# Patient Record
Sex: FEMALE | Race: BLACK OR AFRICAN AMERICAN | NOT HISPANIC OR LATINO | Employment: OTHER | URBAN - METROPOLITAN AREA
[De-identification: names, ages, dates, MRNs, and addresses within clinical notes are randomized per-mention and may not be internally consistent; named-entity substitution may affect disease eponyms.]

---

## 2017-01-11 ENCOUNTER — HOSPITAL ENCOUNTER (OUTPATIENT)
Dept: RADIOLOGY | Facility: HOSPITAL | Age: 82
Discharge: HOME | End: 2017-01-11
Attending: UROLOGY
Payer: COMMERCIAL

## 2017-01-11 DIAGNOSIS — N28.1 RENAL CYST: ICD-10-CM

## 2017-01-11 PROCEDURE — 76770 US EXAM ABDO BACK WALL COMP: CPT | Mod: TC,PO

## 2017-01-11 PROCEDURE — 76770 US EXAM ABDO BACK WALL COMP: CPT | Mod: 26,,, | Performed by: RADIOLOGY

## 2017-01-25 ENCOUNTER — OFFICE VISIT (OUTPATIENT)
Dept: UROLOGY | Facility: CLINIC | Age: 82
End: 2017-01-25
Payer: COMMERCIAL

## 2017-01-25 VITALS — SYSTOLIC BLOOD PRESSURE: 146 MMHG | DIASTOLIC BLOOD PRESSURE: 76 MMHG | BODY MASS INDEX: 21.11 KG/M2 | WEIGHT: 123 LBS

## 2017-01-25 DIAGNOSIS — N28.1 RENAL CYST: Primary | ICD-10-CM

## 2017-01-25 LAB
BILIRUB SERPL-MCNC: NORMAL MG/DL
BLOOD URINE, POC: NORMAL
COLOR, POC UA: YELLOW
GLUCOSE UR QL STRIP: NORMAL
KETONES UR QL STRIP: NORMAL
LEUKOCYTE ESTERASE URINE, POC: NORMAL
NITRITE, POC UA: NORMAL
PH, POC UA: 6
PROTEIN, POC: NORMAL
SPECIFIC GRAVITY, POC UA: 1
UROBILINOGEN, POC UA: NORMAL

## 2017-01-25 PROCEDURE — 3077F SYST BP >= 140 MM HG: CPT | Mod: S$GLB,,, | Performed by: UROLOGY

## 2017-01-25 PROCEDURE — 1159F MED LIST DOCD IN RCRD: CPT | Mod: S$GLB,,, | Performed by: UROLOGY

## 2017-01-25 PROCEDURE — 81002 URINALYSIS NONAUTO W/O SCOPE: CPT | Mod: S$GLB,,, | Performed by: UROLOGY

## 2017-01-25 PROCEDURE — 99999 PR PBB SHADOW E&M-EST. PATIENT-LVL II: CPT | Mod: PBBFAC,,, | Performed by: UROLOGY

## 2017-01-25 PROCEDURE — 3078F DIAST BP <80 MM HG: CPT | Mod: S$GLB,,, | Performed by: UROLOGY

## 2017-01-25 PROCEDURE — 1157F ADVNC CARE PLAN IN RCRD: CPT | Mod: S$GLB,,, | Performed by: UROLOGY

## 2017-01-25 PROCEDURE — 1126F AMNT PAIN NOTED NONE PRSNT: CPT | Mod: S$GLB,,, | Performed by: UROLOGY

## 2017-01-25 PROCEDURE — 1160F RVW MEDS BY RX/DR IN RCRD: CPT | Mod: S$GLB,,, | Performed by: UROLOGY

## 2017-01-25 PROCEDURE — 99214 OFFICE O/P EST MOD 30 MIN: CPT | Mod: 25,S$GLB,, | Performed by: UROLOGY

## 2017-01-25 RX ORDER — CLONIDINE HYDROCHLORIDE 0.3 MG/1
TABLET ORAL
Qty: 60 TABLET | Refills: 3 | Status: SHIPPED | OUTPATIENT
Start: 2017-01-25 | End: 2017-05-22 | Stop reason: SDUPTHER

## 2017-01-25 NOTE — PROGRESS NOTES
Chief Complaint: Bilateral renal cysts    HPI:   1/25/17: US reassuring no sig changes   1/11/16: US reassuring no sig changes.   12/29/14: US reassuring renal findings are benign cysts most likely.  CT was done for abd pain and also shows no real changes in the kidneys.  6/23/14: 81 yo woman recently evaluated for renal disease and she had a renal u/s as part of her workup. On US there is a small thinly septated cyst in the midportion of the right kidney measuring 1.7 x 1.5 x 1.4 cm and a similar septated cyst in the upper pole of the left kidney measuring 2.1 x 1.7 x 1.7 cm. No hematuria, urolithiasis, urinary bother or other significant  complaint and the cysts are incidental findings.  Occasional urgency but she is used to it.  Has fevers at night and says it was 100.3 a couple nights ago and 99.6 last night with associated sweating.  Has had fever of unknown origin with prolonged hospitalization 30 yrs ago.  Multiple UA show 1-3 RBC per HPF but never more.    Allergies:  Allopurinol analogues; Diflucan [fluconazole]; Ace inhibitors; Amlodipine; Metronidazole; and Phenytoin sodium extended    Medications: has a current medication list which includes the following prescription(s): albuterol, ammonium lactate, aspirin, vitamin b complex, calcium carbonate/vitamin d3, clonidine, diltiazem, fluticasone, fluticasone-vilanterol, hydralazine, ketoconazole, sodium bicarbonate, and urea.    Review of Systems:  General: No fever, chills, fatigability, or weight loss.  Skin: No rashes, itching, or changes in color or texture of skin.  Chest: Denies MENJIVAR, cyanosis, wheezing, cough, and sputum production.  Abdomen: Appetite fine. No weight loss. Denies diarrhea, abdominal pain, hematemesis, or blood in stool.  Musculoskeletal: No joint stiffness or swelling. Denies back pain.  : As above.  All other review of systems negative.    PMH:   has a past medical history of Anemia; Anemia of chronic renal failure (5/12/2014);  Asthma, chronic; Cataract; GERD (gastroesophageal reflux disease); Gout, arthritis; Helicobacter pylori gastritis; Hypertension; Osteoarthritis; Pulmonary HTN; and Renal insufficiency.    PSH:   has a past surgical history that includes Thyroid surgery; endometrial polyp; Cholecystectomy; and Fetal blood transfusion (12/11/2015).    FamHx: family history includes Breast cancer in her sister; Cancer in her mother and sister; Glaucoma in her sister; Liver cancer in her mother and sister; Thyroid disease in her sister. There is no history of Melanoma, Eczema, Lupus, or Psoriasis.    SocHx:  reports that she has never smoked. She has never used smokeless tobacco. She reports that she does not drink alcohol or use illicit drugs.     Physical Exam:  Vitals:   Vitals:    01/25/17 1058   BP: (!) 146/76     General: A&Ox3. No apparent distress. No deformities.  Neck: No masses. Normal thyroid.  Lungs: normal inspiration. No use of accessory muscles.  Heart: normal pulse. No arrhythmias.  Abdomen: Soft. NT. ND  Skin: The skin is warm and dry. No jaundice.  Ext: No c/c/e.  : deferred    Impression/Plan:   1. No sig changes in renal findings likely Bosniak 2 cysts.  US after 2.5 years shows no change.  Will RTC prn no further imaging.

## 2017-01-25 NOTE — MR AVS SNAPSHOT
[facility]  [address]  [place] [address]  Phone: [phone]  Fax: [phone]                  Gloria Sanz   2017 11:00 AM   Office Visit    Description:  Female : 1931   Provider:  Austin Mon IV, MD   Department:  [facility]           Reason for Visit     Other           Diagnoses this Visit        Comments    Renal cyst    -  Primary            To Do List           Future Appointments        Provider Department Dept Phone    2017 11:05 AM LABORATORY, [facility] [phone]    2017 11:20 AM Marcus Clark MD [facility] [phone]    2017 11:45 AM CHAIR 05 [facility] [phone]    3/15/2017 9:10 AM [facility] CT1 LIMIT 500 LBS [facility] [phone]    3/15/2017 9:40 AM Ivon Feliz NP [facility] [phone]      Goals (5 Years of Data)     None      Follow-Up and Disposition     Return if symptoms worsen or fail to improve.    Follow-up and Disposition History      [facility]     [facility] Care [facility] -  Assistance  Registered nurses in the [facility] provide clinical advisement, health education, appointment booking, and other advisory services.  Call for this free service at [phone].             Medications           Message regarding Medications     Verify the changes and/or additions to your medication regime listed below are the same as discussed with your clinician today.  If any of these changes or additions are incorrect, please notify your healthcare provider.             Verify that the below list of medications is an accurate representation of the medications you are currently taking.  If none reported, the list may be blank. If incorrect, please contact your healthcare provider. Carry this list with you in case of emergency.           Current Medications     albuterol 90  mcg/actuation inhaler Inhale 2 puffs into the lungs every 6 (six) hours as needed for Wheezing.    ammonium lactate (AMLACTIN) 12 % lotion Use daily.  Apply to damp skin after bathing.    aspirin (ECOTRIN) 81 MG EC tablet Take 81 mg by mouth once daily.    B complex vitamins (B COMPLEX) TbSR Take by mouth. 1 Tablet Extended Release Oral Every day    CALCIUM CARBONATE/VITAMIN D3 (CALCIUM 500 + D, D3, ORAL) Take by mouth. 1 Tablet Oral Twice a day    cloNIDine (CATAPRES) 0.3 MG tablet take 1 tablet by mouth twice a day    diltiazem (CARDIZEM CD) 180 MG 24 hr capsule Take 1 capsule (180 mg total) by mouth once daily.    fluticasone (FLONASE) 50 mcg/actuation nasal spray 2 sprays by Each Nare route daily as needed.    fluticasone-vilanterol (BREO) 100-25 mcg/dose diskus inhaler Inhale 1 puff into the lungs once daily.    hydrALAZINE (APRESOLINE) 50 MG tablet Take 1 tablet (50 mg total) by mouth 3 (three) times daily.    ketoconazole (NIZORAL) 2 % shampoo Wash hair with medicated shampoo at least 2x/week - let sit on scalp at least 5 minutes prior to rinsing    sodium bicarbonate 650 MG tablet Take 1 tablet (650 mg total) by mouth 3 (three) times daily.    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 3 nights/week           Clinical Reference Information           Vital Signs - Last Recorded  Most recent update: 1/25/2017 10:59 AM by Kerry Reagan LPN    BP Wt BMI          (!) 146/76 (BP Location: Left arm, Patient Position: Sitting, BP Method: Manual) 55.8 kg (123 lb) 21.11 kg/m2        Blood Pressure          Most Recent Value    BP  (!)  146/76      Allergies as of 1/25/2017     Allopurinol Analogues    Diflucan [Fluconazole]    Ace Inhibitors    Amlodipine    Metronidazole    Phenytoin Sodium Extended      Immunizations Administered on Date of Encounter - 1/25/2017     None      Orders Placed During Today's Visit      Normal Orders This Visit    POCT urine dipstick without microscope

## 2017-02-06 ENCOUNTER — INFUSION (OUTPATIENT)
Dept: INFUSION THERAPY | Facility: HOSPITAL | Age: 82
End: 2017-02-06
Attending: INTERNAL MEDICINE
Payer: COMMERCIAL

## 2017-02-06 ENCOUNTER — OFFICE VISIT (OUTPATIENT)
Dept: HEMATOLOGY/ONCOLOGY | Facility: CLINIC | Age: 82
End: 2017-02-06
Payer: COMMERCIAL

## 2017-02-06 ENCOUNTER — LAB VISIT (OUTPATIENT)
Dept: LAB | Facility: HOSPITAL | Age: 82
End: 2017-02-06
Attending: INTERNAL MEDICINE
Payer: COMMERCIAL

## 2017-02-06 VITALS
DIASTOLIC BLOOD PRESSURE: 68 MMHG | HEIGHT: 63 IN | BODY MASS INDEX: 22.54 KG/M2 | HEART RATE: 51 BPM | TEMPERATURE: 98 F | WEIGHT: 127.19 LBS | SYSTOLIC BLOOD PRESSURE: 132 MMHG | RESPIRATION RATE: 18 BRPM | OXYGEN SATURATION: 98 %

## 2017-02-06 DIAGNOSIS — D63.1 ANEMIA OF CHRONIC RENAL FAILURE, STAGE 3 (MODERATE): ICD-10-CM

## 2017-02-06 DIAGNOSIS — N18.30 ANEMIA OF CHRONIC RENAL FAILURE, STAGE 3 (MODERATE): Primary | ICD-10-CM

## 2017-02-06 DIAGNOSIS — N18.4 ANEMIA OF CHRONIC RENAL FAILURE, STAGE 4 (SEVERE): ICD-10-CM

## 2017-02-06 DIAGNOSIS — D63.1 ANEMIA OF CHRONIC RENAL FAILURE, STAGE 3 (MODERATE): Primary | ICD-10-CM

## 2017-02-06 DIAGNOSIS — N18.4 CKD (CHRONIC KIDNEY DISEASE) STAGE 4, GFR 15-29 ML/MIN: Primary | ICD-10-CM

## 2017-02-06 DIAGNOSIS — D63.1 ANEMIA OF CHRONIC RENAL FAILURE, STAGE 4 (SEVERE): ICD-10-CM

## 2017-02-06 DIAGNOSIS — R13.10 DYSPHAGIA, UNSPECIFIED TYPE: ICD-10-CM

## 2017-02-06 DIAGNOSIS — D56.3 ALPHA THALASSEMIA SILENT CARRIER: ICD-10-CM

## 2017-02-06 DIAGNOSIS — N18.4 CKD (CHRONIC KIDNEY DISEASE) STAGE 4, GFR 15-29 ML/MIN: ICD-10-CM

## 2017-02-06 DIAGNOSIS — N18.30 ANEMIA OF CHRONIC RENAL FAILURE, STAGE 3 (MODERATE): ICD-10-CM

## 2017-02-06 LAB
BASOPHILS # BLD AUTO: 0.01 K/UL
BASOPHILS NFR BLD: 0.2 %
DIFFERENTIAL METHOD: ABNORMAL
EOSINOPHIL # BLD AUTO: 0.1 K/UL
EOSINOPHIL NFR BLD: 1.2 %
ERYTHROCYTE [DISTWIDTH] IN BLOOD BY AUTOMATED COUNT: 15.3 %
HCT VFR BLD AUTO: 25.9 %
HGB BLD-MCNC: 8.8 G/DL
LYMPHOCYTES # BLD AUTO: 1.7 K/UL
LYMPHOCYTES NFR BLD: 33.4 %
MCH RBC QN AUTO: 26.9 PG
MCHC RBC AUTO-ENTMCNC: 34 %
MCV RBC AUTO: 79 FL
MONOCYTES # BLD AUTO: 0.5 K/UL
MONOCYTES NFR BLD: 10.4 %
NEUTROPHILS # BLD AUTO: 2.7 K/UL
NEUTROPHILS NFR BLD: 54.8 %
PLATELET # BLD AUTO: 161 K/UL
PMV BLD AUTO: 9.8 FL
RBC # BLD AUTO: 3.27 M/UL
WBC # BLD AUTO: 5 K/UL

## 2017-02-06 PROCEDURE — 85025 COMPLETE CBC W/AUTO DIFF WBC: CPT | Mod: PO

## 2017-02-06 PROCEDURE — 63600175 PHARM REV CODE 636 W HCPCS: Mod: PO | Performed by: INTERNAL MEDICINE

## 2017-02-06 PROCEDURE — 99999 PR PBB SHADOW E&M-EST. PATIENT-LVL IV: CPT | Mod: PBBFAC,,, | Performed by: INTERNAL MEDICINE

## 2017-02-06 PROCEDURE — 36415 COLL VENOUS BLD VENIPUNCTURE: CPT | Mod: PO

## 2017-02-06 PROCEDURE — 1159F MED LIST DOCD IN RCRD: CPT | Mod: S$GLB,,, | Performed by: INTERNAL MEDICINE

## 2017-02-06 PROCEDURE — 96372 THER/PROPH/DIAG INJ SC/IM: CPT | Mod: PO

## 2017-02-06 PROCEDURE — 1160F RVW MEDS BY RX/DR IN RCRD: CPT | Mod: S$GLB,,, | Performed by: INTERNAL MEDICINE

## 2017-02-06 PROCEDURE — 3075F SYST BP GE 130 - 139MM HG: CPT | Mod: S$GLB,,, | Performed by: INTERNAL MEDICINE

## 2017-02-06 PROCEDURE — 1126F AMNT PAIN NOTED NONE PRSNT: CPT | Mod: S$GLB,,, | Performed by: INTERNAL MEDICINE

## 2017-02-06 PROCEDURE — 99214 OFFICE O/P EST MOD 30 MIN: CPT | Mod: 25,S$GLB,, | Performed by: INTERNAL MEDICINE

## 2017-02-06 PROCEDURE — 3078F DIAST BP <80 MM HG: CPT | Mod: S$GLB,,, | Performed by: INTERNAL MEDICINE

## 2017-02-06 PROCEDURE — 1157F ADVNC CARE PLAN IN RCRD: CPT | Mod: S$GLB,,, | Performed by: INTERNAL MEDICINE

## 2017-02-06 RX ADMIN — ERYTHROPOIETIN 20000 UNITS: 20000 INJECTION, SOLUTION INTRAVENOUS; SUBCUTANEOUS at 12:02

## 2017-02-06 NOTE — MR AVS SNAPSHOT
Patient Information     Patient Name Sex Gloria Bruce Female 1931      Visit Information        Provider Department Dept Phone Center    2017 11:45 AM [facility] Chemo Infusion [facility]otherapy [facility]fusion [phone] [facility]      Patient Instructions    .  [facility]  [address] [facility] [address] [address]  [address]  [phone] phone     [phone] fax  Hours of Operation: Monday- Friday 8:00am- 5:00pm  After hours phone  [phone]  Hematology / Oncology Physicians on call      Dr. Fernando Cristina    Please call with any concerns regarding your appointment today.         Your Current Medications Are     albuterol 90 mcg/actuation inhaler    ammonium lactate (AMLACTIN) 12 % lotion    aspirin (ECOTRIN) 81 MG EC tablet    B complex vitamins (B COMPLEX) TbSR    CALCIUM CARBONATE/VITAMIN D3 (CALCIUM 500 + D, D3, ORAL)    cloNIDine (CATAPRES) 0.3 MG tablet    diltiazem (CARDIZEM CD) 180 MG 24 hr capsule    fluticasone (FLONASE) 50 mcg/actuation nasal spray    fluticasone-vilanterol (BREO) 100-25 mcg/dose diskus inhaler    hydrALAZINE (APRESOLINE) 50 MG tablet    ketoconazole (NIZORAL) 2 % shampoo    sodium bicarbonate 650 MG tablet    urea (CARMOL) 40 % Crea      Facility-Administered Medications     epoetin traci injection 20,000 Units      Appointments for Next Year     2017 11:00 AM CONSULT (30 min.) [facility] Vladimir Antoine III, MD    Arrive at check-in approximately 15 minutes before your scheduled appointment time. Bring all outside medical records and imaging, along with a list of your current medications and insurance card.    (off [facility]) 2nd floor    2017 10:00 AM INFUSION 015 MIN (15 min.) [facility] CHAIR [address] [facility]    Arrive at check-in approximately 15 minutes before your scheduled appointment time. Bring all outside medical records and imaging, along with a list of your current  "medications and insurance card.    3/6/2017 10:55 AM NON FASTING LAB (5 min.) [facility] LABORATORY, [facility]    Arrive at check-in approximately 15 minutes before your scheduled appointment time. Bring all outside medical records and imaging, along with a list of your current medications and insurance card.    (off [address]) 2nd floor    3/6/2017 11:20 AM ESTABLISHED PATIENT (20 min.) [facility] Hemotology Oncology Duthc Napoles Jr., MD    Arrive at check-in approximately 15 minutes before your scheduled appointment time. Bring all outside medical records and imaging, along with a list of your current medications and insurance card.    (off [address]) 3rd Floor    3/6/2017 11:45 AM INFUSION 015 MIN (15 min.) [facility] CHAIR 10 [facility]    Arrive at check-in approximately 15 minutes before your scheduled appointment time. Bring all outside medical records and imaging, along with a list of your current medications and insurance card.    3/15/2017  9:10 AM CT CHEST WO CONTRAST (20 min.) [facility] CT1 LIMIT 500 LBS    Arrive at check-in approximately 30 minutes before your scheduled appointment time. Bring all outside medical records and imaging, along with a list of your current medications and insurance card.    (off [address]) 2nd Floor    3/15/2017  9:40 AM ESTABLISHED PATIENT (20 min.) [facility] Pulmonary Services Ivon Feliz NP    Arrive at check-in approximately 15 minutes before your scheduled appointment time. Bring all outside medical records and imaging, along with a list of your current medications and insurance card.    (off [address]) 3th floor         Default Flowsheet Data (last 24 hours)      Amb Complex Vitals Cheo        02/06/17 1159 02/06/17 1152             Measurements    Weight  57.7 kg (127 lb 3.3 oz)       Height  5' 2.6" (1.59 m)       BSA (Calculated - sq m)  1.6 sq meters       BMI (Calculated)  22.9    "    BP  132/68       Temp  97.6 °F (36.4 °C)       Pulse  (!)  51       Resp  18       SpO2  98 %       Pain Assessment    Pain Score Zero Zero               Allergies     Allopurinol Analogues Rash, Other (See Comments)    Sores in mouth    Diflucan [Fluconazole] Swelling    Ace Inhibitors Nausea And Vomiting    Amlodipine Edema    Metronidazole Nausea And Vomiting    Phenytoin Sodium Extended Nausea And Vomiting      Medications You Received from 02/05/2017 1209 to 02/06/2017 1209        Date/Time Order Dose Route Action     02/06/2017 1208 epoetin traci injection 20,000 Units 20,000 Units Subcutaneous Given      Current Discharge Medication List     Cannot display discharge medications since this is not an admission.

## 2017-02-06 NOTE — PATIENT INSTRUCTIONS
.  [facility]  [address]  [address]  [phone] phone     [phone] fax  Hours of Operation: Monday- Friday 8:00am- 5:00pm  After hours phone  [phone]  Hematology / Oncology Physicians on call      Dr. Fernando Cristina    Please call with any concerns regarding your appointment today.

## 2017-02-06 NOTE — PROGRESS NOTES
Subjective:       Patient ID: Gloria Sanz is a 85 y.o. female.    Chief Complaint: Follow-up    HPI   This 84 year old AA lady comes for follow uo her anemia associated to chronic renal failure.  She is known to us because in MAy 2010 had a cbc that showed a hemoglobin of 10.6 with an MCV of 79.4. White cell count was 4,380 (differential included 17% monocytes) and a platelet count of 217,000 Review of information in the computer indicated that the patient has had mild anemia dating back a number of years\.    In 1991, her hemoglobin was 11.7 with an MCV of 84. Her MCV through the years has been anywhere from 79-84. The patient has had several iron studies that showed an elevated ferritin, a creatinine of 1.7 as well as serum protein electrophoresis that has failed to show a monoclonal spike.    . B12 levels have been normal.    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    The patient comes today for routine follow up.    At the end dec 2011 she had a severe epistaxis requiring a blood transfusion. She ended up having surgery for nasal polyps In May 2012 she ha had a drop of hr HGB to 8.8 and her creatinine was 1.8 She slowly improved ion her own. SPEp showed a polyclonal gammopathy and her free light chains showed elevations of both the kappa and lambda light chains which goes against clonality.       She had the zoster vaccine in 2008    She is on procrit every 2 weeks when needed    She is know to have a sub-centimeter pulmonary nodule in the right middle lobe, Ct scan in December 2015 showed stability. A multinodular thyroid was found and an US was suggested.The Us showed the same.        She has recurrent epistaxis which is felt to be due to HBP  She comes for follow up after having been receiving Procrit for several weeks     She says that since the last visit  she ahs noticed problems swallowing. She feels her food gets stuck at the base of the enck. She describes a particularly severe event a few days osorio      ALLERGIES: See med card  MEDICATIONS: See MedCard.  PREVIOUS SURGERIES: Thyroid nodule removed many years ago and endometrial  polyp removed in 2009.  Nasal polyps surgery around april 2012  SOCIAL HISTORY: She is  with three children. She lives in [place].  She does not smoke or drink. 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  FAMILY HISTORY: Two sisters have liver cancer, and another one has breast  cancer. Mother had cancer of the gallbladder. Father had a heart attack.  No diabetes in the family.  PAST MEDICAL HISTORY:  1. 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  2. Alpha globin gene rearrangement test showed that she is an alpha  thalassemia carrier.  3. Chronic anemia.  4-elevated creatinine  5. Hypertension.  6. Status post removal of endometrial polyp.  7. Epistaxis-recurrent felt to be due to HBP.  8. hx of epistaxis Dec 2011 ( nasal polyps)           Review of Systems   Constitutional: Negative.    HENT: Negative.    Eyes: Negative.    Respiratory: Negative.  Negative for cough and wheezing.    Cardiovascular: Negative.  Negative for chest pain.   Gastrointestinal: Negative.         Dysphagia   Genitourinary: Negative.    Neurological: Negative.    Psychiatric/Behavioral: Negative.        Objective:      Physical Exam   Constitutional: She is oriented to person, place, and time. She appears well-developed. No distress.   HENT:   Head: Normocephalic.   Right Ear: Tympanic membrane, external ear and ear canal normal.   Left Ear: Tympanic membrane, external ear and ear canal normal.   Nose: Nose normal. Right sinus exhibits no maxillary sinus tenderness and no frontal sinus tenderness. Left sinus exhibits no maxillary sinus tenderness and no frontal sinus tenderness.   Mouth/Throat:  Oropharynx is clear and moist and mucous membranes are normal.   Teeth normal.  Gums normal.   Eyes: Conjunctivae and lids are normal. Pupils are equal, round, and reactive to light.   Neck: Normal carotid pulses, no hepatojugular reflux and no JVD present. Carotid bruit is not present. No tracheal deviation present. No thyroid mass and no thyromegaly present.   Cardiovascular: Normal rate, regular rhythm, S1 normal, S2 normal, normal heart sounds and intact distal pulses.  Exam reveals no gallop and no friction rub.    No murmur heard.  1-2/6 tarah at the base   Pulmonary/Chest: Effort normal and breath sounds normal. No accessory muscle usage. No respiratory distress. She has no wheezes. She has no rales. She exhibits no tenderness.   Abdominal: Soft. Normal appearance. She exhibits no distension and no mass. There is no splenomegaly or hepatomegaly. There is no tenderness. There is no rebound and no guarding.   Musculoskeletal: Normal range of motion. She exhibits no edema or tenderness.        Right hand: Normal.        Left hand: Normal.       Lymphadenopathy:     She has no cervical adenopathy.     She has no axillary adenopathy.        Right: No inguinal and no supraclavicular adenopathy present.        Left: No inguinal and no supraclavicular adenopathy present.   Neurological: She is alert and oriented to person, place, and time. She has normal strength. No cranial nerve deficit. Coordination normal.   Skin: Skin is warm and dry. No rash noted. She is not diaphoretic. No cyanosis or erythema. No pallor. Nails show no clubbing.   Psychiatric: She has a normal mood and affect. Her behavior is normal. Judgment and thought content normal.       Wt Readings from Last 3 Encounters:   02/06/17 57.7 kg (127 lb 3.3 oz)   01/25/17 55.8 kg (123 lb)   12/20/16 55.9 kg (123 lb 3.8 oz)     Temp Readings from Last 3 Encounters:   02/06/17 97.6 °F (36.4 °C) (Oral)   12/20/16 96.1 °F (35.6 °C) (Oral)   12/06/16 96.9 °F (36.1  °C)     BP Readings from Last 3 Encounters:   02/06/17 132/68   01/25/17 (!) 146/76   12/20/16 124/76     Pulse Readings from Last 3 Encounters:   02/06/17 (!) 51   12/20/16 93   12/15/16 84   \    Assessment:       1. CKD (chronic kidney disease) stage 4, GFR 15-29 ml/min    2. Anemia of chronic renal failure, stage 4 (severe)    3. Alpha thalassemia silent carrier    4. Dysphagia, unspecified type        Plan:       Lab Results   Component Value Date    WBC 5.00 02/06/2017    HGB 8.8 (L) 02/06/2017    HCT 25.9 (L) 02/06/2017    MCV 79 (L) 02/06/2017     02/06/2017     Lab Results   Component Value Date    CREATININE 3.9 (H) 12/20/2016    CREATININE 3.9 (H) 12/20/2016     She will restart Procirt. See me in 4 weeks with a cbc and a nurses appointment.  GI referral for evaluation of dysphagia

## 2017-02-14 ENCOUNTER — INITIAL CONSULT (OUTPATIENT)
Dept: GASTROENTEROLOGY | Facility: CLINIC | Age: 82
End: 2017-02-14
Payer: COMMERCIAL

## 2017-02-14 VITALS
DIASTOLIC BLOOD PRESSURE: 68 MMHG | HEART RATE: 58 BPM | BODY MASS INDEX: 22.66 KG/M2 | HEIGHT: 63 IN | WEIGHT: 127.88 LBS | SYSTOLIC BLOOD PRESSURE: 132 MMHG

## 2017-02-14 DIAGNOSIS — R13.14 PHARYNGOESOPHAGEAL DYSPHAGIA: Primary | ICD-10-CM

## 2017-02-14 DIAGNOSIS — K57.30 DIVERTICULOSIS OF LARGE INTESTINE WITHOUT HEMORRHAGE: ICD-10-CM

## 2017-02-14 DIAGNOSIS — R60.0 PERIPHERAL EDEMA: ICD-10-CM

## 2017-02-14 DIAGNOSIS — I10 ESSENTIAL HYPERTENSION: ICD-10-CM

## 2017-02-14 DIAGNOSIS — J45.40 MODERATE PERSISTENT ASTHMA WITHOUT COMPLICATION: ICD-10-CM

## 2017-02-14 DIAGNOSIS — N18.4 KIDNEY DISEASE, CHRONIC, STAGE IV (GFR 15-29 ML/MIN): ICD-10-CM

## 2017-02-14 PROCEDURE — 1157F ADVNC CARE PLAN IN RCRD: CPT | Mod: S$GLB,,, | Performed by: INTERNAL MEDICINE

## 2017-02-14 PROCEDURE — 1160F RVW MEDS BY RX/DR IN RCRD: CPT | Mod: S$GLB,,, | Performed by: INTERNAL MEDICINE

## 2017-02-14 PROCEDURE — 99999 PR PBB SHADOW E&M-EST. PATIENT-LVL III: CPT | Mod: PBBFAC,,, | Performed by: INTERNAL MEDICINE

## 2017-02-14 PROCEDURE — 99203 OFFICE O/P NEW LOW 30 MIN: CPT | Mod: S$GLB,,, | Performed by: INTERNAL MEDICINE

## 2017-02-14 PROCEDURE — 3075F SYST BP GE 130 - 139MM HG: CPT | Mod: S$GLB,,, | Performed by: INTERNAL MEDICINE

## 2017-02-14 PROCEDURE — 1126F AMNT PAIN NOTED NONE PRSNT: CPT | Mod: S$GLB,,, | Performed by: INTERNAL MEDICINE

## 2017-02-14 PROCEDURE — 1159F MED LIST DOCD IN RCRD: CPT | Mod: S$GLB,,, | Performed by: INTERNAL MEDICINE

## 2017-02-14 PROCEDURE — 3078F DIAST BP <80 MM HG: CPT | Mod: S$GLB,,, | Performed by: INTERNAL MEDICINE

## 2017-02-14 NOTE — PROGRESS NOTES
Subjective:       Patient ID: Gloria Sanz is a 85 y.o. female.    Chief Complaint: Dysphagia    HPI Comments: The patient presents with complaint of food sticking in her throat. This has been going on for ~1 month. She has no problem with liquids. She has no problems with ice creams or puddings. She has no problem with mashed potatoes. There is no clear with rice or peas or corn. She has no problems with fish, but she does seem to have trouble with white meat chicken and sausage. Also trouble with Steak. The latter is easier with water which she didn't have to do before. The problem is not present all the time but seems to be a bit worse as of late.    She denies painful swallowing. She does have some problem with dry eyes and dry mouth. She also has a history of thick secretions in the back of her throat every since she had problems with her sinuses leading to surgery over 5 years ago.     Review of Systems   Constitutional: Positive for unexpected weight change. Negative for activity change, appetite change, chills, diaphoresis, fatigue and fever.   HENT: Positive for voice change. Negative for congestion, ear discharge, ear pain, hearing loss, nosebleeds, postnasal drip and tinnitus.    Eyes: Negative for photophobia and visual disturbance.   Respiratory: Negative for apnea, cough, choking, chest tightness, shortness of breath and wheezing.    Cardiovascular: Negative for chest pain, palpitations and leg swelling.   Gastrointestinal: Negative for abdominal distention, abdominal pain, anal bleeding, blood in stool, constipation, diarrhea, nausea, rectal pain and vomiting.   Genitourinary: Negative for difficulty urinating, dyspareunia, dysuria, flank pain, frequency, hematuria, menstrual problem, pelvic pain, urgency, vaginal bleeding and vaginal discharge.   Musculoskeletal: Negative for arthralgias, back pain, gait problem, joint swelling, myalgias and neck stiffness.        Ankle swelling   Skin: Negative  for pallor and rash.   Neurological: Negative for dizziness, tremors, seizures, syncope, speech difficulty, weakness, numbness and headaches.   Hematological: Negative for adenopathy.   Psychiatric/Behavioral: Negative for agitation, confusion, hallucinations, sleep disturbance and suicidal ideas.       Objective:      Physical Exam   Constitutional: She is oriented to person, place, and time. She appears well-developed and well-nourished.   HENT:   Head: Normocephalic and atraumatic.   Bilateral turbinate congestion   Eyes: Conjunctivae and EOM are normal. Pupils are equal, round, and reactive to light. Right eye exhibits no discharge. Left eye exhibits no discharge. No scleral icterus.   Neck: Normal range of motion. Neck supple. No JVD present. No thyromegaly present.   Cardiovascular: Normal rate, regular rhythm, normal heart sounds and intact distal pulses.  Exam reveals no gallop and no friction rub.    No murmur heard.  Pulmonary/Chest: Effort normal and breath sounds normal. No respiratory distress. She has no wheezes. She has no rales. She exhibits no tenderness.   Abdominal: Soft. Bowel sounds are normal. She exhibits no distension and no mass. There is no tenderness. There is no rebound and no guarding.   Musculoskeletal: Normal range of motion. She exhibits edema.   2+ pedal and pretibial   Lymphadenopathy:     She has no cervical adenopathy.   Neurological: She is alert and oriented to person, place, and time. She has normal reflexes. She exhibits normal muscle tone. Coordination normal.   Skin: Skin is warm and dry. No rash noted. No erythema. No pallor.   Psychiatric: She has a normal mood and affect. Her behavior is normal. Judgment and thought content normal.   Vitals reviewed.      Assessment:    Pharyngeo-esophageal Dysphagia    Diverticulosis   Kidney Disease   HTN   Peripheral edema   Asthma  No diagnosis found.    Plan:       Esophagram and modified Barium Swallow. Consider EGD depending on  imaging results.

## 2017-02-14 NOTE — MR AVS SNAPSHOT
[facility]  [address]  [place] [address]  Phone: [phone]  Fax: [phone]                  Gloria Sanz   2017 11:00 AM   Initial consult    Description:  Female : 1931   Provider:  Vladimir Antoine III, MD   Department:  [facility]           Reason for Visit     Dysphagia           Diagnoses this Visit        Comments    Pharyngoesophageal dysphagia    -  Primary            To Do List           Future Appointments        Provider Department Dept Phone    2017 10:00 AM CHAIR 07 [facility] [phone]    3/6/2017 10:55 AM LABORATORY, [facility] [phone]    3/6/2017 11:20 AM Dutch Napoles Jr., MD [facility] Hemotology Oncology [phone]    3/6/2017 11:45 AM CHAIR 10 [facility] [phone]    3/15/2017 9:10 AM [facility] CT1 LIMIT 500 LBS [facility] [phone]      Goals (5 Years of Data)     None      [facility]     [facility] Nurse Care [facility] -  Assistance  Registered nurses in the [facility] provide clinical advisement, health education, appointment booking, and other advisory services.  Call for this free service at [phone].             Medications           Message regarding Medications     Verify the changes and/or additions to your medication regime listed below are the same as discussed with your clinician today.  If any of these changes or additions are incorrect, please notify your healthcare provider.             Verify that the below list of medications is an accurate representation of the medications you are currently taking.  If none reported, the list may be blank. If incorrect, please contact your healthcare provider. Carry this list with you in case of emergency.           Current Medications     albuterol 90 mcg/actuation inhaler Inhale 2 puffs into the lungs every 6 (six) hours as  "needed for Wheezing.    ammonium lactate (AMLACTIN) 12 % lotion Use daily.  Apply to damp skin after bathing.    aspirin (ECOTRIN) 81 MG EC tablet Take 81 mg by mouth once daily.    B complex vitamins (B COMPLEX) TbSR Take by mouth. 1 Tablet Extended Release Oral Every day    CALCIUM CARBONATE/VITAMIN D3 (CALCIUM 500 + D, D3, ORAL) Take by mouth. 1 Tablet Oral Twice a day    cloNIDine (CATAPRES) 0.3 MG tablet take 1 tablet by mouth twice a day    diltiazem (CARDIZEM CD) 180 MG 24 hr capsule Take 1 capsule (180 mg total) by mouth once daily.    fluticasone (FLONASE) 50 mcg/actuation nasal spray 2 sprays by Each Nare route daily as needed.    fluticasone-vilanterol (BREO) 100-25 mcg/dose diskus inhaler Inhale 1 puff into the lungs once daily.    hydrALAZINE (APRESOLINE) 50 MG tablet Take 1 tablet (50 mg total) by mouth 3 (three) times daily.    ketoconazole (NIZORAL) 2 % shampoo Wash hair with medicated shampoo at least 2x/week - let sit on scalp at least 5 minutes prior to rinsing    sodium bicarbonate 650 MG tablet Take 1 tablet (650 mg total) by mouth 3 (three) times daily.    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 3 nights/week           Clinical Reference Information           Your Vitals Were     BP Pulse Height Weight BMI    132/68 58 5' 2.6" (1.59 m) 58 kg (127 lb 13.9 oz) 22.94 kg/m2      Blood Pressure          Most Recent Value    BP  132/68      Allergies as of 2/14/2017     Allopurinol Analogues    Diflucan [Fluconazole]    Ace Inhibitors    Amlodipine    Metronidazole    Phenytoin Sodium Extended      Immunizations Administered on Date of Encounter - 2/14/2017     None      Orders Placed During Today's Visit     Future Labs/Procedures Expected by Expires    FL Esophagram Complete  2/14/2017 2/14/2018    Fl Modified Barium Swallow Speech  2/14/2017 2/14/2018      Language Assistance Services     ATTENTION: Language assistance services are available, free of charge. Please call [phone].      ATENCIÓN: " Si habla español, tiene a sargent disposición servicios gratuitos de asistencia lingüística. Llame al [phone].     CHÚ Ý: N?u b?n nói Ti?ng Vi?t, có các d?ch v? h? tr? ngôn ng? mi?n phí dành cho b?n. G?i s? [phone].         [facility] complies with applicable Federal civil rights laws and does not discriminate on the basis of race, color, national origin, age, disability, or sex.

## 2017-02-20 ENCOUNTER — INFUSION (OUTPATIENT)
Dept: INFUSION THERAPY | Facility: HOSPITAL | Age: 82
End: 2017-02-20
Attending: INTERNAL MEDICINE
Payer: COMMERCIAL

## 2017-02-20 VITALS
DIASTOLIC BLOOD PRESSURE: 75 MMHG | RESPIRATION RATE: 18 BRPM | HEART RATE: 65 BPM | TEMPERATURE: 98 F | SYSTOLIC BLOOD PRESSURE: 125 MMHG

## 2017-02-20 DIAGNOSIS — N18.30 ANEMIA OF CHRONIC RENAL FAILURE, STAGE 3 (MODERATE): Primary | ICD-10-CM

## 2017-02-20 DIAGNOSIS — D63.1 ANEMIA OF CHRONIC RENAL FAILURE, STAGE 3 (MODERATE): Primary | ICD-10-CM

## 2017-02-20 PROCEDURE — 63600175 PHARM REV CODE 636 W HCPCS: Mod: PO | Performed by: INTERNAL MEDICINE

## 2017-02-20 PROCEDURE — 96372 THER/PROPH/DIAG INJ SC/IM: CPT | Mod: PO

## 2017-02-20 RX ADMIN — ERYTHROPOIETIN 20000 UNITS: 20000 INJECTION, SOLUTION INTRAVENOUS; SUBCUTANEOUS at 09:02

## 2017-02-20 NOTE — MR AVS SNAPSHOT
Patient Information     Patient Name Sex Gloria Bruce Female 1931      Visit Information        Provider Department Dept Phone Center    2017 10:00 AM [facility] Chemo Infusion [facility] Chemotherapy Infusion [phone] [facility]      Patient Instructions      [facility]  [address] [facility] [address]  [address] [address]  [phone] phone     [phone] fax  Hours of Operation: Monday- Friday 8:00am- 5:00pm  After hours phone  [phone]  Hematology / Oncology Physicians on call      Dr. Fernando Cristina    Please call with any concerns regarding your appointment today.       Your Current Medications Are     albuterol 90 mcg/actuation inhaler    ammonium lactate (AMLACTIN) 12 % lotion    aspirin (ECOTRIN) 81 MG EC tablet    B complex vitamins (B COMPLEX) TbSR    CALCIUM CARBONATE/VITAMIN D3 (CALCIUM 500 + D, D3, ORAL)    cloNIDine (CATAPRES) 0.3 MG tablet    diltiazem (CARDIZEM CD) 180 MG 24 hr capsule    fluticasone (FLONASE) 50 mcg/actuation nasal spray    fluticasone-vilanterol (BREO) 100-25 mcg/dose diskus inhaler    hydrALAZINE (APRESOLINE) 50 MG tablet    ketoconazole (NIZORAL) 2 % shampoo    sodium bicarbonate 650 MG tablet    urea (CARMOL) 40 % Crea      Facility-Administered Medications     epoetin traci injection 20,000 Units      Appointments for Next Year     2017 10:00 AM INFUSION 015 MIN (15 min.) [facility] CHAIR 07 [facility]    Arrive at check-in approximately 15 minutes before your scheduled appointment time. Bring all outside medical records and imaging, along with a list of your current medications and insurance card.    2017 10:30 AM FL MOD BA SWALLOW (30 min.) [facility] [id number]    Arrive at check-in approximately 15 minutes before your scheduled appointment time. Bring all outside medical records and imaging, along with a list of your current medications and insurance  card.    2/21/2017 11:30 AM FL ESOPHAGRAM (30 min.) [facility] [id number]    Please do not eat or drink anything after midnight (8 hours before appt time for an afternoon appointment). Oral medication with a small amount of water the morning before test is acceptable. Arrive at check-in approximately 30 minutes before your scheduled appointment time. Bring all outside medical records and imaging, along with a list of your current medications and insurance card.    3/6/2017 10:55 AM NON FASTING LAB (5 min.) [facility] LABORATORY [facility]    Arrive at check-in approximately 15 minutes before your scheduled appointment time. Bring all outside medical records and imaging, along with a list of your current medications and insurance card.    (off [employer]) 2nd floor    3/6/2017 11:20 AM ESTABLISHED PATIENT (20 min.) [facility] Hemotology Oncology Dutch Napoles Jr., MD    Arrive at check-in approximately 15 minutes before your scheduled appointment time. Bring all outside medical records and imaging, along with a list of your current medications and insurance card.    (off [employer]) 3rd Floor    3/6/2017 11:45 AM INFUSION 015 MIN (15 min.) [facility] CHAIR 10 [facility]    Arrive at check-in approximately 15 minutes before your scheduled appointment time. Bring all outside medical records and imaging, along with a list of your current medications and insurance card.    3/15/2017  9:10 AM CT CHEST WO CONTRAST (20 min.) [facility] CT1 LIMIT 500 LBS    Arrive at check-in approximately 30 minutes before your scheduled appointment time. Bring all outside medical records and imaging, along with a list of your current medications and insurance card.    (off [employer]) 2nd Floor    3/15/2017  9:40 AM ESTABLISHED PATIENT (20 min.) [facility] Pulmonary Services Ivon Feliz NP    Arrive at check-in approximately 15 minutes before your  scheduled appointment time. Bring all outside medical records and imaging, along with a list of your current medications and insurance card.    [facility] 3th floor         Default Flowsheet Data (last 24 hours)      Amb Complex Vitals Cheo        02/20/17 0935 02/20/17 0931             Measurements    /75        Temp 97.6 °F (36.4 °C)        Pulse 65        Resp 18        Pain Assessment    Pain Score  Zero               Allergies     Allopurinol Analogues Rash, Other (See Comments)    Sores in mouth    Diflucan [Fluconazole] Swelling    Ace Inhibitors Nausea And Vomiting    Amlodipine Edema    Metronidazole Nausea And Vomiting    Phenytoin Sodium Extended Nausea And Vomiting      Medications You Received from 02/19/2017 0938 to 02/20/2017 0938        Date/Time Order Dose Route Action     02/20/2017 0936 epoetin traci injection 20,000 Units 20,000 Units Subcutaneous Given      Current Discharge Medication List     Cannot display discharge medications since this is not an admission.

## 2017-02-20 NOTE — PATIENT INSTRUCTIONS
[facility]  [address] [facility] [address]  [address]  [phone] phone     [phone] fax  Hours of Operation: Monday- Friday 8:00am- 5:00pm  After hours phone  [phone]  Hematology / Oncology Physicians on call      Dr. Fernando Cristina    Please call with any concerns regarding your appointment today.

## 2017-02-21 ENCOUNTER — HOSPITAL ENCOUNTER (OUTPATIENT)
Dept: RADIOLOGY | Facility: HOSPITAL | Age: 82
Discharge: HOME | End: 2017-02-21
Attending: INTERNAL MEDICINE
Payer: COMMERCIAL

## 2017-02-21 DIAGNOSIS — R13.14 PHARYNGOESOPHAGEAL DYSPHAGIA: ICD-10-CM

## 2017-02-21 PROCEDURE — 74220 X-RAY XM ESOPHAGUS 1CNTRST: CPT | Mod: TC

## 2017-02-21 PROCEDURE — G8997 SWALLOW GOAL STATUS: HCPCS | Mod: CI

## 2017-02-21 PROCEDURE — G8998 SWALLOW D/C STATUS: HCPCS | Mod: CI

## 2017-02-21 PROCEDURE — G8996 SWALLOW CURRENT STATUS: HCPCS | Mod: CI

## 2017-02-21 PROCEDURE — 92611 MOTION FLUOROSCOPY/SWALLOW: CPT

## 2017-02-21 PROCEDURE — 74230 X-RAY XM SWLNG FUNCJ C+: CPT | Mod: TC

## 2017-02-21 NOTE — PROCEDURES
Modifed Barium Swallow Study  Speech Start Time: 1000  Speech Stop Time: 1020  Speech Total (min): 20 min         Reason for Referral  Patient was referred for a Modified Barium Swallow Study to assess the efficiency of his/her swallow function, rule out aspiration and make recommendations regarding safe dietary consistencies, effective compensatory strategies, and safe eating environment.     Diagnosis   The encounter diagnosis was Pharyngoesophageal dysphagia.    Past Medical History   Diagnosis Date    Anemia      sickle cell trait, alpha thalessemia    Anemia of chronic renal failure 5/12/2014    Asthma, chronic     Cataract     GERD (gastroesophageal reflux disease)     Gout, arthritis     Helicobacter pylori gastritis     Hypertension     Osteoarthritis     Pulmonary HTN     Renal insufficiency      Past Surgical History   Procedure Laterality Date    Thyroid surgery      Endometrial polyp      Cholecystectomy      Fetal blood transfusion  12/11/2015     2 pints      Recommendations  ST recommends to continue current diet of soft mechanical with thin liquids and the following swallow strategies:  - sit upright during and 45 minutes following intake  - alternate bites and sips  - avoid spicy, acidic foods  - stop po intake at least 2 hours prior to bedtime    Oral Peripheral Examination   - unremarkable    Consistencies Assessed  Thin liquids, puree, and solids with barium were viewed fluoroscopically.      Oral Preparation / Oral Phase  - unremarkable    Pharyngeal Phase  - unremarkable  - no aspiration or penetration  - adequate laryngeal elevation and pharyngeal squeezing leaving no residue    Cervical Esophageal Phase  - probable cervical osteophyte at level C5-C6 and C6-C7 which does not impede the flow of the bolus but may be contributing to globus sensation  - pt completed an esophagram with liquids only prior to this study  - esophageal screening with solids revealed no strictures, no  diverticulum, and decreased clearance requiring a liquid wash to clear    Impressions  Pt presents with esophageal dysphagia characterized by probable cervical osteophytes with a globus sensation and decreased esophageal bolus propulsion.  No aspiration or penetration was visualized and pt was safe for intake of all consistencies.      Prognosis/Plan/Education  Pt was educated on results and recommendations.  No further ST warranted at this time.          G-Codes  Functional Assessment Tool Used: JOEL FCM  Score: level 6  Functional Limitations: Swallowing  Swallow Current Status (): CI  Swallow Goal Status (): CI  Swallow Discharge Status (): CI

## 2017-02-28 ENCOUNTER — OFFICE VISIT (OUTPATIENT)
Dept: GASTROENTEROLOGY | Facility: CLINIC | Age: 82
End: 2017-02-28
Payer: COMMERCIAL

## 2017-02-28 VITALS
HEIGHT: 63 IN | SYSTOLIC BLOOD PRESSURE: 130 MMHG | BODY MASS INDEX: 22.5 KG/M2 | WEIGHT: 127 LBS | DIASTOLIC BLOOD PRESSURE: 70 MMHG | HEART RATE: 60 BPM

## 2017-02-28 DIAGNOSIS — R13.14 PHARYNGOESOPHAGEAL DYSPHAGIA: Primary | ICD-10-CM

## 2017-02-28 PROCEDURE — 3075F SYST BP GE 130 - 139MM HG: CPT | Mod: S$GLB,,, | Performed by: INTERNAL MEDICINE

## 2017-02-28 PROCEDURE — 1160F RVW MEDS BY RX/DR IN RCRD: CPT | Mod: S$GLB,,, | Performed by: INTERNAL MEDICINE

## 2017-02-28 PROCEDURE — 99212 OFFICE O/P EST SF 10 MIN: CPT | Mod: S$GLB,,, | Performed by: INTERNAL MEDICINE

## 2017-02-28 PROCEDURE — 1126F AMNT PAIN NOTED NONE PRSNT: CPT | Mod: S$GLB,,, | Performed by: INTERNAL MEDICINE

## 2017-02-28 PROCEDURE — 1159F MED LIST DOCD IN RCRD: CPT | Mod: S$GLB,,, | Performed by: INTERNAL MEDICINE

## 2017-02-28 PROCEDURE — 99999 PR PBB SHADOW E&M-EST. PATIENT-LVL II: CPT | Mod: PBBFAC,,, | Performed by: INTERNAL MEDICINE

## 2017-02-28 PROCEDURE — 1157F ADVNC CARE PLAN IN RCRD: CPT | Mod: S$GLB,,, | Performed by: INTERNAL MEDICINE

## 2017-02-28 PROCEDURE — 3078F DIAST BP <80 MM HG: CPT | Mod: S$GLB,,, | Performed by: INTERNAL MEDICINE

## 2017-03-01 NOTE — PROGRESS NOTES
Subjective:       Patient ID: Gloria Sanz is a 85 y.o. female.    Chief Complaint: Follow-up (results)    HPI Comments: The patient was seen 2 weeks ago with complaint of dysphagia.  The details of that visit are available in the record from February 14 and reviewed for the visit today.  The patient was scheduled for an esophagram and a modified barium swallow.  Results of the studies were reviewed with the patient and her daughter.    The esophagram was read as totally normal.    This patient therapist found no swallowing difficulties, but did note evidence of pressure on the esophagus by osteophytes from the cervical spine.  These did not appear to impede the passage of any of the foods tested.  This could, however, conceivably have some impact on passage of larger and bulkier food items.  Speech therapist's recommendations were reviewed with the patient and she was told to chop up her meats well and take her time showing before swallowing.  Sips of water between swallows was also recommended as was one of the considerations from the speech therapist.    Review of Systems   Constitutional: Negative for activity change, appetite change, chills, diaphoresis, fatigue, fever and unexpected weight change.   HENT: Positive for voice change. Negative for congestion, ear discharge, ear pain, hearing loss, nosebleeds, postnasal drip and tinnitus.    Eyes: Negative for photophobia and visual disturbance.   Respiratory: Positive for cough and wheezing. Negative for apnea, choking, chest tightness and shortness of breath.    Cardiovascular: Negative for chest pain, palpitations and leg swelling.   Gastrointestinal: Negative for abdominal distention, abdominal pain, anal bleeding, blood in stool, constipation, diarrhea, nausea, rectal pain and vomiting.   Genitourinary: Negative for difficulty urinating, dyspareunia, dysuria, flank pain, frequency, hematuria, menstrual problem, pelvic pain, urgency, vaginal bleeding and  vaginal discharge.   Musculoskeletal: Negative for arthralgias, back pain, gait problem, joint swelling, myalgias and neck stiffness.   Skin: Negative for pallor and rash.        Small Right thigh contusion   Neurological: Negative for dizziness, tremors, seizures, syncope, speech difficulty, weakness, numbness and headaches.   Hematological: Negative for adenopathy.   Psychiatric/Behavioral: Negative for agitation, confusion, hallucinations, sleep disturbance and suicidal ideas.       Objective:      Physical Exam   Constitutional: She is oriented to person, place, and time. She appears well-developed and well-nourished.   HENT:   Head: Normocephalic and atraumatic.   Bilateral turbinate congestion   Eyes: Conjunctivae and EOM are normal. Pupils are equal, round, and reactive to light. Right eye exhibits no discharge. Left eye exhibits no discharge. No scleral icterus.   Neck: Normal range of motion. Neck supple. No JVD present. No thyromegaly present.   Cardiovascular: Normal rate, regular rhythm, normal heart sounds and intact distal pulses.  Exam reveals no gallop and no friction rub.    No murmur heard.  Pulmonary/Chest: Effort normal and breath sounds normal. No respiratory distress. She has no wheezes. She has no rales. She exhibits no tenderness.   Abdominal: Soft. Bowel sounds are normal. She exhibits no distension and no mass. There is no tenderness. There is no rebound and no guarding.   Musculoskeletal: Normal range of motion. She exhibits no edema.   Lymphadenopathy:     She has no cervical adenopathy.   Neurological: She is alert and oriented to person, place, and time. She has normal reflexes. She exhibits normal muscle tone. Coordination normal.   Skin: Skin is warm and dry. No rash noted. No erythema. No pallor.   Psychiatric: She has a normal mood and affect. Her behavior is normal. Judgment and thought content normal.   Vitals reviewed.      Assessment:     Pharyngeo-esophageal dysphagia  No  diagnosis found.    Plan:       As noted above

## 2017-03-06 ENCOUNTER — LAB VISIT (OUTPATIENT)
Dept: LAB | Facility: HOSPITAL | Age: 82
End: 2017-03-06
Attending: INTERNAL MEDICINE
Payer: COMMERCIAL

## 2017-03-06 ENCOUNTER — OFFICE VISIT (OUTPATIENT)
Dept: HEMATOLOGY/ONCOLOGY | Facility: CLINIC | Age: 82
End: 2017-03-06
Payer: COMMERCIAL

## 2017-03-06 VITALS
OXYGEN SATURATION: 99 % | WEIGHT: 129.44 LBS | RESPIRATION RATE: 18 BRPM | BODY MASS INDEX: 23.22 KG/M2 | SYSTOLIC BLOOD PRESSURE: 152 MMHG | TEMPERATURE: 98 F | HEART RATE: 73 BPM | DIASTOLIC BLOOD PRESSURE: 72 MMHG

## 2017-03-06 DIAGNOSIS — D63.1 ANEMIA OF CHRONIC RENAL FAILURE, STAGE 4 (SEVERE): Primary | ICD-10-CM

## 2017-03-06 DIAGNOSIS — N18.4 CKD (CHRONIC KIDNEY DISEASE) STAGE 4, GFR 15-29 ML/MIN: ICD-10-CM

## 2017-03-06 DIAGNOSIS — D63.1 ANEMIA OF CHRONIC RENAL FAILURE, STAGE 4 (SEVERE): ICD-10-CM

## 2017-03-06 DIAGNOSIS — N18.4 ANEMIA OF CHRONIC RENAL FAILURE, STAGE 4 (SEVERE): Primary | ICD-10-CM

## 2017-03-06 DIAGNOSIS — N18.4 ANEMIA OF CHRONIC RENAL FAILURE, STAGE 4 (SEVERE): ICD-10-CM

## 2017-03-06 DIAGNOSIS — D56.3 ALPHA THALASSEMIA SILENT CARRIER: ICD-10-CM

## 2017-03-06 LAB
BASOPHILS # BLD AUTO: 0.02 K/UL
BASOPHILS NFR BLD: 0.4 %
DIFFERENTIAL METHOD: ABNORMAL
EOSINOPHIL # BLD AUTO: 0.1 K/UL
EOSINOPHIL NFR BLD: 1.4 %
ERYTHROCYTE [DISTWIDTH] IN BLOOD BY AUTOMATED COUNT: 18.2 %
HCT VFR BLD AUTO: 30.7 %
HGB BLD-MCNC: 10.2 G/DL
LYMPHOCYTES # BLD AUTO: 1.8 K/UL
LYMPHOCYTES NFR BLD: 34.9 %
MCH RBC QN AUTO: 26.6 PG
MCHC RBC AUTO-ENTMCNC: 33.2 %
MCV RBC AUTO: 80 FL
MONOCYTES # BLD AUTO: 0.6 K/UL
MONOCYTES NFR BLD: 10.9 %
NEUTROPHILS # BLD AUTO: 2.7 K/UL
NEUTROPHILS NFR BLD: 52.4 %
PLATELET # BLD AUTO: 203 K/UL
PMV BLD AUTO: 9.8 FL
RBC # BLD AUTO: 3.84 M/UL
WBC # BLD AUTO: 5.05 K/UL

## 2017-03-06 PROCEDURE — 1157F ADVNC CARE PLAN IN RCRD: CPT | Mod: S$GLB,,, | Performed by: INTERNAL MEDICINE

## 2017-03-06 PROCEDURE — 1159F MED LIST DOCD IN RCRD: CPT | Mod: S$GLB,,, | Performed by: INTERNAL MEDICINE

## 2017-03-06 PROCEDURE — 3077F SYST BP >= 140 MM HG: CPT | Mod: S$GLB,,, | Performed by: INTERNAL MEDICINE

## 2017-03-06 PROCEDURE — 3078F DIAST BP <80 MM HG: CPT | Mod: S$GLB,,, | Performed by: INTERNAL MEDICINE

## 2017-03-06 PROCEDURE — 1160F RVW MEDS BY RX/DR IN RCRD: CPT | Mod: S$GLB,,, | Performed by: INTERNAL MEDICINE

## 2017-03-06 PROCEDURE — 99999 PR PBB SHADOW E&M-EST. PATIENT-LVL III: CPT | Mod: PBBFAC,,, | Performed by: INTERNAL MEDICINE

## 2017-03-06 PROCEDURE — 85025 COMPLETE CBC W/AUTO DIFF WBC: CPT | Mod: PO

## 2017-03-06 PROCEDURE — 36415 COLL VENOUS BLD VENIPUNCTURE: CPT | Mod: PO

## 2017-03-06 PROCEDURE — 99215 OFFICE O/P EST HI 40 MIN: CPT | Mod: S$GLB,,, | Performed by: INTERNAL MEDICINE

## 2017-03-06 PROCEDURE — 1126F AMNT PAIN NOTED NONE PRSNT: CPT | Mod: S$GLB,,, | Performed by: INTERNAL MEDICINE

## 2017-03-06 NOTE — PROGRESS NOTES
Hematology/Oncology Office Note      CC:  Anemia secondary to chronic kidney disease    Referred by:  No ref. provider found    Diagnosis:  Anemia secondary to CKD    Treatment:  Procrit    HPI:  85-year-old female followed by Dr. Sundar Clark in the hematology/oncology clinic for anemia secondary to chronic kidney disease.  She is currently on weekly Procrit and presents today for CBC and additional treatment if indicated.  I am seeing her for the first time today and she has no specific complaints and reports feeling well over the previous month.          Past Medical History:   Diagnosis Date    Anemia     sickle cell trait, alpha thalessemia    Anemia of chronic renal failure 5/12/2014    Asthma, chronic     Cataract     GERD (gastroesophageal reflux disease)     Gout, arthritis     Helicobacter pylori gastritis     Hypertension     Osteoarthritis     Pulmonary HTN     Renal insufficiency          Social History:  No tobacco, alcohol, or illicit drugs    Family History: family history includes Breast cancer in her sister; Cancer in her mother and sister; Glaucoma in her sister; Liver cancer in her mother and sister; Thyroid disease in her sister. There is no history of Melanoma, Eczema, Lupus, or Psoriasis.      HPI  Review of Systems   Constitutional: Negative for activity change, appetite change, fatigue, fever and unexpected weight change.   HENT: Negative for congestion, facial swelling, mouth sores, nosebleeds, sore throat, trouble swallowing and voice change.    Eyes: Negative for photophobia, pain, discharge, itching and visual disturbance.   Respiratory: Negative for apnea, cough, choking, chest tightness and shortness of breath.    Cardiovascular: Negative for chest pain, palpitations and leg swelling.   Gastrointestinal: Negative for abdominal distention, abdominal pain, anal bleeding, blood in stool, constipation, diarrhea, nausea and vomiting.   Endocrine: Negative for cold intolerance,  heat intolerance, polydipsia and polyphagia.   Genitourinary: Negative for difficulty urinating, dyspareunia, dysuria, flank pain, frequency, hematuria, pelvic pain and vaginal bleeding.   Musculoskeletal: Negative for arthralgias, back pain, gait problem, joint swelling, myalgias and neck pain.   Skin: Negative for pallor, rash and wound.   Allergic/Immunologic: Negative for environmental allergies and immunocompromised state.   Neurological: Negative for dizziness, tremors, seizures, syncope, facial asymmetry, speech difficulty, weakness, light-headedness, numbness and headaches.   Hematological: Negative for adenopathy. Does not bruise/bleed easily.   Psychiatric/Behavioral: Negative for agitation, behavioral problems, confusion, dysphoric mood and hallucinations. The patient is not nervous/anxious and is not hyperactive.        Objective:      Physical Exam   Constitutional: She is oriented to person, place, and time. She appears well-developed and well-nourished. No distress.   Well groomed   HENT:   Head: Normocephalic and atraumatic.   Right Ear: Tympanic membrane and ear canal normal.   Left Ear: Tympanic membrane and ear canal normal.   Nose: Nose normal. Right sinus exhibits no maxillary sinus tenderness and no frontal sinus tenderness. Left sinus exhibits no maxillary sinus tenderness and no frontal sinus tenderness.   Mouth/Throat: Oropharynx is clear and moist and mucous membranes are normal. No oral lesions. Normal dentition. No oropharyngeal exudate.   Eyes: Conjunctivae, EOM and lids are normal. Pupils are equal, round, and reactive to light. Lids are everted and swept, no foreign bodies found. No scleral icterus.   Neck: Trachea normal and normal range of motion. Neck supple. No JVD present. No tracheal deviation present. No thyroid mass and no thyromegaly present.   No crepitus   Cardiovascular: Normal rate, regular rhythm, normal heart sounds, intact distal pulses and normal pulses.  Exam reveals  no gallop and no friction rub.    No murmur heard.  Pulmonary/Chest: Effort normal and breath sounds normal. She has no wheezes. She has no rales. She exhibits no tenderness.   Abdominal: Soft. Normal appearance and bowel sounds are normal. She exhibits no distension and no mass. There is no hepatosplenomegaly. There is no tenderness. There is no rebound and no guarding.   Musculoskeletal: Normal range of motion. She exhibits no edema or tenderness.   Lymphadenopathy:     She has no cervical adenopathy.   Neurological: She is alert and oriented to person, place, and time. No cranial nerve deficit. She exhibits normal muscle tone. Coordination normal.   Skin: Skin is warm and dry. No rash noted. She is not diaphoretic. No cyanosis or erythema. No pallor. Nails show no clubbing.   Psychiatric: She has a normal mood and affect. Her behavior is normal. Judgment and thought content normal.       Lab Results   Component Value Date    WBC 5.05 03/06/2017    HGB 10.2 (L) 03/06/2017    HCT 30.7 (L) 03/06/2017    MCV 80 (L) 03/06/2017     03/06/2017     Lab Results   Component Value Date    CREATININE 3.9 (H) 12/20/2016    CREATININE 3.9 (H) 12/20/2016    BUN 65 (H) 12/20/2016    BUN 65 (H) 12/20/2016     12/20/2016     12/20/2016    K 5.0 12/20/2016    K 5.0 12/20/2016     12/20/2016     12/20/2016    CO2 19 (L) 12/20/2016    CO2 19 (L) 12/20/2016     Lab Results   Component Value Date    ALT 14 04/11/2016    AST 28 04/11/2016    ALKPHOS 93 04/11/2016    BILITOT 0.3 04/11/2016         Assessment:       85-year-old female followed by Dr. Marcus Clark in the hematology/oncology clinic for anemia of chronic kidney disease.  She presents today for routine visit and I am seeing her for the first time.  Hemoglobin is noted to be 10.2, therefore, we will hold treatment and have the patient follow-up in 2 weeks with repeat CBC with plans to resume treatment if hemoglobin falls below 10.      Anemia  secondary to chronic renal disease:  --Hold treatment today as hemoglobin is greater than 10  --Follow-up in 2 weeks with repeat CBC and Procrit if hemoglobin is less than 10

## 2017-03-06 NOTE — MR AVS SNAPSHOT
[facility]  [address] [facility] [place] [address]  Phone: [phone]  Fax: [phone]                  Gloria Sanz   3/6/2017 11:20 AM   Office Visit    Description:  Female : 1931   Provider:  Dutch Napoles Jr., MD   Department:  [facility] - Hemotology Oncology           Diagnoses this Visit        Comments    Anemia of chronic renal failure, stage 4 (severe)    -  Primary     CKD (chronic kidney disease) stage 4, GFR 15-29 ml/min         Alpha thalassemia silent carrier                To Do List           Future Appointments        Provider Department Dept Phone    3/15/2017 9:10 AM [facility] CT1 LIMIT 500 LBS [facility] [phone]    3/15/2017 9:40 AM Ivon Feliz NP [facility] [phone]    3/22/2017 11:10 AM LABORATORY, [facility] [phone]    3/22/2017 11:20 AM Marcus Clark MD [facility] [phone]    3/22/2017 11:45 AM CHAIR 03 [facility] [phone]      Goals (5 Years of Data)     None      [facility]     [facility] Care [facility] -  Assistance  Registered nurses in the [facility] provide clinical advisement, health education, appointment booking, and other advisory services.  Call for this free service at [phone].             Medications           Message regarding Medications     Verify the changes and/or additions to your medication regime listed below are the same as discussed with your clinician today.  If any of these changes or additions are incorrect, please notify your healthcare provider.             Verify that the below list of medications is an accurate representation of the medications you are currently taking.  If none reported, the list may be blank. If incorrect, please contact your healthcare provider. Carry this list with you in case of emergency.           Current Medications     albuterol 90  mcg/actuation inhaler Inhale 2 puffs into the lungs every 6 (six) hours as needed for Wheezing.    ammonium lactate (AMLACTIN) 12 % lotion Use daily.  Apply to damp skin after bathing.    aspirin (ECOTRIN) 81 MG EC tablet Take 81 mg by mouth once daily.    B complex vitamins (B COMPLEX) TbSR Take by mouth. 1 Tablet Extended Release Oral Every day    CALCIUM CARBONATE/VITAMIN D3 (CALCIUM 500 + D, D3, ORAL) Take by mouth. 1 Tablet Oral Twice a day    cloNIDine (CATAPRES) 0.3 MG tablet take 1 tablet by mouth twice a day    diltiazem (CARDIZEM CD) 180 MG 24 hr capsule Take 1 capsule (180 mg total) by mouth once daily.    fluticasone (FLONASE) 50 mcg/actuation nasal spray 2 sprays by Each Nare route daily as needed.    fluticasone-vilanterol (BREO) 100-25 mcg/dose diskus inhaler Inhale 1 puff into the lungs once daily.    hydrALAZINE (APRESOLINE) 50 MG tablet Take 1 tablet (50 mg total) by mouth 3 (three) times daily.    ketoconazole (NIZORAL) 2 % shampoo Wash hair with medicated shampoo at least 2x/week - let sit on scalp at least 5 minutes prior to rinsing    sodium bicarbonate 650 MG tablet Take 1 tablet (650 mg total) by mouth 3 (three) times daily.    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 3 nights/week           Clinical Reference Information           Your Vitals Were     BP Pulse Temp Resp Weight SpO2    152/72 73 97.6 °F (36.4 °C) (Oral) 18 58.7 kg (129 lb 6.6 oz) 99%    BMI                23.22 kg/m2          Blood Pressure          Most Recent Value    BP  (!)  152/72      Allergies as of 3/6/2017     Allopurinol Analogues    Diflucan [Fluconazole]    Ace Inhibitors    Amlodipine    Metronidazole    Phenytoin Sodium Extended      Immunizations Administered on Date of Encounter - 3/6/2017     None      Language Assistance Services     ATTENTION: Language assistance services are available, free of charge. Please call [phone].      ATENCIÓN: Si habla español, tiene a sargent disposición servicios gratuitos de  asistencia lingüística. Rod baez [phone].     LASHON Ý: N?u b?n nói Ti?ng Vi?t, có các d?ch v? h? tr? ngôn ng? mi?n phí dành cho b?n. G?i s? [phone].         [facility] complies with applicable Federal civil rights laws and does not discriminate on the basis of race, color, national origin, age, disability, or sex.

## 2017-03-14 ENCOUNTER — TELEPHONE (OUTPATIENT)
Dept: RADIOLOGY | Facility: HOSPITAL | Age: 82
End: 2017-03-14

## 2017-03-15 ENCOUNTER — TELEPHONE (OUTPATIENT)
Dept: PULMONOLOGY | Facility: CLINIC | Age: 82
End: 2017-03-15

## 2017-03-15 ENCOUNTER — OFFICE VISIT (OUTPATIENT)
Dept: PULMONOLOGY | Facility: CLINIC | Age: 82
End: 2017-03-15
Payer: COMMERCIAL

## 2017-03-15 ENCOUNTER — HOSPITAL ENCOUNTER (OUTPATIENT)
Dept: RADIOLOGY | Facility: HOSPITAL | Age: 82
Discharge: HOME | End: 2017-03-15
Attending: INTERNAL MEDICINE
Payer: COMMERCIAL

## 2017-03-15 VITALS
WEIGHT: 128.75 LBS | DIASTOLIC BLOOD PRESSURE: 70 MMHG | SYSTOLIC BLOOD PRESSURE: 128 MMHG | HEART RATE: 82 BPM | HEIGHT: 63 IN | OXYGEN SATURATION: 99 % | BODY MASS INDEX: 22.81 KG/M2 | RESPIRATION RATE: 16 BRPM

## 2017-03-15 DIAGNOSIS — J45.30 MILD PERSISTENT ASTHMA WITHOUT COMPLICATION: Primary | ICD-10-CM

## 2017-03-15 DIAGNOSIS — R91.8 PULMONARY NODULES: ICD-10-CM

## 2017-03-15 DIAGNOSIS — R91.8 LUNG INFILTRATE ON CT: ICD-10-CM

## 2017-03-15 DIAGNOSIS — J45.20 MILD INTERMITTENT ASTHMA WITHOUT COMPLICATION: Primary | ICD-10-CM

## 2017-03-15 PROCEDURE — 3074F SYST BP LT 130 MM HG: CPT | Mod: S$GLB,,, | Performed by: NURSE PRACTITIONER

## 2017-03-15 PROCEDURE — 1159F MED LIST DOCD IN RCRD: CPT | Mod: S$GLB,,, | Performed by: NURSE PRACTITIONER

## 2017-03-15 PROCEDURE — 3078F DIAST BP <80 MM HG: CPT | Mod: S$GLB,,, | Performed by: NURSE PRACTITIONER

## 2017-03-15 PROCEDURE — 1126F AMNT PAIN NOTED NONE PRSNT: CPT | Mod: S$GLB,,, | Performed by: NURSE PRACTITIONER

## 2017-03-15 PROCEDURE — 71250 CT THORAX DX C-: CPT | Mod: TC,PO

## 2017-03-15 PROCEDURE — 99999 PR PBB SHADOW E&M-EST. PATIENT-LVL IV: CPT | Mod: PBBFAC,,, | Performed by: NURSE PRACTITIONER

## 2017-03-15 PROCEDURE — 71250 CT THORAX DX C-: CPT | Mod: 26,,, | Performed by: RADIOLOGY

## 2017-03-15 PROCEDURE — 1160F RVW MEDS BY RX/DR IN RCRD: CPT | Mod: S$GLB,,, | Performed by: NURSE PRACTITIONER

## 2017-03-15 PROCEDURE — 1157F ADVNC CARE PLAN IN RCRD: CPT | Mod: S$GLB,,, | Performed by: NURSE PRACTITIONER

## 2017-03-15 PROCEDURE — 99214 OFFICE O/P EST MOD 30 MIN: CPT | Mod: S$GLB,,, | Performed by: NURSE PRACTITIONER

## 2017-03-15 NOTE — PROGRESS NOTES
Subjective:      Gloria Sanz is a 85 y.o. female   This is a follow-up appointment for review of CT of chest order to evaluate left lung tree in bud opacity with nodularity.   Last seen by Dr. Bennett 2016.  Asthma score is 21. Daughter and patient report no asthma difficulty.   Presently well controlled with adding Breo at night and 2 puffs of albuterol in morning, no wheezing or shortness of breath when albuterol is used in morning.   Advised of Albuterol being a rescue inhaler for break through asthma symptoms of shortness of breath or wheezing.   She finds adding Breo has really helped and feels her asthma is better controlled.     Daughter and patient continue to reported compliance with her asthma maintenance medications  Immunizations: She continues to decline influenza vaccination.   With regard to her pulmonary nodules that were identified in the right middle lobe this is been stable and no further CT scan recommended.    There was some hazy opacities associated in the left lower lobe.  That only need a short interval chest CT scan in 3 months, patient presents today for review of CT of chest done today.   She no longer has an outside dog at home, her family dog, Aishwarya  this past week. She might replace the dog in the future.   Her spirometry at her last visit in December was within normal limits, FVC had improved by 32% with bronchodilator.   Has carpet in bedrooms only, vacuuming is done once a week.     The following portions of the patient's history were reviewed and updated as appropriate:   She  has a past medical history of Anemia; Anemia of chronic renal failure (2014); Asthma, chronic; Cataract; GERD (gastroesophageal reflux disease); Gout, arthritis; Helicobacter pylori gastritis; Hypertension; Osteoarthritis; Pulmonary HTN; and Renal insufficiency.  She  does not have any pertinent problems on file.  She  has a past surgical history that includes Thyroid surgery; endometrial  polyp; Cholecystectomy; and Fetal blood transfusion (12/11/2015).  Her family history includes Breast cancer in her sister; Cancer in her mother and sister; Glaucoma in her sister; Liver cancer in her mother and sister; Thyroid disease in her sister. There is no history of Melanoma, Eczema, Lupus, or Psoriasis.  She  reports that she has never smoked. She has never used smokeless tobacco. She reports that she does not drink alcohol or use illicit drugs.  She has a current medication list which includes the following prescription(s): albuterol, ammonium lactate, aspirin, vitamin b complex, calcium carbonate/vitamin d3, clonidine, diltiazem, fluticasone, fluticasone-vilanterol, hydralazine, ketoconazole, sodium bicarbonate, and urea.  Current Outpatient Prescriptions on File Prior to Visit   Medication Sig Dispense Refill    albuterol 90 mcg/actuation inhaler Inhale 2 puffs into the lungs every 6 (six) hours as needed for Wheezing. 1 each 11    ammonium lactate (AMLACTIN) 12 % lotion Use daily.  Apply to damp skin after bathing. 225 g 11    aspirin (ECOTRIN) 81 MG EC tablet Take 81 mg by mouth once daily.      B complex vitamins (B COMPLEX) TbSR Take by mouth. 1 Tablet Extended Release Oral Every day      CALCIUM CARBONATE/VITAMIN D3 (CALCIUM 500 + D, D3, ORAL) Take by mouth. 1 Tablet Oral Twice a day      cloNIDine (CATAPRES) 0.3 MG tablet take 1 tablet by mouth twice a day 60 tablet 3    diltiazem (CARDIZEM CD) 180 MG 24 hr capsule Take 1 capsule (180 mg total) by mouth once daily. 30 capsule 11    fluticasone (FLONASE) 50 mcg/actuation nasal spray 2 sprays by Each Nare route daily as needed.  0    fluticasone-vilanterol (BREO) 100-25 mcg/dose diskus inhaler Inhale 1 puff into the lungs once daily. 60 each 11    hydrALAZINE (APRESOLINE) 50 MG tablet Take 1 tablet (50 mg total) by mouth 3 (three) times daily. 90 tablet 11    ketoconazole (NIZORAL) 2 % shampoo Wash hair with medicated shampoo at least  "2x/week - let sit on scalp at least 5 minutes prior to rinsing 120 mL 5    sodium bicarbonate 650 MG tablet Take 1 tablet (650 mg total) by mouth 3 (three) times daily. 90 tablet 11    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 3 nights/week 198.6 g 3     No current facility-administered medications on file prior to visit.      She is allergic to allopurinol analogues; diflucan [fluconazole]; ace inhibitors; amlodipine; metronidazole; and phenytoin sodium extended..    Review of Systems  A comprehensive review of systems was negative.      Objective:        /70 (BP Location: Right arm, Patient Position: Sitting, BP Method: Manual)  Pulse 82  Resp 16  Ht 5' 2.6" (1.59 m)  Wt 58.4 kg (128 lb 12 oz)  SpO2 99%  BMI 23.1 kg/m2  General appearance: alert, appears stated age, cooperative and no distress  Head: atraumatic  Eyes: negative findings: conjunctivae and sclerae normal and corneas clear  Throat: lips, mucosa, and tongue normal; teeth and gums normal  Lungs: clear to auscultation bilaterally  Heart: regular rate and rhythm, S1, S2 normal, no murmur, click, rub or gallop  Extremities: extremities normal, atraumatic, no cyanosis or edema  Skin: Skin color, texture, turgor normal. No rashes or lesions  Lymph nodes: Cervical, supraclavicular, and axillary nodes normal.  Neurologic: Grossly normal    Diagnostic Review  CT chest without contrast 3/15/2017  Reviewed in clinic with patient and daughter.  Impression     1.  Subtle tree in bud nodular opacities in the periphery of the left greater than right lower lobes appear similar to prior with some improved associated subpleural nodularity in the left lower lobe.  Findings are again favored to be infectious/inflammatory nature.    2.  New 4.6 mm noncalcified pulmonary nodule in the right upper lobe. Per Fleischner Society guidelines for nodule >4-6mm; in a low risk patient, consider 12 month CT chest follow up.  In a high risk patient/smoker, consider initial " follow-up CT chest at 6-12 months then at 18-24 months if unchanged to exclude neoplasia.  If stable at that time, no further follow-up needed.    3.  Other stable findings as above.       CT of chest 12/15/2016  1. Stable 7 mm noncalcified pulmonary nodule within the right middle lobe.  No further followup of this nodule is needed although, there are some new tree in bud type opacities and nodular densities within the left lung base.    Consider short term followup in 3 months to assure stability/resolution.  The findings in the left lung base are favored to be secondary to an infectious or inflammatory process.    Pulmonary function tests: FEV1: 1.34  (94 % predicted), FVC:  1.54 (83 % predicted), FEV1/FVC:  87     Normal however exaggerated FVC response with BRD: 32%  Assessment:      Problem List Items Addressed This Visit     Mild persistent asthma without complication - Primary    Relevant Orders    Spirometry with/without bronchodilator      Other Visit Diagnoses     Pulmonary nodules        stable tree in bud opacity, improved nodules RLL. New 4.6 cm nodule RUL, per Fleischner guidelines, fu CT leanne for in 12mos.     Relevant Orders    CT Chest Without Contrast         Plan:      Gloria was seen today for lung infiltrate.    Diagnoses and all orders for this visit:    Mild persistent asthma without complication  Comments:  Improved control with addition of Breo daily. ACT 21 suggesting well controlled Asthma. Continue Breo daily and albuterol prn wheezing.   Orders:  -     Spirometry with/without bronchodilator; Future    Pulmonary nodules  Comments:  stable tree in bud opacity, improved nodules RLL. New 4.6 cm nodule RUL, per Fleischner guidelines, fu CT leanne for in 12mos.   Orders:  -     CT Chest Without Contrast      Return in about 3 months (around 6/15/2017) for asthma follow up w/wang w/review melissa. 1 yr fu with me review CT of chest fu pul nodules. .    Discussed diagnosis, its evaluation,  treatment and usual course. All questions answered.    Ivon Feliz NP    Please insert appropriate

## 2017-03-15 NOTE — MR AVS SNAPSHOT
[facility] - Pulmonary Services  [address] [facility] [place] [address]  Phone: [phone]  Fax: [phone]                  Gloria Sanz   3/15/2017 9:40 AM   Office Visit    Description:  Female : 1931   Provider:  Ivon Feliz NP   Department:  [facility] - Pulmonary Services           Reason for Visit     Lung Infiltrate           Diagnoses this Visit        Comments    Mild persistent asthma without complication    -  Primary Improved control with addition of Breo daily. ACT 21 suggesting well controlled Asthma. Continue Breo daily and albuterol prn wheezing.     Pulmonary nodules                To Do List           Future Appointments        Provider Department Dept Phone    3/22/2017 11:10 AM LABORATORY, [facility] [phone]    3/22/2017 11:20 AM Marcus Clark MD [facility] [phone]    3/22/2017 11:45 AM CHAIR 03 [facility] [phone]    2017 8:15 AM [facility] XR2 [facility] [phone]    2017 8:40 AM PULMONARY LAB, [facility] [phone]      Goals (5 Years of Data)     None      Follow-Up and Disposition     Return in about 3 months (around 6/15/2017) for asthma follow up w/brainambalwinder w/review melissa. 1 yr fu with me review CT of chest fu pul nodules. .      [facility]     [facility] Care [facility] - 24/ Assistance  Registered nurses in the [facility] provide clinical advisement, health education, appointment booking, and other advisory services.  Call for this free service at [phone].             Medications           Message regarding Medications     Verify the changes and/or additions to your medication regime listed below are the same as discussed with your clinician today.  If any of these changes or additions are incorrect, please notify your healthcare provider.             Verify that the below list of medications is an  "accurate representation of the medications you are currently taking.  If none reported, the list may be blank. If incorrect, please contact your healthcare provider. Carry this list with you in case of emergency.           Current Medications     albuterol 90 mcg/actuation inhaler Inhale 2 puffs into the lungs every 6 (six) hours as needed for Wheezing.    ammonium lactate (AMLACTIN) 12 % lotion Use daily.  Apply to damp skin after bathing.    aspirin (ECOTRIN) 81 MG EC tablet Take 81 mg by mouth once daily.    B complex vitamins (B COMPLEX) TbSR Take by mouth. 1 Tablet Extended Release Oral Every day    CALCIUM CARBONATE/VITAMIN D3 (CALCIUM 500 + D, D3, ORAL) Take by mouth. 1 Tablet Oral Twice a day    cloNIDine (CATAPRES) 0.3 MG tablet take 1 tablet by mouth twice a day    diltiazem (CARDIZEM CD) 180 MG 24 hr capsule Take 1 capsule (180 mg total) by mouth once daily.    fluticasone (FLONASE) 50 mcg/actuation nasal spray 2 sprays by Each Nare route daily as needed.    fluticasone-vilanterol (BREO) 100-25 mcg/dose diskus inhaler Inhale 1 puff into the lungs once daily.    hydrALAZINE (APRESOLINE) 50 MG tablet Take 1 tablet (50 mg total) by mouth 3 (three) times daily.    ketoconazole (NIZORAL) 2 % shampoo Wash hair with medicated shampoo at least 2x/week - let sit on scalp at least 5 minutes prior to rinsing    sodium bicarbonate 650 MG tablet Take 1 tablet (650 mg total) by mouth 3 (three) times daily.    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 3 nights/week           Clinical Reference Information           Your Vitals Were     BP Pulse Resp Height Weight SpO2    128/70 (BP Location: Right arm, Patient Position: Sitting, BP Method: Manual) 82 16 5' 2.6" (1.59 m) 58.4 kg (128 lb 12 oz) 99%    BMI                23.1 kg/m2          Blood Pressure          Most Recent Value    BP  128/70      Allergies as of 3/15/2017     Allopurinol Analogues    Diflucan [Fluconazole]    Ace Inhibitors    Amlodipine    Metronidazole    " Phenytoin Sodium Extended      Immunizations Administered on Date of Encounter - 3/15/2017     None      Orders Placed During Today's Visit      Normal Orders This Visit    CT Chest Without Contrast     Future Labs/Procedures Expected by Expires    Spirometry with/without bronchodilator  As directed 3/15/2018      Language Assistance Services     ATTENTION: Language assistance services are available, free of charge. Please call [phone].      ATENCIÓN: Si habla español, tiene a sargent disposición servicios gratuitos de asistencia lingüística. Llame al [phone].     CHÚ Ý: N?u b?n nói Ti?ng Vi?t, có các d?ch v? h? tr? ngôn ng? mi?n phí dành cho b?n. G?i s? [phone].         [facility] - [facility] complies with applicable Federal civil rights laws and does not discriminate on the basis of race, color, national origin, age, disability, or sex.

## 2017-03-22 ENCOUNTER — OFFICE VISIT (OUTPATIENT)
Dept: HEMATOLOGY/ONCOLOGY | Facility: CLINIC | Age: 82
End: 2017-03-22
Payer: COMMERCIAL

## 2017-03-22 VITALS
DIASTOLIC BLOOD PRESSURE: 66 MMHG | HEART RATE: 76 BPM | BODY MASS INDEX: 22.93 KG/M2 | TEMPERATURE: 98 F | WEIGHT: 129.44 LBS | RESPIRATION RATE: 18 BRPM | OXYGEN SATURATION: 99 % | SYSTOLIC BLOOD PRESSURE: 118 MMHG | HEIGHT: 63 IN

## 2017-03-22 DIAGNOSIS — D56.3 ALPHA THALASSEMIA SILENT CARRIER: ICD-10-CM

## 2017-03-22 DIAGNOSIS — D63.1 ANEMIA OF CHRONIC RENAL FAILURE, STAGE 3 (MODERATE): Primary | ICD-10-CM

## 2017-03-22 DIAGNOSIS — N18.4 CKD (CHRONIC KIDNEY DISEASE) STAGE 4, GFR 15-29 ML/MIN: ICD-10-CM

## 2017-03-22 DIAGNOSIS — N18.30 ANEMIA OF CHRONIC RENAL FAILURE, STAGE 3 (MODERATE): Primary | ICD-10-CM

## 2017-03-22 PROCEDURE — 1126F AMNT PAIN NOTED NONE PRSNT: CPT | Mod: S$GLB,,, | Performed by: INTERNAL MEDICINE

## 2017-03-22 PROCEDURE — 3078F DIAST BP <80 MM HG: CPT | Mod: S$GLB,,, | Performed by: INTERNAL MEDICINE

## 2017-03-22 PROCEDURE — 1159F MED LIST DOCD IN RCRD: CPT | Mod: S$GLB,,, | Performed by: INTERNAL MEDICINE

## 2017-03-22 PROCEDURE — 99999 PR PBB SHADOW E&M-EST. PATIENT-LVL III: CPT | Mod: PBBFAC,,, | Performed by: INTERNAL MEDICINE

## 2017-03-22 PROCEDURE — 1160F RVW MEDS BY RX/DR IN RCRD: CPT | Mod: S$GLB,,, | Performed by: INTERNAL MEDICINE

## 2017-03-22 PROCEDURE — 99214 OFFICE O/P EST MOD 30 MIN: CPT | Mod: S$GLB,,, | Performed by: INTERNAL MEDICINE

## 2017-03-22 PROCEDURE — 1157F ADVNC CARE PLAN IN RCRD: CPT | Mod: S$GLB,,, | Performed by: INTERNAL MEDICINE

## 2017-03-22 PROCEDURE — 3074F SYST BP LT 130 MM HG: CPT | Mod: S$GLB,,, | Performed by: INTERNAL MEDICINE

## 2017-03-22 NOTE — PROGRESS NOTES
Subjective:       Patient ID: Gloria Sanz is a 85 y.o. female.    Chief Complaint: No chief complaint on file.    HPI   This 84 year old AA lady comes for follow uo her anemia associated to chronic renal failure.  She is known to us because in MAy 2010 had a cbc that showed a hemoglobin of 10.6 with an MCV of 79.4. White cell count was 4,380 (differential included 17% monocytes) and a platelet count of 217,000 Review of information in the computer indicated that the patient has had mild anemia dating back a number of years\.    In 1991, her hemoglobin was 11.7 with an MCV of 84. Her MCV through the years has been anywhere from 79-84. The patient has had several iron studies that showed an elevated ferritin, a creatinine of 1.7 as well as serum protein electrophoresis that has failed to show a monoclonal spike.    . B12 levels have been normal.    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    The patient comes today for routine follow up.    At the end dec 2011 she had a severe epistaxis requiring a blood transfusion. She ended up having surgery for nasal polyps In May 2012 she ha had a drop of hr HGB to 8.8 and her creatinine was 1.8 She slowly improved ion her own. SPEp showed a polyclonal gammopathy and her free light chains showed elevations of both the kappa and lambda light chains which goes against clonality.        She had the zoster vaccine in 2008    She is on procrit every 2 weeks when needed    She is know to have a sub-centimeter pulmonary nodule in the right middle lobe, Ct scan in December 2015 showed stability.A CT scan done 3/1027 showed as new nodule, measuring 4.6 mm in the right lung.  She is scheduled to have it repeated in a year ng A multinodular thyroid was found and an US was suggested.The Us showed the same.        She has recurrent epistaxis  which is felt to be due to HBP  During the last visit she complained of some difficulty/pain on swallowing. She saw Dr Wright. An esophagogram was unremarkable  She comes for follow up    ALLERGIES: See med card  MEDICATIONS: See MedCard.  PREVIOUS SURGERIES: Thyroid nodule removed many years ago and endometrial  polyp removed in 2009.  Nasal polyps surgery around april 2012  SOCIAL HISTORY: She is  with three children. She lives in [place].  She does not smoke or drink. 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  FAMILY HISTORY: Two sisters have liver cancer, and another one has breast  cancer. Mother had cancer of the gallbladder. Father had a heart attack.  No diabetes in the family.  PAST MEDICAL HISTORY:  1. 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  2. Alpha globin gene rearrangement test showed that she is an alpha  thalassemia carrier.  3. Chronic anemia.  4-elevated creatinine  5. Hypertension.  6. Status post removal of endometrial polyp.  7. Epistaxis-recurrent felt to be due to HBP.  8. hx of epistaxis Dec 2011 ( nasal polyps)               Review of Systems   Constitutional: Negative.    HENT: Negative.    Eyes: Negative.    Respiratory: Negative.  Negative for cough and wheezing.    Cardiovascular: Negative.  Negative for chest pain.   Gastrointestinal: Negative.    Genitourinary: Negative.    Neurological: Negative.    Psychiatric/Behavioral: Negative.        Objective:      Physical Exam   Constitutional: She is oriented to person, place, and time. She appears well-developed. No distress.   HENT:   Head: Normocephalic.   Right Ear: Tympanic membrane, external ear and ear canal normal.   Left Ear: Tympanic membrane, external ear and ear canal normal.   Nose: Nose normal. Right sinus exhibits no maxillary sinus tenderness and no frontal sinus tenderness. Left sinus exhibits no maxillary sinus tenderness and no frontal sinus tenderness.    Mouth/Throat: Oropharynx is clear and moist and mucous membranes are normal.   Teeth normal.  Gums normal.   Eyes: Conjunctivae and lids are normal. Pupils are equal, round, and reactive to light.   Neck: Normal carotid pulses, no hepatojugular reflux and no JVD present. Carotid bruit is not present. No tracheal deviation present. No thyroid mass and no thyromegaly present.   Cardiovascular: Normal rate, regular rhythm, S1 normal, S2 normal, normal heart sounds and intact distal pulses.  Exam reveals no gallop and no friction rub.    No murmur heard.  Carotid exam normal   Pulmonary/Chest: Effort normal and breath sounds normal. No accessory muscle usage. No respiratory distress. She has no wheezes. She has no rales. She exhibits no tenderness.   Abdominal: Soft. Normal appearance. She exhibits no distension and no mass. There is no splenomegaly or hepatomegaly. There is no tenderness. There is no rebound and no guarding.   Musculoskeletal: Normal range of motion. She exhibits no edema or tenderness.        Right hand: Normal.        Left hand: Normal.       Lymphadenopathy:     She has no cervical adenopathy.     She has no axillary adenopathy.        Right: No inguinal and no supraclavicular adenopathy present.        Left: No inguinal and no supraclavicular adenopathy present.   Neurological: She is alert and oriented to person, place, and time. She has normal strength. No cranial nerve deficit. Coordination normal.   Skin: Skin is warm and dry. No rash noted. She is not diaphoretic. No cyanosis or erythema. No pallor. Nails show no clubbing.   Psychiatric: She has a normal mood and affect. Her behavior is normal. Judgment and thought content normal.       Wt Readings from Last 3 Encounters:   03/22/17 58.7 kg (129 lb 6.6 oz)   03/15/17 58.4 kg (128 lb 12 oz)   03/06/17 58.7 kg (129 lb 6.6 oz)     Temp Readings from Last 3 Encounters:   03/22/17 97.6 °F (36.4 °C) (Oral)   03/06/17 97.6 °F (36.4 °C) (Oral)    02/20/17 97.6 °F (36.4 °C)     BP Readings from Last 3 Encounters:   03/22/17 118/66   03/15/17 128/70   03/06/17 (!) 152/72     Pulse Readings from Last 3 Encounters:   03/22/17 76   03/15/17 82   03/06/17 73       Assessment:       1. Anemia of chronic renal failure, stage 3 (moderate)    2. Alpha thalassemia silent carrier    3. CKD (chronic kidney disease) stage 4, GFR 15-29 ml/min        Plan:       Lab Results   Component Value Date    WBC 4.56 03/22/2017    HGB 10.1 (L) 03/22/2017    HCT 30.1 (L) 03/22/2017    MCV 79 (L) 03/22/2017     03/22/2017       No need for procrit now. See me in 4 weeks with a  Cbc.  Follow up with nephrrology regarding CRF.  CT scand due 3/2017

## 2017-04-20 ENCOUNTER — INFUSION (OUTPATIENT)
Dept: INFUSION THERAPY | Facility: HOSPITAL | Age: 82
End: 2017-04-20
Attending: INTERNAL MEDICINE
Payer: COMMERCIAL

## 2017-04-20 ENCOUNTER — OFFICE VISIT (OUTPATIENT)
Dept: HEMATOLOGY/ONCOLOGY | Facility: CLINIC | Age: 82
End: 2017-04-20
Payer: COMMERCIAL

## 2017-04-20 ENCOUNTER — LAB VISIT (OUTPATIENT)
Dept: LAB | Facility: HOSPITAL | Age: 82
End: 2017-04-20
Attending: INTERNAL MEDICINE
Payer: COMMERCIAL

## 2017-04-20 VITALS
DIASTOLIC BLOOD PRESSURE: 70 MMHG | WEIGHT: 131.19 LBS | HEIGHT: 63 IN | OXYGEN SATURATION: 99 % | BODY MASS INDEX: 23.39 KG/M2 | BODY MASS INDEX: 23.25 KG/M2 | TEMPERATURE: 97 F | RESPIRATION RATE: 18 BRPM | HEART RATE: 86 BPM | WEIGHT: 131.19 LBS | SYSTOLIC BLOOD PRESSURE: 110 MMHG

## 2017-04-20 DIAGNOSIS — N18.30 ANEMIA OF CHRONIC RENAL FAILURE, STAGE 3 (MODERATE): Primary | ICD-10-CM

## 2017-04-20 DIAGNOSIS — N18.30 ANEMIA OF CHRONIC RENAL FAILURE, STAGE 3 (MODERATE): ICD-10-CM

## 2017-04-20 DIAGNOSIS — N18.4 CKD (CHRONIC KIDNEY DISEASE) STAGE 4, GFR 15-29 ML/MIN: ICD-10-CM

## 2017-04-20 DIAGNOSIS — N18.4 ANEMIA OF CHRONIC RENAL FAILURE, STAGE 4 (SEVERE): Primary | ICD-10-CM

## 2017-04-20 DIAGNOSIS — D63.1 ANEMIA OF CHRONIC RENAL FAILURE, STAGE 3 (MODERATE): Primary | ICD-10-CM

## 2017-04-20 DIAGNOSIS — D56.3 ALPHA THALASSEMIA SILENT CARRIER: ICD-10-CM

## 2017-04-20 DIAGNOSIS — D63.1 ANEMIA OF CHRONIC RENAL FAILURE, STAGE 3 (MODERATE): ICD-10-CM

## 2017-04-20 DIAGNOSIS — D63.1 ANEMIA OF CHRONIC RENAL FAILURE, STAGE 4 (SEVERE): Primary | ICD-10-CM

## 2017-04-20 LAB
BASOPHILS # BLD AUTO: 0.01 K/UL
BASOPHILS NFR BLD: 0.2 %
DIFFERENTIAL METHOD: ABNORMAL
EOSINOPHIL # BLD AUTO: 0.1 K/UL
EOSINOPHIL NFR BLD: 1.4 %
ERYTHROCYTE [DISTWIDTH] IN BLOOD BY AUTOMATED COUNT: 15.5 %
HCT VFR BLD AUTO: 26.5 %
HGB BLD-MCNC: 8.9 G/DL
LYMPHOCYTES # BLD AUTO: 1.9 K/UL
LYMPHOCYTES NFR BLD: 33.7 %
MCH RBC QN AUTO: 26.5 PG
MCHC RBC AUTO-ENTMCNC: 33.6 %
MCV RBC AUTO: 79 FL
MONOCYTES # BLD AUTO: 0.9 K/UL
MONOCYTES NFR BLD: 15.5 %
NEUTROPHILS # BLD AUTO: 2.8 K/UL
NEUTROPHILS NFR BLD: 49.2 %
PLATELET # BLD AUTO: 181 K/UL
PMV BLD AUTO: 9.6 FL
RBC # BLD AUTO: 3.36 M/UL
WBC # BLD AUTO: 5.75 K/UL

## 2017-04-20 PROCEDURE — 99999 PR PBB SHADOW E&M-EST. PATIENT-LVL III: CPT | Mod: PBBFAC,,, | Performed by: INTERNAL MEDICINE

## 2017-04-20 PROCEDURE — 1159F MED LIST DOCD IN RCRD: CPT | Mod: S$GLB,,, | Performed by: INTERNAL MEDICINE

## 2017-04-20 PROCEDURE — 96372 THER/PROPH/DIAG INJ SC/IM: CPT | Mod: PO

## 2017-04-20 PROCEDURE — 1126F AMNT PAIN NOTED NONE PRSNT: CPT | Mod: S$GLB,,, | Performed by: INTERNAL MEDICINE

## 2017-04-20 PROCEDURE — 36415 COLL VENOUS BLD VENIPUNCTURE: CPT | Mod: PO

## 2017-04-20 PROCEDURE — 85025 COMPLETE CBC W/AUTO DIFF WBC: CPT | Mod: PO

## 2017-04-20 PROCEDURE — 63600175 PHARM REV CODE 636 W HCPCS: Mod: PO | Performed by: INTERNAL MEDICINE

## 2017-04-20 PROCEDURE — 3074F SYST BP LT 130 MM HG: CPT | Mod: S$GLB,,, | Performed by: INTERNAL MEDICINE

## 2017-04-20 PROCEDURE — 99214 OFFICE O/P EST MOD 30 MIN: CPT | Mod: 25,S$GLB,, | Performed by: INTERNAL MEDICINE

## 2017-04-20 PROCEDURE — 1160F RVW MEDS BY RX/DR IN RCRD: CPT | Mod: S$GLB,,, | Performed by: INTERNAL MEDICINE

## 2017-04-20 PROCEDURE — 1157F ADVNC CARE PLAN IN RCRD: CPT | Mod: 8P,S$GLB,, | Performed by: INTERNAL MEDICINE

## 2017-04-20 PROCEDURE — 3078F DIAST BP <80 MM HG: CPT | Mod: S$GLB,,, | Performed by: INTERNAL MEDICINE

## 2017-04-20 RX ADMIN — ERYTHROPOIETIN 20000 UNITS: 20000 INJECTION, SOLUTION INTRAVENOUS; SUBCUTANEOUS at 11:04

## 2017-04-20 NOTE — PROGRESS NOTES
Subjective:       Patient ID: Gloria Sanz is a 85 y.o. female.    Chief Complaint: No chief complaint on file.    HPI   This 84 year old AA lady comes for follow uo her anemia associated to chronic renal failure.  She is known to us because in MAy 2010 had a cbc that showed a hemoglobin of 10.6 with an MCV of 79.4. White cell count was 4,380 (differential included 17% monocytes) and a platelet count of 217,000 Review of information in the computer indicated that the patient has had mild anemia dating back a number of years\.    In 1991, her hemoglobin was 11.7 with an MCV of 84. Her MCV through the years has been anywhere from 79-84. The patient has had several iron studies that showed an elevated ferritin, a creatinine of 1.7 as well as serum protein electrophoresis that has failed to show a monoclonal spike.    . B12 levels have been normal.    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    The patient comes today for routine follow up.    At the end dec 2011 she had a severe epistaxis requiring a blood transfusion. She ended up having surgery for nasal polyps In May 2012 she ha had a drop of hr HGB to 8.8 and her creatinine was 1.8 She slowly improved ion her own. SPEp showed a polyclonal gammopathy and her free light chains showed elevations of both the kappa and lambda light chains which goes against clonality.        She had the zoster vaccine in 2008    She is on procrit every 2 weeks when needed    She is know to have a sub-centimeter pulmonary nodule in the right middle lobe, Ct scan in December 2015 showed stability.A CT scan done 3/1027 showed as new nodule, measuring 4.6 mm in the right lung.  She is scheduled to have it repeated in a year ng A multinodular thyroid was found and an US was suggested.The Us showed the same.        She has recurrent epistaxis  which is felt to be due to HBP  D e  She comes for follow up   ALLERGIES: See med card  MEDICATIONS: See MedCard.  PREVIOUS SURGERIES: Thyroid nodule removed many years ago and endometrial  polyp removed in 2009.  Nasal polyps surgery around april 2012  SOCIAL HISTORY: She is  with three children. She lives in [place].  She does not smoke or drink. 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  FAMILY HISTORY: Two sisters have liver cancer, and another one has breast  cancer. Mother had cancer of the gallbladder. Father had a heart attack.  No diabetes in the family.  PAST MEDICAL HISTORY:  1. 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  2. Alpha globin gene rearrangement test showed that she is an alpha  thalassemia carrier.  3. Chronic anemia.  4-elevated creatinine  5. Hypertension.  6. Status post removal of endometrial polyp.  7. Epistaxis-recurrent felt to be due to HBP.  8. hx of epistaxis Dec 2011 ( nasal polyps)                Review of Systems   Constitutional: Negative.    HENT: Negative.    Eyes: Negative.    Respiratory: Negative.  Negative for cough and wheezing.    Cardiovascular: Negative.  Negative for chest pain.   Gastrointestinal: Negative.    Genitourinary: Negative.    Neurological: Negative.    Psychiatric/Behavioral: Negative.        Objective:      Physical Exam   Constitutional: She is oriented to person, place, and time. She appears well-developed. No distress.   HENT:   Head: Normocephalic.   Right Ear: Tympanic membrane, external ear and ear canal normal.   Left Ear: Tympanic membrane, external ear and ear canal normal.   Nose: Nose normal. Right sinus exhibits no maxillary sinus tenderness and no frontal sinus tenderness. Left sinus exhibits no maxillary sinus tenderness and no frontal sinus tenderness.   Mouth/Throat: Oropharynx is clear and moist and mucous membranes are normal.   Teeth normal.  Gums normal.   Eyes:  Conjunctivae and lids are normal. Pupils are equal, round, and reactive to light.   Neck: Normal carotid pulses, no hepatojugular reflux and no JVD present. Carotid bruit is not present. No tracheal deviation present. No thyroid mass and no thyromegaly present.   Cardiovascular: Normal rate, regular rhythm, S1 normal, S2 normal, normal heart sounds and intact distal pulses.  Exam reveals no gallop and no friction rub.    Carotid exam normal  1-2/6 tarah at the base   Pulmonary/Chest: Effort normal and breath sounds normal. No accessory muscle usage. No respiratory distress. She has no wheezes. She has no rales. She exhibits no tenderness.   Abdominal: Soft. Normal appearance. She exhibits no distension and no mass. There is no splenomegaly or hepatomegaly. There is no tenderness. There is no rebound and no guarding.   Musculoskeletal: Normal range of motion. She exhibits no edema or tenderness.        Right hand: Normal.        Left hand: Normal.       Lymphadenopathy:     She has no cervical adenopathy.     She has no axillary adenopathy.        Right: No inguinal and no supraclavicular adenopathy present.        Left: No inguinal and no supraclavicular adenopathy present.   Neurological: She is alert and oriented to person, place, and time. She has normal strength. No cranial nerve deficit. Coordination normal.   Skin: Skin is warm and dry. No rash noted. She is not diaphoretic. No cyanosis or erythema. No pallor. Nails show no clubbing.   Psychiatric: She has a normal mood and affect. Her behavior is normal. Judgment and thought content normal.       Wt Readings from Last 3 Encounters:   04/20/17 59.5 kg (131 lb 2.8 oz)   04/20/17 59.5 kg (131 lb 2.8 oz)   03/22/17 58.7 kg (129 lb 6.6 oz)     Temp Readings from Last 3 Encounters:   04/20/17 97.4 °F (36.3 °C) (Oral)   03/22/17 97.6 °F (36.4 °C) (Oral)   03/06/17 97.6 °F (36.4 °C) (Oral)     BP Readings from Last 3 Encounters:   04/20/17 110/70   03/22/17 118/66    03/15/17 128/70     Pulse Readings from Last 3 Encounters:   04/20/17 86   03/22/17 76   03/15/17 82       Assessment:       1. Anemia of chronic renal failure, stage 4 (severe)    2. CKD (chronic kidney disease) stage 4, GFR 15-29 ml/min      3-alpha thalassemia carrier  .  Lab Results   Component Value Date    WBC 5.75 04/20/2017    HGB 8.9 (L) 04/20/2017    HCT 26.5 (L) 04/20/2017    MCV 79 (L) 04/20/2017     04/20/2017     Lab Results   Component Value Date    CREATININE 3.9 (H) 12/20/2016    CREATININE 3.9 (H) 12/20/2016     Lab Results   Component Value Date    FERRITIN 225 12/20/2016     Her microcytosis is likely due to her alpha thalassemia carrier status sicne her ferritin is above normal.  She will restart procrit. See me and nurses in 4 weeks with a cbc and a creatinine    Lab Results   Component Value Date    CREATININE 3.9 (H) 12/20/2016    CREATININE 3.9 (H) 12/20/2016       Plan:       As above

## 2017-05-04 ENCOUNTER — INFUSION (OUTPATIENT)
Dept: INFUSION THERAPY | Facility: HOSPITAL | Age: 82
End: 2017-05-04
Attending: INTERNAL MEDICINE
Payer: COMMERCIAL

## 2017-05-04 VITALS — SYSTOLIC BLOOD PRESSURE: 133 MMHG | DIASTOLIC BLOOD PRESSURE: 82 MMHG | TEMPERATURE: 97 F | HEART RATE: 74 BPM

## 2017-05-04 DIAGNOSIS — D63.1 ANEMIA OF CHRONIC RENAL FAILURE, STAGE 3 (MODERATE): Primary | ICD-10-CM

## 2017-05-04 DIAGNOSIS — N18.30 ANEMIA OF CHRONIC RENAL FAILURE, STAGE 3 (MODERATE): Primary | ICD-10-CM

## 2017-05-04 PROCEDURE — 96372 THER/PROPH/DIAG INJ SC/IM: CPT | Mod: PO

## 2017-05-04 PROCEDURE — 63600175 PHARM REV CODE 636 W HCPCS: Mod: PO | Performed by: INTERNAL MEDICINE

## 2017-05-04 RX ADMIN — ERYTHROPOIETIN 20000 UNITS: 20000 INJECTION, SOLUTION INTRAVENOUS; SUBCUTANEOUS at 09:05

## 2017-05-04 NOTE — MR AVS SNAPSHOT
Patient Information     Patient Name Sex Gloria Bruce Female 1931      Visit Information        Provider Department Dep Phone Center    2017 10:00 AM [facility] Chemo Infusion [facility] Chemotherapy Infusion [phone] [facility]      Patient Instructions     None      Your Current Medications Are     albuterol 90 mcg/actuation inhaler    ammonium lactate (AMLACTIN) 12 % lotion    aspirin (ECOTRIN) 81 MG EC tablet    B complex vitamins (B COMPLEX) TbSR    CALCIUM CARBONATE/VITAMIN D3 (CALCIUM 500 + D, D3, ORAL)    cloNIDine (CATAPRES) 0.3 MG tablet    diltiazem (CARDIZEM CD) 180 MG 24 hr capsule    fluticasone (FLONASE) 50 mcg/actuation nasal spray    fluticasone-vilanterol (BREO) 100-25 mcg/dose diskus inhaler    hydrALAZINE (APRESOLINE) 50 MG tablet    ketoconazole (NIZORAL) 2 % shampoo    sodium bicarbonate 650 MG tablet    urea (CARMOL) 40 % Crea      Facility-Administered Medications     epoetin traci injection 20,000 Units      Appointments for Next Year     2017 10:25 AM NON FASTING LAB (5 min.) [facility]    Arrive at check-in approximately 15 minutes before your scheduled appointment time. Bring all outside medical records and imaging, along with a list of your current medications and insurance card.    (off [employer]) 2nd floor    2017 11:00 AM ESTABLISHED PATIENT (20 min.) [facility] - Hemotology Oncology Dutch Napoles Jr., MD    Arrive at check-in approximately 15 minutes before your scheduled appointment time. Bring all outside medical records and imaging, along with a list of your current medications and insurance card.    (off [employer]) 3rd Floor    2017 11:30 AM INFUSION 015 MIN (15 min.) [facility] CHAIR 05 [facility]    Arrive at check-in approximately 15 minutes before your scheduled appointment time. Bring all outside medical records and imaging, along with a list of your current medications and insurance card.     6/23/2017  8:15 AM DIAGNOSTIC XRAY (15 min.) [facility] XR2    Arrive at check-in approximately 15 minutes before your scheduled appointment time. Bring all outside medical records and imaging, along with a list of your current medications and insurance card.    (off [address]) 2nd Floor    6/23/2017  8:40 AM SPIROMETRY (20 min.) [facility] Pulmonary Function [facility] PULMONARY LAB, [facility]    Please do not use any inhalers or respiratory medications on the day of your appointment, however if you feel like you need to use your rescue inhaler for urgent relief prior to your appointment please do so.    (off [address]) 3rd floor    6/23/2017  9:00 AM ESTABLISHED PATIENT (20 min.) [facility] - Pulmonary Services Parth Bennett MD    Arrive at check-in approximately 15 minutes before your scheduled appointment time. Bring all outside medical records and imaging, along with a list of your current medications and insurance card.    (off [address]) 3th floor    3/19/2018  9:10 AM CT CHEST WO CONTRAST (20 min.) [facility] CT1 LIMIT 500 LBS    Arrive at check-in approximately 30 minutes before your scheduled appointment time. Bring all outside medical records and imaging, along with a list of your current medications and insurance card.    (off [address]) 2nd Floor    3/19/2018 11:20 AM ESTABLISHED PATIENT (20 min.) [facility] - Pulmonary Services Parth Bennett MD    Arrive at check-in approximately 15 minutes before your scheduled appointment time. Bring all outside medical records and imaging, along with a list of your current medications and insurance card.    (off [address]) 3th floor         Default Flowsheet Data (last 24 hours)      Amb Complex Vitals Cheo        05/04/17 0950                Measurements    /82        Temp 97 °F (36.1 °C)        Pulse 74        Pain Assessment    Pain Score Zero                Allergies     Allopurinol  Analogues Rash, Other (See Comments)    Sores in mouth    Diflucan [Fluconazole] Swelling    Ace Inhibitors Nausea And Vomiting    Amlodipine Edema    Metronidazole Nausea And Vomiting    Phenytoin Sodium Extended Nausea And Vomiting      Medications You Received from 05/03/2017 1002 to 05/04/2017 1002        Date/Time Order Dose Route Action     05/04/2017 0959 epoetin traci injection 20,000 Units 20,000 Units Subcutaneous Given      Current Discharge Medication List     Cannot display discharge medications since this is not an admission.

## 2017-05-18 ENCOUNTER — OFFICE VISIT (OUTPATIENT)
Dept: HEMATOLOGY/ONCOLOGY | Facility: CLINIC | Age: 82
End: 2017-05-18
Payer: COMMERCIAL

## 2017-05-18 VITALS
OXYGEN SATURATION: 99 % | DIASTOLIC BLOOD PRESSURE: 74 MMHG | HEART RATE: 84 BPM | WEIGHT: 132.06 LBS | RESPIRATION RATE: 18 BRPM | BODY MASS INDEX: 23.4 KG/M2 | TEMPERATURE: 98 F | HEIGHT: 63 IN | SYSTOLIC BLOOD PRESSURE: 146 MMHG

## 2017-05-18 DIAGNOSIS — N18.4 CKD (CHRONIC KIDNEY DISEASE) STAGE 4, GFR 15-29 ML/MIN: Primary | ICD-10-CM

## 2017-05-18 DIAGNOSIS — D63.1 ANEMIA OF CHRONIC RENAL FAILURE, STAGE 4 (SEVERE): ICD-10-CM

## 2017-05-18 DIAGNOSIS — D56.3 ALPHA THALASSEMIA SILENT CARRIER: ICD-10-CM

## 2017-05-18 DIAGNOSIS — N18.4 ANEMIA OF CHRONIC RENAL FAILURE, STAGE 4 (SEVERE): ICD-10-CM

## 2017-05-18 PROCEDURE — 3077F SYST BP >= 140 MM HG: CPT | Mod: S$GLB,,, | Performed by: INTERNAL MEDICINE

## 2017-05-18 PROCEDURE — 1160F RVW MEDS BY RX/DR IN RCRD: CPT | Mod: S$GLB,,, | Performed by: INTERNAL MEDICINE

## 2017-05-18 PROCEDURE — 1159F MED LIST DOCD IN RCRD: CPT | Mod: S$GLB,,, | Performed by: INTERNAL MEDICINE

## 2017-05-18 PROCEDURE — 1126F AMNT PAIN NOTED NONE PRSNT: CPT | Mod: S$GLB,,, | Performed by: INTERNAL MEDICINE

## 2017-05-18 PROCEDURE — 3078F DIAST BP <80 MM HG: CPT | Mod: S$GLB,,, | Performed by: INTERNAL MEDICINE

## 2017-05-18 PROCEDURE — 1157F ADVNC CARE PLAN IN RCRD: CPT | Mod: 8P,S$GLB,, | Performed by: INTERNAL MEDICINE

## 2017-05-18 PROCEDURE — 99999 PR PBB SHADOW E&M-EST. PATIENT-LVL III: CPT | Mod: PBBFAC,,, | Performed by: INTERNAL MEDICINE

## 2017-05-18 PROCEDURE — 99214 OFFICE O/P EST MOD 30 MIN: CPT | Mod: S$GLB,,, | Performed by: INTERNAL MEDICINE

## 2017-05-18 NOTE — MR AVS SNAPSHOT
[facility]  [address] [place] [address]  Phone: [phone]  Fax: [phone]                  Gloria Sanz   2017 11:00 AM   Office Visit    Description:  Female : 1931   Provider:  Dutch Napoles Jr., MD   Department:  [facility]           Diagnoses this Visit        Comments    CKD (chronic kidney disease) stage 4, GFR 15-29 ml/min    -  Primary     Anemia of chronic renal failure, stage 4 (severe)         Alpha thalassemia silent carrier                To Do List           Future Appointments        Provider Department Dept Phone    2017 10:00 AM LABORATORY, [facility] [phone]    2017 10:20 AM Marcus Clark MD [facility] [phone]    2017 10:45 AM [facility] 01 [facility] [phone]    2017 3:00 PM Salvador Mcnair MD [facility] Card[facility] [phone]    2017 8:15 AM [facility] XR2 [facility] [phone]      Goals (5 Years of Data)     None      [facility]     [facility] Nurse Care Line -  Assistance  Unless otherwise directed by your provider, please contact [facility], our nurse care line that is available for  assistance.     Registered nurses in the [facility] provide: appointment scheduling, clinical advisement, health education, and other advisory services.  Call: [phone] (toll free)               Medications           Message regarding Medications     Verify the changes and/or additions to your medication regime listed below are the same as discussed with your clinician today.  If any of these changes or additions are incorrect, please notify your healthcare provider.             Verify that the below list of medications is an accurate representation of the medications you are currently taking.  If none reported, the list may be blank. If incorrect, please contact your healthcare  "provider. Carry this list with you in case of emergency.           Current Medications     albuterol 90 mcg/actuation inhaler Inhale 2 puffs into the lungs every 6 (six) hours as needed for Wheezing.    ammonium lactate (AMLACTIN) 12 % lotion Use daily.  Apply to damp skin after bathing.    aspirin (ECOTRIN) 81 MG EC tablet Take 81 mg by mouth once daily.    B complex vitamins (B COMPLEX) TbSR Take by mouth. 1 Tablet Extended Release Oral Every day    CALCIUM CARBONATE/VITAMIN D3 (CALCIUM 500 + D, D3, ORAL) Take by mouth. 1 Tablet Oral Twice a day    cloNIDine (CATAPRES) 0.3 MG tablet take 1 tablet by mouth twice a day    diltiazem (CARDIZEM CD) 180 MG 24 hr capsule Take 1 capsule (180 mg total) by mouth once daily.    fluticasone (FLONASE) 50 mcg/actuation nasal spray 2 sprays by Each Nare route daily as needed.    fluticasone-vilanterol (BREO) 100-25 mcg/dose diskus inhaler Inhale 1 puff into the lungs once daily.    hydrALAZINE (APRESOLINE) 50 MG tablet Take 1 tablet (50 mg total) by mouth 3 (three) times daily.    ketoconazole (NIZORAL) 2 % shampoo Wash hair with medicated shampoo at least 2x/week - let sit on scalp at least 5 minutes prior to rinsing    sodium bicarbonate 650 MG tablet Take 1 tablet (650 mg total) by mouth 3 (three) times daily.    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 3 nights/week           Clinical Reference Information           Your Vitals Were     BP Pulse Temp Resp Height Weight    146/74 84 97.7 °F (36.5 °C) (Oral) 18 5' 2.8" (1.595 m) 59.9 kg (132 lb 0.9 oz)    SpO2 BMI             99% 23.54 kg/m2         Blood Pressure          Most Recent Value    BP  (!)  146/74      Allergies as of 5/18/2017     Allopurinol Analogues    Diflucan [Fluconazole]    Ace Inhibitors    Amlodipine    Metronidazole    Phenytoin Sodium Extended      Immunizations Administered on Date of Encounter - 5/18/2017     None      Orders Placed During Today's Visit     Future Labs/Procedures Expected by Expires    " CBC auto differential  5/18/2017 7/17/2018      Language Assistance Services     ATTENTION: Language assistance services are available, free of charge. Please call [phone].      ATENCIÓN: Si habla daisy, tiene a sargent disposición servicios gratuitos de asistencia lingüística. Llame al [phone].     CHÚ Ý: N?u b?n nói Ti?ng Vi?t, có các d?ch v? h? tr? ngôn ng? mi?n phí dành cho b?n. G?i s? [phone].         [facility] complies with applicable Federal civil rights laws and does not discriminate on the basis of race, color, national origin, age, disability, or sex.

## 2017-05-22 RX ORDER — CLONIDINE HYDROCHLORIDE 0.3 MG/1
TABLET ORAL
Qty: 60 TABLET | Refills: 3 | Status: SHIPPED | OUTPATIENT
Start: 2017-05-22 | End: 2017-09-15 | Stop reason: SDUPTHER

## 2017-06-02 ENCOUNTER — LAB VISIT (OUTPATIENT)
Dept: LAB | Facility: HOSPITAL | Age: 82
End: 2017-06-02
Attending: INTERNAL MEDICINE
Payer: COMMERCIAL

## 2017-06-02 ENCOUNTER — OFFICE VISIT (OUTPATIENT)
Dept: HEMATOLOGY/ONCOLOGY | Facility: CLINIC | Age: 82
End: 2017-06-02
Payer: COMMERCIAL

## 2017-06-02 VITALS
HEIGHT: 62 IN | WEIGHT: 132.06 LBS | OXYGEN SATURATION: 100 % | RESPIRATION RATE: 16 BRPM | BODY MASS INDEX: 24.3 KG/M2 | HEART RATE: 89 BPM | DIASTOLIC BLOOD PRESSURE: 82 MMHG | TEMPERATURE: 96 F | SYSTOLIC BLOOD PRESSURE: 142 MMHG

## 2017-06-02 DIAGNOSIS — N18.4 ANEMIA OF CHRONIC RENAL FAILURE, STAGE 4 (SEVERE): Primary | ICD-10-CM

## 2017-06-02 DIAGNOSIS — N18.4 CKD (CHRONIC KIDNEY DISEASE) STAGE 4, GFR 15-29 ML/MIN: ICD-10-CM

## 2017-06-02 DIAGNOSIS — D63.1 ANEMIA OF CHRONIC RENAL FAILURE, STAGE 4 (SEVERE): Primary | ICD-10-CM

## 2017-06-02 LAB
BASOPHILS # BLD AUTO: 0.01 K/UL
BASOPHILS NFR BLD: 0.2 %
DIFFERENTIAL METHOD: ABNORMAL
EOSINOPHIL # BLD AUTO: 0.1 K/UL
EOSINOPHIL NFR BLD: 1.8 %
ERYTHROCYTE [DISTWIDTH] IN BLOOD BY AUTOMATED COUNT: 15.8 %
HCT VFR BLD AUTO: 32.5 %
HGB BLD-MCNC: 10.8 G/DL
LYMPHOCYTES # BLD AUTO: 2.2 K/UL
LYMPHOCYTES NFR BLD: 43.7 %
MCH RBC QN AUTO: 26.4 PG
MCHC RBC AUTO-ENTMCNC: 33.2 %
MCV RBC AUTO: 80 FL
MONOCYTES # BLD AUTO: 0.6 K/UL
MONOCYTES NFR BLD: 10.9 %
NEUTROPHILS # BLD AUTO: 2.2 K/UL
NEUTROPHILS NFR BLD: 43.4 %
PLATELET # BLD AUTO: 171 K/UL
PMV BLD AUTO: 9.8 FL
RBC # BLD AUTO: 4.09 M/UL
WBC # BLD AUTO: 5.03 K/UL

## 2017-06-02 PROCEDURE — 1126F AMNT PAIN NOTED NONE PRSNT: CPT | Mod: S$GLB,,, | Performed by: INTERNAL MEDICINE

## 2017-06-02 PROCEDURE — 85025 COMPLETE CBC W/AUTO DIFF WBC: CPT | Mod: PO

## 2017-06-02 PROCEDURE — 1159F MED LIST DOCD IN RCRD: CPT | Mod: S$GLB,,, | Performed by: INTERNAL MEDICINE

## 2017-06-02 PROCEDURE — 99999 PR PBB SHADOW E&M-EST. PATIENT-LVL IV: CPT | Mod: PBBFAC,,, | Performed by: INTERNAL MEDICINE

## 2017-06-02 PROCEDURE — 36415 COLL VENOUS BLD VENIPUNCTURE: CPT | Mod: PO

## 2017-06-02 PROCEDURE — 99214 OFFICE O/P EST MOD 30 MIN: CPT | Mod: S$GLB,,, | Performed by: INTERNAL MEDICINE

## 2017-06-02 NOTE — PROGRESS NOTES
Subjective:       Patient ID: Gloria Sanz is a 85 y.o. female.    Chief Complaint: Anemia and Results (labs)    This 84 year old AA lady comes for follow uo her anemia associated to chronic renal failure.  She is known to us because in MAy 2010 had a cbc that showed a hemoglobin of 10.6 with an MCV of 79.4. White cell count was 4,380 (differential included 17% monocytes) and a platelet count of 217,000 Review of information in the computer indicated that the patient has had mild anemia dating back a number of years\.    In 1991, her hemoglobin was 11.7 with an MCV of 84. Her MCV through the years has been anywhere from 79-84. The patient has had several iron studies that showed an elevated ferritin, a creatinine of 1.7 as well as serum protein electrophoresis that has failed to show a monoclonal spike.    . B12 levels have been normal.    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    The patient comes today for routine follow up.    At the end dec 2011 she had a severe epistaxis requiring a blood transfusion. She ended up having surgery for nasal polyps In May 2012 she ha had a drop of hr HGB to 8.8 and her creatinine was 1.8 She slowly improved ion her own. SPEp showed a polyclonal gammopathy and her free light chains showed elevations of both the kappa and lambda light chains which goes against clonality.       She had the zoster vaccine in 2008    She is on procrit every 2 weeks when needed    She is know to have a sub-centimeter pulmonary nodule in the right middle lobe, Ct scan in December 2015 showed stability.A CT scan done 3/1027 showed a new nodule, measuring 4.6 mm in the right lung.  She is scheduled to have it repeated in a year ng A multinodular thyroid was found and an US was suggested.The Us showed the same.       She has recurrent epistaxis which is felt  to be due to HBP    She comes for follow up   ALLERGIES: See med card  MEDICATIONS: See MedCard.  PREVIOUS SURGERIES: Thyroid nodule removed many years ago and endometrial  polyp removed in 2009.  Nasal polyps surgery around april 2012  SOCIAL HISTORY: She is  with three children. She lives in [place].  She does not smoke or drink. 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  FAMILY HISTORY: Two sisters have liver cancer, and another one has breast  cancer. Mother had cancer of the gallbladder. Father had a heart attack.  No diabetes in the family.  PAST MEDICAL HISTORY:  1. 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  2. Alpha globin gene rearrangement test showed that she is an alpha  thalassemia carrier.  3. Chronic anemia.  4-elevated creatinine  5. Hypertension.  6. Status post removal of endometrial polyp.  7. Epistaxis-recurrent felt to be due to HBP.  8. hx of epistaxis Dec 2011 ( nasal polyps)              HPI  Review of Systems   Constitutional: Negative.  Negative for appetite change and unexpected weight change.   HENT: Negative.    Eyes: Negative.  Negative for visual disturbance.   Respiratory: Negative.  Negative for cough, shortness of breath and wheezing.    Cardiovascular: Negative.  Negative for chest pain.   Gastrointestinal: Negative.  Negative for abdominal pain and diarrhea.   Genitourinary: Negative.  Negative for frequency.   Musculoskeletal: Negative for back pain.   Skin: Negative for rash.   Neurological: Negative.  Negative for headaches.   Hematological: Negative for adenopathy.   Psychiatric/Behavioral: Negative.  The patient is not nervous/anxious.        Objective:      Physical Exam   Constitutional: She is oriented to person, place, and time. She appears well-developed. No distress.   HENT:   Head: Normocephalic.   Right Ear: Tympanic membrane, external ear and ear canal normal.   Left Ear: Tympanic membrane, external  ear and ear canal normal.   Nose: Nose normal. Right sinus exhibits no maxillary sinus tenderness and no frontal sinus tenderness. Left sinus exhibits no maxillary sinus tenderness and no frontal sinus tenderness.   Mouth/Throat: Oropharynx is clear and moist and mucous membranes are normal.   Teeth normal.  Gums normal.   Eyes: Conjunctivae and lids are normal. Pupils are equal, round, and reactive to light.   Neck: Normal carotid pulses, no hepatojugular reflux and no JVD present. Carotid bruit is not present. No tracheal deviation present. No thyroid mass and no thyromegaly present.   Cardiovascular: Normal rate, regular rhythm, S1 normal, S2 normal, normal heart sounds and intact distal pulses.  Exam reveals no gallop and no friction rub.    No murmur heard.  Carotid exam normal   Pulmonary/Chest: Effort normal and breath sounds normal. No accessory muscle usage. No respiratory distress. She has no wheezes. She has no rales. She exhibits no tenderness.   Abdominal: Soft. Normal appearance. She exhibits no distension and no mass. There is no splenomegaly or hepatomegaly. There is no tenderness. There is no rebound and no guarding.   Musculoskeletal: Normal range of motion. She exhibits no edema or tenderness.        Right hand: Normal.        Left hand: Normal.       Lymphadenopathy:     She has no cervical adenopathy.     She has no axillary adenopathy.        Right: No inguinal and no supraclavicular adenopathy present.        Left: No inguinal and no supraclavicular adenopathy present.   Neurological: She is alert and oriented to person, place, and time. She has normal strength. No cranial nerve deficit. Coordination normal.   Skin: Skin is warm and dry. No rash noted. She is not diaphoretic. No cyanosis or erythema. No pallor. Nails show no clubbing.   Psychiatric: She has a normal mood and affect. Her behavior is normal. Judgment and thought content normal.       Wt Readings from Last 3 Encounters:    06/02/17 59.9 kg (132 lb 0.9 oz)   05/18/17 59.9 kg (132 lb 0.9 oz)   04/20/17 59.5 kg (131 lb 2.8 oz)     Temp Readings from Last 3 Encounters:   06/02/17 96 °F (35.6 °C)   05/18/17 97.7 °F (36.5 °C) (Oral)   05/04/17 97 °F (36.1 °C)     BP Readings from Last 3 Encounters:   06/02/17 (!) 142/82   05/18/17 (!) 146/74   05/04/17 133/82     Pulse Readings from Last 3 Encounters:   06/02/17 89   05/18/17 84   05/04/17 74       Assessment:       1. Anemia of chronic renal failure, stage 4 (severe)        Plan:       Lab Results   Component Value Date    WBC 5.03 06/02/2017    HGB 10.8 (L) 06/02/2017    HCT 32.5 (L) 06/02/2017    MCV 80 (L) 06/02/2017     06/02/2017     Lab Results   Component Value Date    CREATININE 4.0 (H) 05/18/2017       HGB above 10, so she will not require procrit.  Follow up with nephrology regarding her CRF  See me in 4 weeks with a cbc and a nurses appointment for probable Procrit.  She asked for a referral to Podiatry

## 2017-06-06 ENCOUNTER — OFFICE VISIT (OUTPATIENT)
Dept: PODIATRY | Facility: CLINIC | Age: 82
End: 2017-06-06
Payer: COMMERCIAL

## 2017-06-06 VITALS
SYSTOLIC BLOOD PRESSURE: 132 MMHG | BODY MASS INDEX: 24.09 KG/M2 | HEIGHT: 62 IN | HEART RATE: 72 BPM | WEIGHT: 130.94 LBS | DIASTOLIC BLOOD PRESSURE: 79 MMHG

## 2017-06-06 DIAGNOSIS — G60.9 IDIOPATHIC PERIPHERAL NEUROPATHY: ICD-10-CM

## 2017-06-06 DIAGNOSIS — M20.12 HALLUX VALGUS WITH BUNIONS OF LEFT FOOT: ICD-10-CM

## 2017-06-06 DIAGNOSIS — M20.41 HAMMER TOES OF BOTH FEET: ICD-10-CM

## 2017-06-06 DIAGNOSIS — M20.42 HAMMER TOES OF BOTH FEET: ICD-10-CM

## 2017-06-06 DIAGNOSIS — M21.612 HALLUX VALGUS WITH BUNIONS OF LEFT FOOT: ICD-10-CM

## 2017-06-06 DIAGNOSIS — M21.611 HALLUX VALGUS WITH BUNIONS, RIGHT: ICD-10-CM

## 2017-06-06 DIAGNOSIS — L84 CORN OR CALLUS: ICD-10-CM

## 2017-06-06 DIAGNOSIS — M20.11 HALLUX VALGUS WITH BUNIONS, RIGHT: ICD-10-CM

## 2017-06-06 DIAGNOSIS — N18.4 CKD (CHRONIC KIDNEY DISEASE) STAGE 4, GFR 15-29 ML/MIN: ICD-10-CM

## 2017-06-06 DIAGNOSIS — B35.1 DERMATOPHYTOSIS OF NAIL: Primary | ICD-10-CM

## 2017-06-06 PROCEDURE — 11055 PARING/CUTG B9 HYPRKER LES 1: CPT | Mod: Q9,S$GLB,, | Performed by: PODIATRIST

## 2017-06-06 PROCEDURE — 99999 PR PBB SHADOW E&M-EST. PATIENT-LVL III: CPT | Mod: PBBFAC,,, | Performed by: PODIATRIST

## 2017-06-06 PROCEDURE — 99243 OFF/OP CNSLTJ NEW/EST LOW 30: CPT | Mod: 25,S$GLB,, | Performed by: PODIATRIST

## 2017-06-06 PROCEDURE — 11721 DEBRIDE NAIL 6 OR MORE: CPT | Mod: 59,Q9,S$GLB, | Performed by: PODIATRIST

## 2017-06-06 NOTE — PROGRESS NOTES
[facility] -   PODIATRIC MEDICINE AND SURGERY  PROGRESS NOTE     NOTE TYPE:  Consutl  Requesting Consult Provider: Dr. Clark       CHIEF COMPLAINT  Chief Complaint   Patient presents with    Foot Problem     burning in right bunion, bilateral toe dryness and coldness         HPI    SUBJECTIVE: Gloria Sanz is a 85 y.o. female who  has a past medical history of Anemia; Anemia of chronic renal failure (5/12/2014); Asthma, chronic; Cataract; GERD (gastroesophageal reflux disease); Gout, arthritis; Helicobacter pylori gastritis; Hypertension; Osteoarthritis; Pulmonary HTN; and Renal insufficiency. Gloriapresents to clinic for high risk  foot exam and care.  Gloria admits numbness, burning, and/or tingling sensations in their feet. Patient admits to painful toenails aggravated by increased weight bearing, shoe gear, and pressure. States pain is relieved with routine debridements.    She also complains about bunions and hammertoes. She has not had any treatment. Patient has no other pedal complaints at this time.    HgA1c:   Hemoglobin A1C   Date Value Ref Range Status   02/10/2011 5.4 4.0 - 6.2 % Final         PMH  Past Medical History:   Diagnosis Date    Anemia     sickle cell trait, alpha thalessemia    Anemia of chronic renal failure 5/12/2014    Asthma, chronic     Cataract     GERD (gastroesophageal reflux disease)     Gout, arthritis     Helicobacter pylori gastritis     Hypertension     Osteoarthritis     Pulmonary HTN     Renal insufficiency      Patient Active Problem List   Diagnosis    Hypertension    Helicobacter pylori gastritis    Osteoporosis, senile    HTN (hypertension)    Arthritis of knee    Acquired valgus deformity knee    Alpha thalassemia silent carrier    Sickle cell trait    Anemia of chronic renal failure    Proteinuria    Acute conjunctivitis of right eye    Kidney cysts    Epistaxis, recurrent    Diverticulosis    Lung nodule seen on  imaging study    Gallstones    Rash    Sinusitis    Dehydration    TIA (transient ischemic attack)    Hyperkalemia    Moderate persistent asthma without complication    Pulmonary nodule seen on imaging study    Malignant hypertension    Abnormal ECG    Diminished pulses in lower extremity    CKD (chronic kidney disease) stage 4, GFR 15-29 ml/min    Overactive bladder    Mild persistent asthma without complication    Dysphagia       MEDS  Current Outpatient Prescriptions on File Prior to Visit   Medication Sig Dispense Refill    albuterol 90 mcg/actuation inhaler Inhale 2 puffs into the lungs every 6 (six) hours as needed for Wheezing. 1 each 11    ammonium lactate (AMLACTIN) 12 % lotion Use daily.  Apply to damp skin after bathing. 225 g 11    aspirin (ECOTRIN) 81 MG EC tablet Take 81 mg by mouth once daily.      B complex vitamins (B COMPLEX) TbSR Take by mouth. 1 Tablet Extended Release Oral Every day      CALCIUM CARBONATE/VITAMIN D3 (CALCIUM 500 + D, D3, ORAL) Take by mouth. 1 Tablet Oral Twice a day      cloNIDine (CATAPRES) 0.3 MG tablet take 1 tablet by mouth twice a day 60 tablet 3    diltiazem (CARDIZEM CD) 180 MG 24 hr capsule Take 1 capsule (180 mg total) by mouth once daily. 30 capsule 11    fluticasone (FLONASE) 50 mcg/actuation nasal spray 2 sprays by Each Nare route daily as needed.  0    fluticasone-vilanterol (BREO) 100-25 mcg/dose diskus inhaler Inhale 1 puff into the lungs once daily. 60 each 11    hydrALAZINE (APRESOLINE) 50 MG tablet Take 1 tablet (50 mg total) by mouth 3 (three) times daily. 90 tablet 11    ketoconazole (NIZORAL) 2 % shampoo Wash hair with medicated shampoo at least 2x/week - let sit on scalp at least 5 minutes prior to rinsing 120 mL 5    sodium bicarbonate 650 MG tablet Take 1 tablet (650 mg total) by mouth 3 (three) times daily. 90 tablet 11    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 3 nights/week 198.6 g 3     No current facility-administered  "medications on file prior to visit.        PSH     Past Surgical History:   Procedure Laterality Date    CHOLECYSTECTOMY      endometrial polyp      FETAL BLOOD TRANSFUSION  12/11/2015    2 pints    THYROID SURGERY          ALL  Review of patient's allergies indicates:   Allergen Reactions    Allopurinol analogues Rash and Other (See Comments)     Sores in mouth    Diflucan [fluconazole] Swelling    Ace inhibitors Nausea And Vomiting    Amlodipine Edema    Metronidazole Nausea And Vomiting    Phenytoin sodium extended Nausea And Vomiting       SOC     Social History   Substance Use Topics    Smoking status: Never Smoker    Smokeless tobacco: Never Used    Alcohol use No         Family HX  Family History   Problem Relation Age of Onset    Liver cancer Mother     Cancer Mother     Liver cancer Sister     Breast cancer Sister     Cancer Sister     Glaucoma Sister     Thyroid disease Sister     Melanoma Neg Hx     Eczema Neg Hx     Lupus Neg Hx     Psoriasis Neg Hx        PHYSICAL EXAM  Vitals:    06/06/17 0853   BP: 132/79   Pulse: 72   Weight: 59.4 kg (130 lb 15.3 oz)   Height: 5' 2" (1.575 m)      REVIEW OF SYSTEMS  Review of Systems   Constitutional: Negative for chills and fever.   Musculoskeletal: Positive for joint pain.   Skin: Positive for itching.   Neurological: Negative for dizziness and tingling.       GEN:  This patient is well-developed, well-nourished and appears stated age, well-oriented to person, place and time, and cooperative and pleasant on today's visit.      LOWER EXTREMITY  Vascular:   · DP pedal pulse 2/4 b/l, PT pedal pulse 2/4 b/l  · Skin temperature warm to warm from prox to distally  · CFT <5 secs b/l  · There is no edema noted b/l.     Dermatologic:   · Thickened, dystrophic, elongated toenails with subungal debris 1-5 b/l.   · No open skin lesions noted  · No erythema or drainage noted b/l.  · Webspaces are C/D/I B/L.  · There is hyperkeratotic tissue noted dorsum " second digit RIGHT foot.  · Skin texture and turgor   · There is no pedal hair growth noted    Neurologic:  · Protective sensation absent at 0/10 sites upon examination with [place] Weinsten 5.07 g monofilament.   · Propioception intact at 1st MTPJ b/l.   · Babinski reflex absent b/l. Light touch and sharp/dull sensation intact b/l.    Musculoskeletal/Orthopedic:  · No symptomatic structural abnormalities noted.   · Bilateral HAV deformity with prominent medial eminence 1st metatarsal  · There are semi rigid hammertoe deformities 2-5 b/l  · Muscle strength is 5/5 for foot inverters, everters, plantarflexors, and dorsiflexors. Muscle tone is normal.  · Pain free range of motion in all four quadrants with stiffness and limitation b/l  · Foot type: pronated with decreased medial longituidinal arch         ASSESSMENT  Encounter Diagnoses   Name Primary?    CKD (chronic kidney disease) stage 4, GFR 15-29 ml/min     Dermatophytosis of nail Yes    Corn or callus     Hallux valgus with bunions, right     Hallux valgus with bunions of left foot     Hammer toes of both feet     Idiopathic peripheral neuropathy        PLAN  -patient was examined and evaluated    Dermatophytosis of nail    CKD (chronic kidney disease) stage 4, GFR 15-29 ml/min  -     DIABETIC SHOES FOR HOME USE    Corn or callus  -     DIABETIC SHOES FOR HOME USE    Hallux valgus with bunions, right  -     DIABETIC SHOES FOR HOME USE    Hallux valgus with bunions of left foot  -     DIABETIC SHOES FOR HOME USE    Hammer toes of both feet  -     DIABETIC SHOES FOR HOME USE    Idiopathic peripheral neuropathy  -     DIABETIC SHOES FOR HOME USE      -Discuss presenting problems, etiology, pathologic processes and management options with patient today.   -I counseled the patient on their conditions, their implications and medical management. An in depth discussion on diabetic management, risk prevention, amputation prevention verbally and provided educational  literature in written format.  -With patient's permission, nails were aggressively reduced and debrided x 10 to their soft tissue attachment mechanically and with electric , removing all offending nail and debris. Patient relates relief following the procedure. Patient will continue to monitor the areas daily, inspect feet, wear protective shoe gear when ambulatory, moisturizer to maintain skin integrity.  -sharp excisional debridement of hyperkeratotic lesions deep to epidermal layer x 1 was performed with a #15 blade without incident.  - Shoe inspection. RX DM shoes 2/2 to CKD Stage 4, neurological manifestations    Future Appointments  Date Time Provider Department Center   6/6/2017 1:00 PM ECHOCARDIOGRAM, [facility] SPECARD [facility]   6/20/2017 3:00 PM Salvador Mcnair MD [facility] CARDIO [facility]   6/23/2017 8:15 AM [facility] XR2 [facility] XRAY [facility]   6/23/2017 8:40 AM PULMONARY LAB, [facility] PULMLAB [facility]   6/23/2017 9:00 AM Parth Bennett MD [facility] PULMSVC [facility]   6/30/2017 1:00 PM LABORATORY, [facility] LAB [facility]   6/30/2017 1:20 PM Marcus Clark MD [facility] HEM ONC [facility]   6/30/2017 1:45 PM CHAIR 06 [facility] CHEMO [facility]   9/7/2017 9:20 AM Gi Rivera DPM [facility] POD [facility]   3/19/2018 9:10 AM [facility] CT1 LIMIT 500 LBS [facility] CT SCAN [facility]   3/19/2018 11:20 AM Parth Bennett MD [facility] PULMSVC [facility]

## 2017-06-06 NOTE — LETTER
June 6, 2017      Marcus Clark MD  [address] [facility] [address] [place] [address]           [facility] - Podiatry  [address]  [place] [address]  Phone: [phone]  Fax: [phone]          Patient: Gloria Sanz   MR Number: [id number]   YOB: 1931   Date of Visit: 6/6/2017       Dear Dr. Marcus Clark:    Thank you for referring Gloria Sanz to me for evaluation. Attached you will find relevant portions of my assessment and plan of care.    If you have questions, please do not hesitate to call me. I look forward to following Gloria Sanz along with you.    Sincerely,    Gi Rivera, OMAR    Enclosure  CC:  No Recipients    If you would like to receive this communication electronically, please contact [email or web address] or [phone] to request more information on [employer] Link access.    For providers and/or their staff who would like to refer a patient to [facility], please contact us through our one-stop-shop provider referral line, [facility], at [phone].    If you feel you have received this communication in error or would no longer like to receive these types of communications, please e-mail [email or web address]

## 2017-06-15 ENCOUNTER — TELEPHONE (OUTPATIENT)
Dept: CARDIOLOGY | Facility: CLINIC | Age: 82
End: 2017-06-15

## 2017-06-15 NOTE — TELEPHONE ENCOUNTER
Spoke to pt regarding the echocardiogram that she missed.  Explained that she needed to have it done prior to seeing Dr Mcnair on 6/20/17.  Gave her the next available.  She verbalized understanding of date, time and location with no further questions at this time.

## 2017-06-16 ENCOUNTER — CLINICAL SUPPORT (OUTPATIENT)
Dept: CARDIOLOGY | Facility: CLINIC | Age: 82
End: 2017-06-16
Payer: COMMERCIAL

## 2017-06-16 DIAGNOSIS — R94.31 ABNORMAL EKG: ICD-10-CM

## 2017-06-16 DIAGNOSIS — I10 ESSENTIAL HYPERTENSION: ICD-10-CM

## 2017-06-16 LAB
AORTIC VALVE STENOSIS: ABNORMAL
ESTIMATED PA SYSTOLIC PRESSURE: 30.04
MITRAL VALVE REGURGITATION: ABNORMAL
RETIRED EF AND QEF - SEE NOTES: 60 (ref 55–65)
TRICUSPID VALVE REGURGITATION: ABNORMAL

## 2017-06-16 PROCEDURE — 93306 TTE W/DOPPLER COMPLETE: CPT | Mod: S$GLB,,, | Performed by: INTERNAL MEDICINE

## 2017-06-20 ENCOUNTER — OFFICE VISIT (OUTPATIENT)
Dept: CARDIOLOGY | Facility: CLINIC | Age: 82
End: 2017-06-20
Payer: COMMERCIAL

## 2017-06-20 VITALS
HEIGHT: 62 IN | DIASTOLIC BLOOD PRESSURE: 68 MMHG | SYSTOLIC BLOOD PRESSURE: 136 MMHG | BODY MASS INDEX: 24.83 KG/M2 | WEIGHT: 134.94 LBS | HEART RATE: 56 BPM

## 2017-06-20 DIAGNOSIS — N18.4 CKD (CHRONIC KIDNEY DISEASE) STAGE 4, GFR 15-29 ML/MIN: ICD-10-CM

## 2017-06-20 DIAGNOSIS — I36.1 NON-RHEUMATIC TRICUSPID VALVE INSUFFICIENCY: ICD-10-CM

## 2017-06-20 DIAGNOSIS — I10 ESSENTIAL HYPERTENSION: Primary | ICD-10-CM

## 2017-06-20 DIAGNOSIS — I27.20 PULMONARY HYPERTENSION: ICD-10-CM

## 2017-06-20 DIAGNOSIS — I51.7 LVH (LEFT VENTRICULAR HYPERTROPHY): ICD-10-CM

## 2017-06-20 DIAGNOSIS — I35.0 NONRHEUMATIC AORTIC VALVE STENOSIS: ICD-10-CM

## 2017-06-20 PROCEDURE — 99213 OFFICE O/P EST LOW 20 MIN: CPT | Mod: S$GLB,,, | Performed by: INTERNAL MEDICINE

## 2017-06-20 PROCEDURE — 99999 PR PBB SHADOW E&M-EST. PATIENT-LVL III: CPT | Mod: PBBFAC,,, | Performed by: INTERNAL MEDICINE

## 2017-06-20 PROCEDURE — 1159F MED LIST DOCD IN RCRD: CPT | Mod: S$GLB,,, | Performed by: INTERNAL MEDICINE

## 2017-06-20 NOTE — PROGRESS NOTES
"Subjective:    Patient ID:  Gloria Sanz is a 85 y.o. female who presents for evaluation of Hypertension and Valvular Heart Disease      HPI Mrs. Sanz presents for f/u.  Her current medical conditions include HTN, AS, LVH, DD, TR, PHTN, CRI, dementia.  Negative stress mpi 2016.  She seems ok.   Has some chronic intermittent MENJIVAR, stable.  No cp.  No dizziness or syncope.  BP controlled on current regimen.  Echo 6/17 shows normal EF, DD, stable mild AS, PAP normal, mod TR.    Review of Systems   Constitution: Negative.   HENT: Negative.    Eyes: Negative.    Cardiovascular: Negative.    Respiratory: Positive for shortness of breath.    Endocrine: Negative.    Hematologic/Lymphatic: Negative.    Skin: Negative.    Musculoskeletal: Negative.    Gastrointestinal: Negative.    Genitourinary: Negative.    Neurological: Negative.    Psychiatric/Behavioral: Negative.    Allergic/Immunologic: Negative.        /68 (BP Location: Left arm, Patient Position: Sitting)   Pulse (!) 56   Ht 5' 2" (1.575 m)   Wt 61.2 kg (134 lb 14.7 oz)   BMI 24.68 kg/m²     Wt Readings from Last 3 Encounters:   06/20/17 61.2 kg (134 lb 14.7 oz)   06/06/17 59.4 kg (130 lb 15.3 oz)   06/02/17 59.9 kg (132 lb 0.9 oz)     Temp Readings from Last 3 Encounters:   06/02/17 96 °F (35.6 °C)   05/18/17 97.7 °F (36.5 °C) (Oral)   05/04/17 97 °F (36.1 °C)     BP Readings from Last 3 Encounters:   06/20/17 136/68   06/06/17 132/79   06/02/17 (!) 142/82     Pulse Readings from Last 3 Encounters:   06/20/17 (!) 56   06/06/17 72   06/02/17 89          Objective:    Physical Exam   Constitutional: Vital signs are normal. She appears well-developed and well-nourished. She is active and cooperative. She does not have a sickly appearance. She does not appear ill. No distress.   HENT:   Head: Normocephalic.   Neck: Neck supple. No JVD present. Carotid bruit is not present.   Cardiovascular: Normal rate, regular rhythm, S1 normal, S2 normal and normal " pulses.  PMI is not displaced.  Exam reveals no gallop and no friction rub.    Murmur heard.   Medium-pitched midsystolic murmur is present with a grade of 1/6  at the upper left sternal border  Pulses:       Radial pulses are 2+ on the right side, and 2+ on the left side.   Pulmonary/Chest: Effort normal and breath sounds normal. She has no wheezes. She has no rales.   Abdominal: Soft. Normal appearance, normal aorta and bowel sounds are normal. She exhibits no mass. There is no tenderness.   Musculoskeletal: She exhibits edema (ankle edema B LE).   Lymphadenopathy:     She has no cervical adenopathy.   Neurological: She is alert.   Skin: Skin is warm. She is not diaphoretic.   Psychiatric: She has a normal mood and affect. Her behavior is normal.   Nursing note and vitals reviewed.      I have reviewed all pertinent labs and cardiac studies.      Chemistry        Component Value Date/Time     12/20/2016 1029     12/20/2016 1029    K 5.0 12/20/2016 1029    K 5.0 12/20/2016 1029     12/20/2016 1029     12/20/2016 1029    CO2 19 (L) 12/20/2016 1029    CO2 19 (L) 12/20/2016 1029    BUN 65 (H) 12/20/2016 1029    BUN 65 (H) 12/20/2016 1029    CREATININE 4.0 (H) 05/18/2017 0935    GLU 98 12/20/2016 1029    GLU 98 12/20/2016 1029        Component Value Date/Time    CALCIUM 9.7 12/20/2016 1029    CALCIUM 9.7 12/20/2016 1029    ALKPHOS 93 04/11/2016 1234    AST 28 04/11/2016 1234    ALT 14 04/11/2016 1234    BILITOT 0.3 04/11/2016 1234        Lab Results   Component Value Date    WBC 5.03 06/02/2017    HGB 10.8 (L) 06/02/2017    HCT 32.5 (L) 06/02/2017    MCV 80 (L) 06/02/2017     06/02/2017     Lab Results   Component Value Date    CHOL 133 04/14/2014    CHOL 138 07/12/2013    CHOL 136 01/16/2012     Lab Results   Component Value Date    HDL 42 04/14/2014    HDL 39 (L) 07/12/2013    HDL 39 (L) 01/16/2012     Lab Results   Component Value Date    LDLCALC 79.2 04/14/2014    LDLCALC 81.6  07/12/2013    LDLCALC 82.0 01/16/2012     Lab Results   Component Value Date    TRIG 59 04/14/2014    TRIG 87 07/12/2013    TRIG 73 01/16/2012     Lab Results   Component Value Date    CHOLHDL 31.6 04/14/2014    CHOLHDL 28.3 07/12/2013    CHOLHDL 28.7 01/16/2012     Lab Results   Component Value Date    HGBA1C 5.4 02/10/2011           Assessment:       Stable CV conditions on current medical tx.    1. Essential hypertension    2. Nonrheumatic aortic valve stenosis    3. LVH (left ventricular hypertrophy)    4. Pulmonary hypertension    5. Non-rheumatic tricuspid valve insufficiency    6. CKD (chronic kidney disease) stage 4, GFR 15-29 ml/min         Plan:             Continue current tx.  Cardiac low salt diet.    F/u 1 year.

## 2017-06-23 ENCOUNTER — HOSPITAL ENCOUNTER (OUTPATIENT)
Dept: RADIOLOGY | Facility: HOSPITAL | Age: 82
Discharge: HOME | End: 2017-06-23
Attending: NURSE PRACTITIONER
Payer: COMMERCIAL

## 2017-06-23 ENCOUNTER — OFFICE VISIT (OUTPATIENT)
Dept: PULMONOLOGY | Facility: CLINIC | Age: 82
End: 2017-06-23
Payer: COMMERCIAL

## 2017-06-23 ENCOUNTER — PROCEDURE VISIT (OUTPATIENT)
Dept: PULMONOLOGY | Facility: CLINIC | Age: 82
End: 2017-06-23
Payer: COMMERCIAL

## 2017-06-23 VITALS
HEART RATE: 73 BPM | SYSTOLIC BLOOD PRESSURE: 118 MMHG | HEIGHT: 62 IN | RESPIRATION RATE: 16 BRPM | DIASTOLIC BLOOD PRESSURE: 78 MMHG | WEIGHT: 129 LBS | OXYGEN SATURATION: 99 % | BODY MASS INDEX: 23.74 KG/M2

## 2017-06-23 DIAGNOSIS — J45.40 MODERATE PERSISTENT ASTHMA WITHOUT COMPLICATION: Primary | ICD-10-CM

## 2017-06-23 DIAGNOSIS — R91.1 PULMONARY NODULE SEEN ON IMAGING STUDY: ICD-10-CM

## 2017-06-23 DIAGNOSIS — J45.20 MILD INTERMITTENT ASTHMA WITHOUT COMPLICATION: ICD-10-CM

## 2017-06-23 DIAGNOSIS — J45.30 MILD PERSISTENT ASTHMA WITHOUT COMPLICATION: ICD-10-CM

## 2017-06-23 PROBLEM — I27.20 PULMONARY HYPERTENSION: Status: RESOLVED | Noted: 2017-06-20 | Resolved: 2017-06-23

## 2017-06-23 PROCEDURE — 71020 XR CHEST PA AND LATERAL: CPT | Mod: 26,,, | Performed by: RADIOLOGY

## 2017-06-23 PROCEDURE — 94010 BREATHING CAPACITY TEST: CPT | Mod: S$GLB,,, | Performed by: INTERNAL MEDICINE

## 2017-06-23 PROCEDURE — 1126F AMNT PAIN NOTED NONE PRSNT: CPT | Mod: S$GLB,,, | Performed by: INTERNAL MEDICINE

## 2017-06-23 PROCEDURE — 71020 XR CHEST PA AND LATERAL: CPT | Mod: TC,PO

## 2017-06-23 PROCEDURE — 1159F MED LIST DOCD IN RCRD: CPT | Mod: S$GLB,,, | Performed by: INTERNAL MEDICINE

## 2017-06-23 PROCEDURE — 99214 OFFICE O/P EST MOD 30 MIN: CPT | Mod: 25,S$GLB,, | Performed by: INTERNAL MEDICINE

## 2017-06-23 PROCEDURE — 99999 PR PBB SHADOW E&M-EST. PATIENT-LVL III: CPT | Mod: PBBFAC,,, | Performed by: INTERNAL MEDICINE

## 2017-06-23 NOTE — PROGRESS NOTES
"Subjective:       Patient ID: Gloria Sanz is a 85 y.o. female.    Chief Complaint: Asthma    Ms. Gloria Sanz is 85 years old  Follow-up appointment  Asthma score is 18  She is currently on Breo Ellipta and rescue albuterol  Immunizations are up-to-date  Spirometry showed FEV1 improved by 16% and FVC improved by 48%  She has a pulmonary nodule that'll need follow-up in March 2018  Reviewed all tests with her.  Her asthma is stable and doing well      Review of Systems   Constitutional: Negative.    HENT: Negative.    Eyes: Negative.    Respiratory: Negative.  Negative for cough, sputum production, wheezing, dyspnea on extertion and use of rescue inhaler.    Cardiovascular: Negative.    Genitourinary: Negative.    Endocrine: endocrine negative   Musculoskeletal: Negative.    Skin: Negative.    Gastrointestinal: Negative.    Neurological: Negative.    Psychiatric/Behavioral: Negative.        Objective:       Vitals:    06/23/17 0905   BP: 118/78   BP Location: Right arm   Patient Position: Sitting   BP Method: Manual   Pulse: 73   Resp: 16   SpO2: 99%   Weight: 58.5 kg (129 lb)   Height: 5' 2" (1.575 m)     Physical Exam   Constitutional: She is oriented to person, place, and time. She appears well-developed and well-nourished.   HENT:   Head: Normocephalic.   Nose: Nose normal.   Neck: Normal range of motion. Neck supple. No JVD present.   Cardiovascular: Normal rate, regular rhythm and normal heart sounds.    Pulmonary/Chest: Normal expansion, symmetric chest wall expansion, effort normal and breath sounds normal.   Abdominal: Soft. Bowel sounds are normal.   Musculoskeletal: Normal range of motion.   Lymphadenopathy:     She has no cervical adenopathy.   Neurological: She is alert and oriented to person, place, and time.   Skin: Skin is warm and dry.   Psychiatric: She has a normal mood and affect.   Nursing note and vitals reviewed.    Personal Diagnostic Review  Pulmonary function tests: FEV1: 1.56  (121 % " predicted), FVC:  2.29 (136 % predicted), FEV1/FVC:  68    Overall FEV1 improved by 16%, FVC improved by 48%    CXR  Findings: Heart size is within normal limits.  There is tortuosity and atherosclerosis of the aorta.  Mild prominence of the basilar interstitial markings similar to before.  No confluent infiltrate or pleural effusion.  Mild thoracic spondylosis.  Surgical clip projects over the left apex.  No flowsheet data found.      Assessment:       Problem List Items Addressed This Visit     Moderate persistent asthma without complication - Primary     Asthma ROS:   She is taking medications regularly as instructed, no medication side effects noted, no significant ongoing wheezing or shortness of breath.   BREO and rescure albuterol  ACT score 19  New concerns: None.     Exam: appears well, vitals normal, no respiratory distress, acyanotic, normal RR, chest clear, no wheezing, crepitations, rhonchi, normal symmetric air entry.     Assessment: Asthma well controlled.     Plan: Cont  BREO. CONTINUE ALBUTEROL. Orders as documented in EMR.Re evaluate in 1 year         Pulmonary nodule seen on imaging study     Ct 03/2018             Other Visit Diagnoses    None.       Plan:       Asthma stable    Return in about 9 months (around 3/19/2018), or with scheduled test: CT and [place].    This note was prepared using voice recognition system and is likely to have sound alike errors that may have been overlooked even after proof reading.  Please call me with any questions    Discussed diagnosis, its evaluation, treatment and usual course. All questions answered.    Thank you for the courtesy of participating in the care of this patient    Parth Bennett MD

## 2017-06-23 NOTE — ASSESSMENT & PLAN NOTE
Asthma ROS:   She is taking medications regularly as instructed, no medication side effects noted, no significant ongoing wheezing or shortness of breath.   BREO and rescure albuterol  ACT score 19  New concerns: None.     Exam: appears well, vitals normal, no respiratory distress, acyanotic, normal RR, chest clear, no wheezing, crepitations, rhonchi, normal symmetric air entry.     Assessment: Asthma well controlled.     Plan: Cont  BREO. CONTINUE ALBUTEROL. Orders as documented in EMR.Re evaluate in 1 year

## 2017-06-23 NOTE — LETTER
June 23, 2017      Ivon Feliz, LINETTE  [address] [facility] [place] [address]           [facility] - Pulmonary Services  [address] [facility] [place] [address]  Phone: [phone]  Fax: [phone]          Patient: Gloria Sanz   MR Number: [id number]   YOB: 1931   Date of Visit: 6/23/2017       Dear Ivon Feliz:    Thank you for referring Gloria Sanz to me for evaluation. Attached you will find relevant portions of my assessment and plan of care.    If you have questions, please do not hesitate to call me. I look forward to following Gloria Sanz along with you.    Sincerely,    Parth Bennett MD    Enclosure  CC:  No Recipients    If you would like to receive this communication electronically, please contact [email or web address] or [phone] to request more information on [employer] Link access.    For providers and/or their staff who would like to refer a patient to [facility], please contact us through our one-stop-shop provider referral line, [facility] , at [phone].    If you feel you have received this communication in error or would no longer like to receive these types of communications, please e-mail [email or web address]

## 2017-06-29 ENCOUNTER — OFFICE VISIT (OUTPATIENT)
Dept: HEMATOLOGY/ONCOLOGY | Facility: CLINIC | Age: 82
End: 2017-06-29
Payer: COMMERCIAL

## 2017-06-29 ENCOUNTER — INFUSION (OUTPATIENT)
Dept: INFUSION THERAPY | Facility: HOSPITAL | Age: 82
End: 2017-06-29
Attending: INTERNAL MEDICINE
Payer: COMMERCIAL

## 2017-06-29 ENCOUNTER — LAB VISIT (OUTPATIENT)
Dept: LAB | Facility: HOSPITAL | Age: 82
End: 2017-06-29
Attending: INTERNAL MEDICINE
Payer: COMMERCIAL

## 2017-06-29 VITALS
WEIGHT: 134.94 LBS | WEIGHT: 134.94 LBS | TEMPERATURE: 98 F | DIASTOLIC BLOOD PRESSURE: 82 MMHG | RESPIRATION RATE: 16 BRPM | SYSTOLIC BLOOD PRESSURE: 160 MMHG | OXYGEN SATURATION: 99 % | BODY MASS INDEX: 24.83 KG/M2 | HEART RATE: 86 BPM | HEIGHT: 62 IN | BODY MASS INDEX: 24.68 KG/M2

## 2017-06-29 DIAGNOSIS — N18.4 CKD (CHRONIC KIDNEY DISEASE) STAGE 4, GFR 15-29 ML/MIN: ICD-10-CM

## 2017-06-29 DIAGNOSIS — N18.30 ANEMIA OF CHRONIC RENAL FAILURE, STAGE 3 (MODERATE): Primary | ICD-10-CM

## 2017-06-29 DIAGNOSIS — D57.3 SICKLE CELL TRAIT: ICD-10-CM

## 2017-06-29 DIAGNOSIS — D56.3 ALPHA THALASSEMIA SILENT CARRIER: ICD-10-CM

## 2017-06-29 DIAGNOSIS — D63.1 ANEMIA OF CHRONIC RENAL FAILURE, STAGE 4 (SEVERE): Primary | ICD-10-CM

## 2017-06-29 DIAGNOSIS — D63.1 ANEMIA OF CHRONIC RENAL FAILURE, STAGE 4 (SEVERE): ICD-10-CM

## 2017-06-29 DIAGNOSIS — N18.4 ANEMIA OF CHRONIC RENAL FAILURE, STAGE 4 (SEVERE): Primary | ICD-10-CM

## 2017-06-29 DIAGNOSIS — N18.4 ANEMIA OF CHRONIC RENAL FAILURE, STAGE 4 (SEVERE): ICD-10-CM

## 2017-06-29 DIAGNOSIS — D63.1 ANEMIA OF CHRONIC RENAL FAILURE, STAGE 3 (MODERATE): Primary | ICD-10-CM

## 2017-06-29 LAB
BASOPHILS # BLD AUTO: 0.01 K/UL
BASOPHILS NFR BLD: 0.2 %
DIFFERENTIAL METHOD: ABNORMAL
EOSINOPHIL # BLD AUTO: 0.1 K/UL
EOSINOPHIL NFR BLD: 1.4 %
ERYTHROCYTE [DISTWIDTH] IN BLOOD BY AUTOMATED COUNT: 14.9 %
HCT VFR BLD AUTO: 28.5 %
HGB BLD-MCNC: 9.4 G/DL
LYMPHOCYTES # BLD AUTO: 1.5 K/UL
LYMPHOCYTES NFR BLD: 23.3 %
MCH RBC QN AUTO: 26.4 PG
MCHC RBC AUTO-ENTMCNC: 33 %
MCV RBC AUTO: 80 FL
MONOCYTES # BLD AUTO: 1 K/UL
MONOCYTES NFR BLD: 15.7 %
NEUTROPHILS # BLD AUTO: 3.9 K/UL
NEUTROPHILS NFR BLD: 59.4 %
PLATELET # BLD AUTO: 186 K/UL
PMV BLD AUTO: 9.8 FL
RBC # BLD AUTO: 3.56 M/UL
WBC # BLD AUTO: 6.51 K/UL

## 2017-06-29 PROCEDURE — 36415 COLL VENOUS BLD VENIPUNCTURE: CPT | Mod: PO

## 2017-06-29 PROCEDURE — 99999 PR PBB SHADOW E&M-EST. PATIENT-LVL III: CPT | Mod: PBBFAC,,, | Performed by: INTERNAL MEDICINE

## 2017-06-29 PROCEDURE — 1126F AMNT PAIN NOTED NONE PRSNT: CPT | Mod: S$GLB,,, | Performed by: INTERNAL MEDICINE

## 2017-06-29 PROCEDURE — 99214 OFFICE O/P EST MOD 30 MIN: CPT | Mod: 25,S$GLB,, | Performed by: INTERNAL MEDICINE

## 2017-06-29 PROCEDURE — 96372 THER/PROPH/DIAG INJ SC/IM: CPT | Mod: PO

## 2017-06-29 PROCEDURE — 63600175 PHARM REV CODE 636 W HCPCS: Mod: PO | Performed by: INTERNAL MEDICINE

## 2017-06-29 PROCEDURE — 85025 COMPLETE CBC W/AUTO DIFF WBC: CPT | Mod: PO

## 2017-06-29 PROCEDURE — 1159F MED LIST DOCD IN RCRD: CPT | Mod: S$GLB,,, | Performed by: INTERNAL MEDICINE

## 2017-06-29 RX ADMIN — ERYTHROPOIETIN 20000 UNITS: 20000 INJECTION, SOLUTION INTRAVENOUS; SUBCUTANEOUS at 01:06

## 2017-06-29 NOTE — PROGRESS NOTES
Subjective:       Patient ID: Gloria Sanz is a 85 y.o. female.    Chief Complaint: Follow-up    HPI This 84 year old AA lady comes for follow uo her anemia associated to chronic renal failure.  She is known to us because in MAy 2010 had a cbc that showed a hemoglobin of 10.6 with an MCV of 79.4. White cell count was 4,380 (differential included 17% monocytes) and a platelet count of 217,000 Review of information in the computer indicated that the patient has had mild anemia dating back a number of years\.    In 1991, her hemoglobin was 11.7 with an MCV of 84. Her MCV through the years has been anywhere from 79-84. The patient has had several iron studies that showed an elevated ferritin, a creatinine of 1.7 as well as serum protein electrophoresis that has failed to show a monoclonal spike.    . B12 levels have been normal.    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    The patient comes today for routine follow up.    At the end dec 2011 she had a severe epistaxis requiring a blood transfusion. She ended up having surgery for nasal polyps In May 2012 she ha had a drop of hr HGB to 8.8 and her creatinine was 1.8 She slowly improved ion her own. SPEp showed a polyclonal gammopathy and her free light chains showed elevations of both the kappa and lambda light chains which goes against clonality.       She had the zoster vaccine in 2008    She is on procrit every 2 weeks when needed    She is know to have a sub-centimeter pulmonary nodule in the right middle lobe, Ct scan in December 2015 showed stability.A CT scan done 3/1027 showed a new nodule, measuring 4.6 mm in the right lung.  She is scheduled to have it repeated in a year ng A multinodular thyroid was found and an US was suggested.The Us showed the same.       She has recurrent epistaxis which is felt to be due  to HBP     She comes for follow up   ALLERGIES: See med card  MEDICATIONS: See MedCard.  PREVIOUS SURGERIES: Thyroid nodule removed many years ago and endometrial  polyp removed in 2009.  Nasal polyps surgery around april 2012  SOCIAL HISTORY: She is  with three children. She lives in [place].  She does not smoke or drink. 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  FAMILY HISTORY: Two sisters have liver cancer, and another one has breast  cancer. Mother had cancer of the gallbladder. Father had a heart attack.  No diabetes in the family.  PAST MEDICAL HISTORY:  1. 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  2. Alpha globin gene rearrangement test showed that she is an alpha  thalassemia carrier.  3. Chronic anemia.  4-elevated creatinine  5. Hypertension.  6. Status post removal of endometrial polyp.  7. Epistaxis-recurrent felt to be due to HBP.  8. hx of epistaxis Dec 2011 ( nasal polyps  Review of Systems   Constitutional: Negative.    HENT: Negative.    Eyes: Negative.    Respiratory: Negative.  Negative for cough and wheezing.    Cardiovascular: Negative.  Negative for chest pain.   Gastrointestinal: Negative.    Genitourinary: Negative.    Neurological: Negative.    Psychiatric/Behavioral: Negative.        Objective:      Physical Exam   Constitutional: She is oriented to person, place, and time. She appears well-developed. No distress.   HENT:   Head: Normocephalic.   Right Ear: Tympanic membrane, external ear and ear canal normal.   Left Ear: Tympanic membrane, external ear and ear canal normal.   Nose: Nose normal. Right sinus exhibits no maxillary sinus tenderness and no frontal sinus tenderness. Left sinus exhibits no maxillary sinus tenderness and no frontal sinus tenderness.   Mouth/Throat: Oropharynx is clear and moist and mucous membranes are normal.   Teeth normal.  Gums normal.   Eyes: Conjunctivae and lids are normal. Pupils are  equal, round, and reactive to light.   Neck: Normal carotid pulses, no hepatojugular reflux and no JVD present. Carotid bruit is not present. No tracheal deviation present. No thyroid mass and no thyromegaly present.   Cardiovascular: Normal rate, regular rhythm, S1 normal, S2 normal, normal heart sounds and intact distal pulses.  Exam reveals no gallop and no friction rub.    No murmur heard.  Carotid exam normal   Pulmonary/Chest: Effort normal and breath sounds normal. No accessory muscle usage. No respiratory distress. She has no wheezes. She has no rales. She exhibits no tenderness.   Abdominal: Soft. Normal appearance. She exhibits no distension and no mass. There is no splenomegaly or hepatomegaly. There is no tenderness. There is no rebound and no guarding.   Musculoskeletal: Normal range of motion. She exhibits no edema or tenderness.        Right hand: Normal.        Left hand: Normal.       Lymphadenopathy:     She has no cervical adenopathy.     She has no axillary adenopathy.        Right: No inguinal and no supraclavicular adenopathy present.        Left: No inguinal and no supraclavicular adenopathy present.   Neurological: She is alert and oriented to person, place, and time. She has normal strength. No cranial nerve deficit. Coordination normal.   Skin: Skin is warm and dry. No rash noted. She is not diaphoretic. No cyanosis or erythema. No pallor. Nails show no clubbing.   Psychiatric: She has a normal mood and affect. Her behavior is normal. Judgment and thought content normal.       Wt Readings from Last 3 Encounters:   06/29/17 61.2 kg (134 lb 14.7 oz)   06/23/17 58.5 kg (129 lb)   06/20/17 61.2 kg (134 lb 14.7 oz)     Temp Readings from Last 3 Encounters:   06/29/17 97.8 °F (36.6 °C) (Oral)   06/02/17 96 °F (35.6 °C)   05/18/17 97.7 °F (36.5 °C) (Oral)     BP Readings from Last 3 Encounters:   06/29/17 (!) 160/82   06/23/17 118/78   06/20/17 136/68     Pulse Readings from Last 3 Encounters:    06/29/17 86   06/23/17 73   06/20/17 (!) 56       Assessment:       1. Anemia of chronic renal failure, stage 4 (severe)    2. CKD (chronic kidney disease) stage 4, GFR 15-29 ml/min    3. Sickle cell trait    4. Alpha thalassemia silent carrier        Wt Readings from Last 3 Encounters:   06/29/17 61.2 kg (134 lb 14.7 oz)   06/23/17 58.5 kg (129 lb)   06/20/17 61.2 kg (134 lb 14.7 oz)     Temp Readings from Last 3 Encounters:   06/29/17 97.8 °F (36.6 °C) (Oral)   06/02/17 96 °F (35.6 °C)   05/18/17 97.7 °F (36.5 °C) (Oral)     BP Readings from Last 3 Encounters:   06/29/17 (!) 160/82   06/23/17 118/78   06/20/17 136/68     Pulse Readings from Last 3 Encounters:   06/29/17 86   06/23/17 73   06/20/17 (!) 56       Plan:       Lab Results   Component Value Date    WBC 6.51 06/29/2017    HGB 9.4 (L) 06/29/2017    HCT 28.5 (L) 06/29/2017    MCV 80 (L) 06/29/2017     06/29/2017     Lab Results   Component Value Date    CREATININE 4.0 (H) 05/18/2017     She will reestart Procrit. See me in 4 weeks with a cbc and a nurses appointemnt

## 2017-07-06 DIAGNOSIS — I10 ESSENTIAL HYPERTENSION: ICD-10-CM

## 2017-07-06 RX ORDER — HYDRALAZINE HYDROCHLORIDE 50 MG/1
TABLET, FILM COATED ORAL
Qty: 90 TABLET | Refills: 11 | Status: SHIPPED | OUTPATIENT
Start: 2017-07-06 | End: 2017-12-27

## 2017-07-13 ENCOUNTER — INFUSION (OUTPATIENT)
Dept: INFUSION THERAPY | Facility: HOSPITAL | Age: 82
End: 2017-07-13
Attending: INTERNAL MEDICINE
Payer: COMMERCIAL

## 2017-07-13 VITALS
SYSTOLIC BLOOD PRESSURE: 116 MMHG | BODY MASS INDEX: 24.68 KG/M2 | WEIGHT: 134.94 LBS | HEART RATE: 86 BPM | DIASTOLIC BLOOD PRESSURE: 72 MMHG

## 2017-07-13 DIAGNOSIS — D63.1 ANEMIA OF CHRONIC RENAL FAILURE, STAGE 3 (MODERATE): Primary | ICD-10-CM

## 2017-07-13 DIAGNOSIS — N18.30 ANEMIA OF CHRONIC RENAL FAILURE, STAGE 3 (MODERATE): Primary | ICD-10-CM

## 2017-07-13 PROCEDURE — 63600175 PHARM REV CODE 636 W HCPCS: Mod: PO,EC | Performed by: INTERNAL MEDICINE

## 2017-07-13 PROCEDURE — 96372 THER/PROPH/DIAG INJ SC/IM: CPT | Mod: PO

## 2017-07-13 RX ADMIN — ERYTHROPOIETIN 20000 UNITS: 20000 INJECTION, SOLUTION INTRAVENOUS; SUBCUTANEOUS at 10:07

## 2017-07-19 DIAGNOSIS — I10 ESSENTIAL HYPERTENSION: ICD-10-CM

## 2017-07-19 RX ORDER — DILTIAZEM HYDROCHLORIDE 180 MG/1
CAPSULE, COATED, EXTENDED RELEASE ORAL
Qty: 30 CAPSULE | Refills: 11 | Status: SHIPPED | OUTPATIENT
Start: 2017-07-19

## 2017-07-31 ENCOUNTER — OFFICE VISIT (OUTPATIENT)
Dept: HEMATOLOGY/ONCOLOGY | Facility: CLINIC | Age: 82
End: 2017-07-31
Payer: COMMERCIAL

## 2017-07-31 VITALS
HEART RATE: 83 BPM | OXYGEN SATURATION: 98 % | DIASTOLIC BLOOD PRESSURE: 83 MMHG | RESPIRATION RATE: 16 BRPM | TEMPERATURE: 97 F | HEIGHT: 62 IN | SYSTOLIC BLOOD PRESSURE: 155 MMHG | BODY MASS INDEX: 23.74 KG/M2 | WEIGHT: 129 LBS

## 2017-07-31 DIAGNOSIS — D63.1 ANEMIA, CHRONIC RENAL FAILURE, STAGE 4 (SEVERE): Primary | ICD-10-CM

## 2017-07-31 DIAGNOSIS — N18.4 ANEMIA, CHRONIC RENAL FAILURE, STAGE 4 (SEVERE): Primary | ICD-10-CM

## 2017-07-31 PROCEDURE — 1126F AMNT PAIN NOTED NONE PRSNT: CPT | Mod: S$GLB,,, | Performed by: INTERNAL MEDICINE

## 2017-07-31 PROCEDURE — 99213 OFFICE O/P EST LOW 20 MIN: CPT | Mod: S$GLB,,, | Performed by: INTERNAL MEDICINE

## 2017-07-31 PROCEDURE — 99999 PR PBB SHADOW E&M-EST. PATIENT-LVL IV: CPT | Mod: PBBFAC,,, | Performed by: INTERNAL MEDICINE

## 2017-07-31 PROCEDURE — 1159F MED LIST DOCD IN RCRD: CPT | Mod: S$GLB,,, | Performed by: INTERNAL MEDICINE

## 2017-07-31 NOTE — PROGRESS NOTES
Subjective:       Patient ID: Gloria Sanz is a 85 y.o. female.    Chief Complaint: Anemia    HPI This 84 year old AA lady comes for follow uo her anemia associated to chronic renal failure.  She is known to us because in MAy 2010 had a cbc that showed a hemoglobin of 10.6 with an MCV of 79.4. White cell count was 4,380 (differential included 17% monocytes) and a platelet count of 217,000 Review of information in the computer indicated that the patient has had mild anemia dating back a number of years\.    In 1991, her hemoglobin was 11.7 with an MCV of 84. Her MCV through the years has been anywhere from 79-84. The patient has had several iron studies that showed an elevated ferritin, a creatinine of 1.7 as well as serum protein electrophoresis that has failed to show a monoclonal spike.    . B12 levels have been normal.    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    The patient comes today for routine follow up.    At the end dec 2011 she had a severe epistaxis requiring a blood transfusion. She ended up having surgery for nasal polyps In May 2012 she ha had a drop of hr HGB to 8.8 and her creatinine was 1.8 She slowly improved ion her own. SPEp showed a polyclonal gammopathy and her free light chains showed elevations of both the kappa and lambda light chains which goes against clonality.       She had the zoster vaccine in 2008    She is on procrit every 2 weeks when needed    She is know to have a sub-centimeter pulmonary nodule in the right middle lobe, Ct scan in December 2015 showed stability.A CT scan done 3/1027 showed a new nodule, measuring 4.6 mm in the right lung.  She is scheduled to have it repeated in a year ng A multinodular thyroid was found and an US was suggested.The Us showed the same.       She has recurrent epistaxis which is felt to be due to  HBP     She comes for follow up to see if she will need procrit.    ALLERGIES: See med card  MEDICATIONS: See MedCard.  PREVIOUS SURGERIES: Thyroid nodule removed many years ago and endometrial  polyp removed in 2009.  Nasal polyps surgery around april 2012  SOCIAL HISTORY: She is  with three children. She lives in [place].  She does not smoke or drink. 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  FAMILY HISTORY: Two sisters have liver cancer, and another one has breast  cancer. Mother had cancer of the gallbladder. Father had a heart attack.  No diabetes in the family.  PAST MEDICAL HISTORY:  1. 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  2. Alpha globin gene rearrangement test showed that she is an alpha  thalassemia carrier.  3. Chronic anemia.  4-elevated creatinine  5. Hypertension.  6. Status post removal of endometrial polyp.  7. Epistaxis-recurrent felt to be due to HBP.  8. hx of epistaxis Dec 2011 ( nasal polyps  Review of Systems   Constitutional: Negative.  Negative for appetite change and unexpected weight change.   HENT: Negative.    Eyes: Negative.  Negative for visual disturbance.   Respiratory: Negative.  Negative for cough, shortness of breath and wheezing.    Cardiovascular: Negative.  Negative for chest pain.   Gastrointestinal: Negative.  Negative for abdominal pain and diarrhea.   Genitourinary: Negative.  Negative for frequency.   Musculoskeletal: Negative for back pain.   Skin: Negative for rash.   Neurological: Negative.  Negative for headaches.   Hematological: Negative for adenopathy.   Psychiatric/Behavioral: Negative.  The patient is not nervous/anxious.        Objective:      Physical Exam   Constitutional: She is oriented to person, place, and time. She appears well-developed. No distress.   HENT:   Head: Normocephalic.   Right Ear: Tympanic membrane and ear canal normal.   Left Ear: Tympanic membrane and ear canal normal.    Nose: Right sinus exhibits no maxillary sinus tenderness and no frontal sinus tenderness. Left sinus exhibits no maxillary sinus tenderness and no frontal sinus tenderness.   Mouth/Throat: Mucous membranes are normal.   Teeth normal.  Gums normal.   Eyes: Conjunctivae and lids are normal. Pupils are equal, round, and reactive to light.   Neck: Normal carotid pulses, no hepatojugular reflux and no JVD present. Carotid bruit is not present. No tracheal deviation present. No thyroid mass and no thyromegaly present.   Cardiovascular: Normal rate, regular rhythm, S1 normal, S2 normal, normal heart sounds and intact distal pulses.  Exam reveals no gallop and no friction rub.    No murmur heard.  1-26 NE at the base  Carotid exam normal   Pulmonary/Chest: Effort normal and breath sounds normal. No accessory muscle usage. No respiratory distress. She has no wheezes. She has no rales. She exhibits no tenderness.   Abdominal: Soft. Normal appearance. She exhibits no distension and no mass. There is no splenomegaly or hepatomegaly. There is no tenderness. There is no rebound and no guarding.   Musculoskeletal: Normal range of motion. She exhibits no edema or tenderness.        Right hand: Normal.        Left hand: Normal.       Lymphadenopathy:     She has no cervical adenopathy.     She has no axillary adenopathy.        Right: No inguinal and no supraclavicular adenopathy present.        Left: No inguinal and no supraclavicular adenopathy present.   Neurological: She is alert and oriented to person, place, and time. She has normal strength. No cranial nerve deficit. Coordination normal.   Skin: Skin is warm and dry. No rash noted. She is not diaphoretic. No cyanosis or erythema. No pallor. Nails show no clubbing.   Psychiatric: She has a normal mood and affect. Her behavior is normal. Judgment and thought content normal.       Wt Readings from Last 3 Encounters:   07/31/17 58.5 kg (129 lb)   07/13/17 61.2 kg (134 lb 14.7  oz)   06/29/17 61.2 kg (134 lb 14.7 oz)     Temp Readings from Last 3 Encounters:   07/31/17 97 °F (36.1 °C)   06/29/17 97.8 °F (36.6 °C) (Oral)   06/02/17 96 °F (35.6 °C)     BP Readings from Last 3 Encounters:   07/31/17 (!) 155/83   07/13/17 116/72   06/29/17 (!) 160/82     Pulse Readings from Last 3 Encounters:   07/31/17 83   07/13/17 86   06/29/17 86       Assessment:       1. Anemia, chronic renal failure, stage 4 (severe)        Plan:       Lab Results   Component Value Date    WBC 3.94 07/31/2017    HGB 10.2 (L) 07/31/2017    HCT 30.1 (L) 07/31/2017    MCV 80 (L) 07/31/2017     07/31/2017     .  Lab Results   Component Value Date    CREATININE 4.0 (H) 05/18/2017       She is doing well. No Procrit today. See me and nurses in 4 weeks with a cbc

## 2017-09-01 DIAGNOSIS — E87.20 ACIDOSIS, METABOLIC: ICD-10-CM

## 2017-09-01 RX ORDER — SODIUM BICARBONATE 650 MG/1
TABLET ORAL
Qty: 90 TABLET | Refills: 2 | Status: SHIPPED | OUTPATIENT
Start: 2017-09-01 | End: 2017-12-05 | Stop reason: SDUPTHER

## 2017-09-05 ENCOUNTER — TELEPHONE (OUTPATIENT)
Dept: HEMATOLOGY/ONCOLOGY | Facility: CLINIC | Age: 82
End: 2017-09-05

## 2017-09-05 NOTE — TELEPHONE ENCOUNTER
----- Message from Bernadette Craft sent at 9/1/2017  2:16 PM CDT -----  Contact: Jordyn Alvarez contiunatrae  Calling concerning an authorization update of Procrit for patient. Please call Jordyn @ [phone]. Thanks, calvin

## 2017-09-07 ENCOUNTER — INFUSION (OUTPATIENT)
Dept: INFUSION THERAPY | Facility: HOSPITAL | Age: 82
End: 2017-09-07
Attending: INTERNAL MEDICINE
Payer: COMMERCIAL

## 2017-09-07 ENCOUNTER — OFFICE VISIT (OUTPATIENT)
Dept: HEMATOLOGY/ONCOLOGY | Facility: CLINIC | Age: 82
End: 2017-09-07
Payer: COMMERCIAL

## 2017-09-07 ENCOUNTER — OFFICE VISIT (OUTPATIENT)
Dept: PODIATRY | Facility: CLINIC | Age: 82
End: 2017-09-07
Payer: COMMERCIAL

## 2017-09-07 VITALS
SYSTOLIC BLOOD PRESSURE: 160 MMHG | OXYGEN SATURATION: 100 % | BODY MASS INDEX: 23.74 KG/M2 | WEIGHT: 129 LBS | BODY MASS INDEX: 24.8 KG/M2 | DIASTOLIC BLOOD PRESSURE: 70 MMHG | HEIGHT: 62 IN | WEIGHT: 140 LBS | RESPIRATION RATE: 18 BRPM | TEMPERATURE: 98 F | HEART RATE: 107 BPM | HEIGHT: 63 IN

## 2017-09-07 VITALS — BODY MASS INDEX: 24.8 KG/M2 | WEIGHT: 140 LBS | HEIGHT: 63 IN

## 2017-09-07 DIAGNOSIS — G60.9 IDIOPATHIC PERIPHERAL NEUROPATHY: ICD-10-CM

## 2017-09-07 DIAGNOSIS — D63.1 ANEMIA OF CHRONIC RENAL FAILURE, STAGE 3 (MODERATE): Primary | ICD-10-CM

## 2017-09-07 DIAGNOSIS — M20.11 HALLUX VALGUS WITH BUNIONS, RIGHT: ICD-10-CM

## 2017-09-07 DIAGNOSIS — L84 CORN OR CALLUS: ICD-10-CM

## 2017-09-07 DIAGNOSIS — M21.611 HALLUX VALGUS WITH BUNIONS, RIGHT: ICD-10-CM

## 2017-09-07 DIAGNOSIS — N18.4 ANEMIA OF CHRONIC RENAL FAILURE, STAGE 4 (SEVERE): Primary | ICD-10-CM

## 2017-09-07 DIAGNOSIS — B35.1 DERMATOPHYTOSIS OF NAIL: Primary | ICD-10-CM

## 2017-09-07 DIAGNOSIS — N18.30 ANEMIA OF CHRONIC RENAL FAILURE, STAGE 3 (MODERATE): Primary | ICD-10-CM

## 2017-09-07 DIAGNOSIS — D63.1 ANEMIA OF CHRONIC RENAL FAILURE, STAGE 4 (SEVERE): Primary | ICD-10-CM

## 2017-09-07 PROCEDURE — 1159F MED LIST DOCD IN RCRD: CPT | Mod: S$GLB,,, | Performed by: PODIATRIST

## 2017-09-07 PROCEDURE — 99999 PR PBB SHADOW E&M-EST. PATIENT-LVL III: CPT | Mod: PBBFAC,,, | Performed by: PODIATRIST

## 2017-09-07 PROCEDURE — 3078F DIAST BP <80 MM HG: CPT | Mod: S$GLB,,, | Performed by: PODIATRIST

## 2017-09-07 PROCEDURE — 99213 OFFICE O/P EST LOW 20 MIN: CPT | Mod: 25,S$GLB,, | Performed by: INTERNAL MEDICINE

## 2017-09-07 PROCEDURE — 63600175 PHARM REV CODE 636 W HCPCS: Mod: PO | Performed by: INTERNAL MEDICINE

## 2017-09-07 PROCEDURE — 1159F MED LIST DOCD IN RCRD: CPT | Mod: S$GLB,,, | Performed by: INTERNAL MEDICINE

## 2017-09-07 PROCEDURE — 96372 THER/PROPH/DIAG INJ SC/IM: CPT | Mod: PO

## 2017-09-07 PROCEDURE — 3077F SYST BP >= 140 MM HG: CPT | Mod: S$GLB,,, | Performed by: PODIATRIST

## 2017-09-07 PROCEDURE — 99999 PR PBB SHADOW E&M-EST. PATIENT-LVL IV: CPT | Mod: PBBFAC,,, | Performed by: INTERNAL MEDICINE

## 2017-09-07 PROCEDURE — 3078F DIAST BP <80 MM HG: CPT | Mod: S$GLB,,, | Performed by: INTERNAL MEDICINE

## 2017-09-07 PROCEDURE — 11721 DEBRIDE NAIL 6 OR MORE: CPT | Mod: 59,Q9,S$GLB, | Performed by: PODIATRIST

## 2017-09-07 PROCEDURE — 3008F BODY MASS INDEX DOCD: CPT | Mod: S$GLB,,, | Performed by: INTERNAL MEDICINE

## 2017-09-07 PROCEDURE — 1126F AMNT PAIN NOTED NONE PRSNT: CPT | Mod: S$GLB,,, | Performed by: INTERNAL MEDICINE

## 2017-09-07 PROCEDURE — 3008F BODY MASS INDEX DOCD: CPT | Mod: S$GLB,,, | Performed by: PODIATRIST

## 2017-09-07 PROCEDURE — 3077F SYST BP >= 140 MM HG: CPT | Mod: S$GLB,,, | Performed by: INTERNAL MEDICINE

## 2017-09-07 PROCEDURE — 99213 OFFICE O/P EST LOW 20 MIN: CPT | Mod: 25,S$GLB,, | Performed by: PODIATRIST

## 2017-09-07 PROCEDURE — 11056 PARNG/CUTG B9 HYPRKR LES 2-4: CPT | Mod: Q9,S$GLB,, | Performed by: PODIATRIST

## 2017-09-07 RX ADMIN — ERYTHROPOIETIN 20000 UNITS: 20000 INJECTION, SOLUTION INTRAVENOUS; SUBCUTANEOUS at 10:09

## 2017-09-07 NOTE — PATIENT INSTRUCTIONS
[facility]  [address]  [phone] phone     [phone] fax  Hours of Operation: Monday- Friday 8:00am- 5:00pm  After hours phone  [phone]  Hematology / Oncology Physicians on call      Dr. Fernando Napoles                        Please call with any concerns regarding your appointment today.

## 2017-09-07 NOTE — PROGRESS NOTES
Subjective:       Patient ID: Gloria Sanz is a 85 y.o. female.    Chief Complaint: Follow-up    HPI This 84 year old AA lady comes for follow uo her anemia associated to chronic renal failure.  She is known to us because in MAy 2010 had a cbc that showed a hemoglobin of 10.6 with an MCV of 79.4. White cell count was 4,380 (differential included 17% monocytes) and a platelet count of 217,000 Review of information in the computer indicated that the patient has had mild anemia dating back a number of years\.    In 1991, her hemoglobin was 11.7 with an MCV of 84. Her MCV through the years has been anywhere from 79-84. The patient has had several iron studies that showed an elevated ferritin, a creatinine of 1.7 as well as serum protein electrophoresis that has failed to show a monoclonal spike.    . B12 levels have been normal.    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    The patient comes today for routine follow up.    At the end dec 2011 she had a severe epistaxis requiring a blood transfusion. She ended up having surgery for nasal polyps In May 2012 she ha had a drop of hr HGB to 8.8 and her creatinine was 1.8 She slowly improved ion her own. SPEp showed a polyclonal gammopathy and her free light chains showed elevations of both the kappa and lambda light chains which goes against clonality.       She had the zoster vaccine in 2008    She is on procrit every 2 weeks when needed    She is know to have a sub-centimeter pulmonary nodule in the right middle lobe, Ct scan in December 2015 showed stability.A CT scan done 3/1027 showed a new nodule, measuring 4.6 mm in the right lung.  She is scheduled to have it repeated in a year ng A multinodular thyroid was found and an US was suggested.The Us showed the same.       She has recurrent epistaxis which is felt to be due  to HBP     She comes for follow up to see if she will need procrit.     ALLERGIES: See med card  MEDICATIONS: See MedCard.  PREVIOUS SURGERIES: Thyroid nodule removed many years ago and endometrial  polyp removed in 2009.  Nasal polyps surgery around april 2012  SOCIAL HISTORY: She is  with three children. She lives in [place].  She does not smoke or drink. 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  FAMILY HISTORY: Two sisters have liver cancer, and another one has breast  cancer. Mother had cancer of the gallbladder. Father had a heart attack.  No diabetes in the family.  PAST MEDICAL HISTORY:  1. 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  2. Alpha globin gene rearrangement test showed that she is an alpha  thalassemia carrier.  3. Chronic anemia.  4-elevated creatinine  5. Hypertension.  6. Status post removal of endometrial polyp.  7. Epistaxis-recurrent felt to be due to HBP.  8. hx of epistaxis Dec 2011 ( nasal polyps  Review of Systems   Constitutional: Negative for appetite change and unexpected weight change.   Eyes: Negative for visual disturbance.   Respiratory: Negative for cough and shortness of breath.    Cardiovascular: Negative for chest pain.   Gastrointestinal: Negative for abdominal pain and diarrhea.   Genitourinary: Negative for frequency.   Musculoskeletal: Negative for back pain.   Skin: Negative for rash.   Neurological: Negative for headaches.   Hematological: Negative for adenopathy.   Psychiatric/Behavioral: The patient is not nervous/anxious.        Objective:      Physical Exam   Constitutional: She is oriented to person, place, and time. She appears well-developed. No distress.   HENT:   Head: Normocephalic.   Right Ear: Tympanic membrane and ear canal normal.   Left Ear: Tympanic membrane and ear canal normal.   Nose: Nose normal. Right sinus exhibits no maxillary sinus tenderness and no frontal sinus tenderness. Left  sinus exhibits no maxillary sinus tenderness and no frontal sinus tenderness.   Mouth/Throat: Mucous membranes are normal.   Teeth normal.  Gums normal.   Eyes: Lids are normal. Pupils are equal, round, and reactive to light.   Neck: Normal carotid pulses, no hepatojugular reflux and no JVD present. Carotid bruit is not present. No tracheal deviation present. No thyroid mass and no thyromegaly present.   Cardiovascular: Normal rate, S1 normal and S2 normal.  Exam reveals no gallop and no friction rub.    No murmur heard.  Carotid exam normal   Pulmonary/Chest: Breath sounds normal. No accessory muscle usage. No respiratory distress. She has no wheezes. She has no rales. She exhibits no tenderness.   Abdominal: Soft. Normal appearance. She exhibits no distension and no mass. There is no splenomegaly or hepatomegaly. There is no tenderness. There is no rebound and no guarding.   Musculoskeletal: Normal range of motion. She exhibits no edema or tenderness.        Right hand: Normal.        Left hand: Normal.       Lymphadenopathy:     She has no cervical adenopathy.     She has no axillary adenopathy.        Right: No inguinal and no supraclavicular adenopathy present.        Left: No inguinal and no supraclavicular adenopathy present.   Neurological: She is alert and oriented to person, place, and time. She has normal strength. No cranial nerve deficit. Coordination normal.   Skin: Skin is warm and dry. No rash noted. She is not diaphoretic. No cyanosis or erythema. No pallor. Nails show no clubbing.   Psychiatric: She has a normal mood and affect. Her behavior is normal. Judgment and thought content normal.       Wt Readings from Last 3 Encounters:   09/07/17 63.5 kg (139 lb 15.9 oz)   09/07/17 58.5 kg (128 lb 15.5 oz)   07/31/17 58.5 kg (129 lb)     Temp Readings from Last 3 Encounters:   09/07/17 97.9 °F (36.6 °C) (Oral)   07/31/17 97 °F (36.1 °C)   06/29/17 97.8 °F (36.6 °C) (Oral)     BP Readings from Last 3  Encounters:   09/07/17 (!) 160/70   07/31/17 (!) 155/83   07/13/17 116/72     Pulse Readings from Last 3 Encounters:   09/07/17 107   07/31/17 83   07/13/17 86       Assessment:       1. Anemia of chronic renal failure, stage 4 (severe)        Plan:       Lab Results   Component Value Date    CREATININE 4.0 (H) 05/18/2017     .  Lab Results   Component Value Date    WBC 6.38 09/07/2017    HGB 9.1 (L) 09/07/2017    HCT 26.9 (L) 09/07/2017    MCV 80 (L) 09/07/2017     09/07/2017       She will be asked to receive procrit now and in 2 weeks. See me and nurses in 4 weeks with a cbc

## 2017-09-07 NOTE — PROGRESS NOTES
[facility]  PODIATRIC MEDICINE AND SURGERY  PROGRESS NOTE     CHIEF COMPLAINT  Chief Complaint   Patient presents with    Routine Foot Care     at risk foot/nail care PCP Dr. Desouza    Foot Problem     right bunion pain with swollen callous         HPI    SUBJECTIVE: Gloria Sanz is a 85 y.o. female who  has a past medical history of Anemia; Anemia of chronic renal failure (5/12/2014); Asthma, chronic; Cataract; GERD (gastroesophageal reflux disease); Gout, arthritis; Helicobacter pylori gastritis; Hypertension; Osteoarthritis; Pulmonary HTN; and Renal insufficiency. Gloriapresents to clinic for high risk  foot exam and care.  Gloria admits numbness, burning, and/or tingling sensations in their feet. Patient admits to painful toenails aggravated by increased weight bearing, shoe gear, and pressure. States pain is relieved with routine debridements.    She also complains about painful bunion on right foot. She has been treated for this in the past but pain still persists.  Patient has no other pedal complaints at this time.    HgA1c:   Hemoglobin A1C   Date Value Ref Range Status   02/10/2011 5.4 4.0 - 6.2 % Final         PMH  Past Medical History:   Diagnosis Date    Anemia     sickle cell trait, alpha thalessemia    Anemia of chronic renal failure 5/12/2014    Asthma, chronic     Cataract     GERD (gastroesophageal reflux disease)     Gout, arthritis     Helicobacter pylori gastritis     Hypertension     Osteoarthritis     Pulmonary HTN     Renal insufficiency      Patient Active Problem List   Diagnosis    Hypertension    Helicobacter pylori gastritis    Osteoporosis, senile    HTN (hypertension)    Arthritis of knee    Acquired valgus deformity knee    Alpha thalassemia silent carrier    Sickle cell trait    Anemia of chronic renal failure    Proteinuria    Acute conjunctivitis of right eye    Kidney cysts    Epistaxis, recurrent    Diverticulosis    Gallstones     Rash    Sinusitis    Dehydration    TIA (transient ischemic attack)    Hyperkalemia    Moderate persistent asthma without complication    Pulmonary nodule seen on imaging study    Malignant hypertension    Abnormal ECG    CKD (chronic kidney disease) stage 4, GFR 15-29 ml/min    Overactive bladder    Dysphagia    Nonrheumatic aortic valve stenosis    LVH (left ventricular hypertrophy)    Non-rheumatic tricuspid valve insufficiency       MEDS  Current Outpatient Prescriptions on File Prior to Visit   Medication Sig Dispense Refill    albuterol 90 mcg/actuation inhaler Inhale 2 puffs into the lungs every 6 (six) hours as needed for Wheezing. 1 each 11    ammonium lactate (AMLACTIN) 12 % lotion Use daily.  Apply to damp skin after bathing. 225 g 11    aspirin (ECOTRIN) 81 MG EC tablet Take 81 mg by mouth once daily.      B complex vitamins (B COMPLEX) TbSR Take by mouth. 1 Tablet Extended Release Oral Every day      CALCIUM CARBONATE/VITAMIN D3 (CALCIUM 500 + D, D3, ORAL) Take by mouth. 1 Tablet Oral Twice a day      cloNIDine (CATAPRES) 0.3 MG tablet take 1 tablet by mouth twice a day 60 tablet 3    diltiaZEM (CARDIZEM CD) 180 MG 24 hr capsule take 1 capsule by mouth once daily 30 capsule 11    fluticasone (FLONASE) 50 mcg/actuation nasal spray 2 sprays by Each Nare route daily as needed.  0    fluticasone-vilanterol (BREO) 100-25 mcg/dose diskus inhaler Inhale 1 puff into the lungs once daily. 60 each 11    hydrALAZINE (APRESOLINE) 50 MG tablet take 1 tablet by mouth three times a day 90 tablet 11    ketoconazole (NIZORAL) 2 % shampoo Wash hair with medicated shampoo at least 2x/week - let sit on scalp at least 5 minutes prior to rinsing 120 mL 5    sodium bicarbonate 650 MG tablet TAKE 1 TABLET BY MOUTH THREE TIMES A DAY 90 tablet 2    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 3 nights/week 198.6 g 3     No current facility-administered medications on file prior to visit.        [facility]    "  Past Surgical History:   Procedure Laterality Date    CHOLECYSTECTOMY      endometrial polyp      FETAL BLOOD TRANSFUSION  12/11/2015    2 pints    THYROID SURGERY          ALL  Review of patient's allergies indicates:   Allergen Reactions    Allopurinol analogues Rash and Other (See Comments)     Sores in mouth    Diflucan [fluconazole] Swelling    Ace inhibitors Nausea And Vomiting    Amlodipine Edema    Metronidazole Nausea And Vomiting    Phenytoin sodium extended Nausea And Vomiting       SOC     Social History   Substance Use Topics    Smoking status: Never Smoker    Smokeless tobacco: Never Used    Alcohol use No         Family HX    Family History   Problem Relation Age of Onset    Liver cancer Mother     Cancer Mother     Liver cancer Sister     Breast cancer Sister     Cancer Sister     Glaucoma Sister     Thyroid disease Sister     Melanoma Neg Hx     Eczema Neg Hx     Lupus Neg Hx     Psoriasis Neg Hx        PHYSICAL EXAM  Vitals:    09/07/17 0918   Weight: 58.5 kg (128 lb 15.5 oz)   Height: 5' 2" (1.575 m)      REVIEW OF SYSTEMS  Review of Systems   Constitutional: Negative for chills and fever.   Musculoskeletal: Positive for joint pain.   Skin: Positive for itching.   Neurological: Negative for dizziness and tingling.       GEN:  This patient is well-developed, well-nourished and appears stated age, well-oriented to person, place and time, and cooperative and pleasant on today's visit.      LOWER EXTREMITY  Vascular:   · DP pedal pulse 2/4 b/l, PT pedal pulse 2/4 b/l  · Skin temperature warm to warm from prox to distally  · CFT <5 secs b/l  · There is no edema noted b/l.     Dermatologic:   · Thickened, dystrophic, elongated toenails with subungal debris 1-5 b/l.   · No open skin lesions noted  · No erythema or drainage noted b/l.  · Webspaces are C/D/I B/L.  · There is hyperkeratotic tissue noted dorsum second digit RIGHT foot, medial aspect 1st metatarsal RIGHT  · Skin " texture and turgor   · There is no pedal hair growth noted    Neurologic:  · Protective sensation absent at 0/10 sites upon examination with Pearblossom Weinsten 5.07 g monofilament.   · Propioception intact at 1st MTPJ b/l.   · Babinski reflex absent b/l. Light touch and sharp/dull sensation intact b/l.    Musculoskeletal/Orthopedic:  · No symptomatic structural abnormalities noted.   · Bilateral HAV deformity with prominent medial eminence 1st metatarsal RIGHT with pain upon palpation  · There are semi rigid hammertoe deformities 2-5 b/l  · Muscle strength is 5/5 for foot inverters, everters, plantarflexors, and dorsiflexors. Muscle tone is normal.  · Pain free range of motion in all four quadrants with stiffness and limitation b/l  · Foot type: pronated with decreased medial longituidinal arch         ASSESSMENT  Encounter Diagnoses   Name Primary?    Dermatophytosis of nail Yes    Idiopathic peripheral neuropathy     Corn or callus     Hallux valgus with bunions, right        PLAN  -patient was examined and evaluated    Dermatophytosis of nail    Idiopathic peripheral neuropathy    Corn or callus    Hallux valgus with bunions, right      -Discuss presenting problems, etiology, pathologic processes and management options with patient today.   -I counseled the patient on their conditions, their implications and medical management. An in depth discussion on diabetic management, risk prevention, amputation prevention verbally and provided educational literature in written format.  -With patient's permission, nails were aggressively reduced and debrided x 10 to their soft tissue attachment mechanically and with electric , removing all offending nail and debris. Patient relates relief following the procedure. Patient will continue to monitor the areas daily, inspect feet, wear protective shoe gear when ambulatory, moisturizer to maintain skin integrity.  -sharp excisional debridement of hyperkeratotic lesions  deep to epidermal layer x 2 on right second digit and right 1st digit was performed with a #15 blade without incident.  -discussed bunion deformity and conservative treatment with extra depth shoe, padding for painful prominence    Future Appointments  Date Time Provider Department Center   9/21/2017 10:00 AM CHAIR 07 [facility] CHEMO [place]   10/6/2017 9:40 AM LABORATORY, [facility] LAB [place]   10/6/2017 10:00 AM Robert King MD [facility] HEM ONC [place]   10/6/2017 10:30 AM CHAIR 07 [facility] CHEMO [place]   12/7/2017 9:40 AM Gi Rivera DPM [facility] POD [place]   3/19/2018 9:10 AM [facility] CT1 LIMIT 500 LBS [facility] CT SCAN [place]   3/19/2018 9:40 AM PULMONARY LAB, [facility] PULMLAB [place]   3/19/2018 11:20 AM Parth Bennett MD [facility] PULMSVC [place]

## 2017-09-15 RX ORDER — CLONIDINE HYDROCHLORIDE 0.3 MG/1
TABLET ORAL
Qty: 60 TABLET | Refills: 3 | Status: SHIPPED | OUTPATIENT
Start: 2017-09-15 | End: 2018-01-18 | Stop reason: SDUPTHER

## 2017-09-21 ENCOUNTER — INFUSION (OUTPATIENT)
Dept: INFUSION THERAPY | Facility: HOSPITAL | Age: 82
End: 2017-09-21
Attending: INTERNAL MEDICINE
Payer: COMMERCIAL

## 2017-09-21 VITALS
DIASTOLIC BLOOD PRESSURE: 69 MMHG | HEART RATE: 96 BPM | WEIGHT: 140 LBS | TEMPERATURE: 98 F | BODY MASS INDEX: 25.12 KG/M2 | SYSTOLIC BLOOD PRESSURE: 124 MMHG

## 2017-09-21 DIAGNOSIS — N18.30 ANEMIA OF CHRONIC RENAL FAILURE, STAGE 3 (MODERATE): Primary | ICD-10-CM

## 2017-09-21 DIAGNOSIS — D63.1 ANEMIA OF CHRONIC RENAL FAILURE, STAGE 3 (MODERATE): Primary | ICD-10-CM

## 2017-09-21 PROCEDURE — 63600175 PHARM REV CODE 636 W HCPCS: Mod: PO | Performed by: INTERNAL MEDICINE

## 2017-09-21 PROCEDURE — 96372 THER/PROPH/DIAG INJ SC/IM: CPT | Mod: PO

## 2017-09-21 RX ADMIN — ERYTHROPOIETIN 20000 UNITS: 20000 INJECTION, SOLUTION INTRAVENOUS; SUBCUTANEOUS at 11:09

## 2017-10-06 ENCOUNTER — LAB VISIT (OUTPATIENT)
Dept: LAB | Facility: HOSPITAL | Age: 82
End: 2017-10-06
Attending: INTERNAL MEDICINE
Payer: COMMERCIAL

## 2017-10-06 ENCOUNTER — INFUSION (OUTPATIENT)
Dept: INFUSION THERAPY | Facility: HOSPITAL | Age: 82
End: 2017-10-06
Attending: INTERNAL MEDICINE
Payer: COMMERCIAL

## 2017-10-06 ENCOUNTER — OFFICE VISIT (OUTPATIENT)
Dept: HEMATOLOGY/ONCOLOGY | Facility: CLINIC | Age: 82
End: 2017-10-06
Payer: COMMERCIAL

## 2017-10-06 VITALS
SYSTOLIC BLOOD PRESSURE: 152 MMHG | RESPIRATION RATE: 16 BRPM | DIASTOLIC BLOOD PRESSURE: 87 MMHG | TEMPERATURE: 97 F | OXYGEN SATURATION: 99 % | WEIGHT: 140.88 LBS | BODY MASS INDEX: 24.96 KG/M2 | HEIGHT: 63 IN | HEART RATE: 80 BPM

## 2017-10-06 DIAGNOSIS — D63.1 ANEMIA OF CHRONIC RENAL FAILURE, STAGE 3 (MODERATE): Primary | ICD-10-CM

## 2017-10-06 DIAGNOSIS — N18.30 ANEMIA OF CHRONIC RENAL FAILURE, STAGE 3 (MODERATE): Primary | ICD-10-CM

## 2017-10-06 DIAGNOSIS — N18.4 ANEMIA OF CHRONIC RENAL FAILURE, STAGE 4 (SEVERE): ICD-10-CM

## 2017-10-06 DIAGNOSIS — N18.30 CKD (CHRONIC KIDNEY DISEASE) STAGE 3, GFR 30-59 ML/MIN: ICD-10-CM

## 2017-10-06 DIAGNOSIS — D63.1 ANEMIA OF CHRONIC RENAL FAILURE, STAGE 4 (SEVERE): ICD-10-CM

## 2017-10-06 LAB
BASOPHILS # BLD AUTO: 0 K/UL
BASOPHILS NFR BLD: 0 %
DIFFERENTIAL METHOD: ABNORMAL
EOSINOPHIL # BLD AUTO: 0 K/UL
EOSINOPHIL NFR BLD: 0.7 %
ERYTHROCYTE [DISTWIDTH] IN BLOOD BY AUTOMATED COUNT: 16.3 %
HCT VFR BLD AUTO: 29.6 %
HGB BLD-MCNC: 9.8 G/DL
LYMPHOCYTES # BLD AUTO: 1.3 K/UL
LYMPHOCYTES NFR BLD: 29.4 %
MCH RBC QN AUTO: 26.8 PG
MCHC RBC AUTO-ENTMCNC: 33.1 G/DL
MCV RBC AUTO: 81 FL
MONOCYTES # BLD AUTO: 1 K/UL
MONOCYTES NFR BLD: 22.4 %
NEUTROPHILS # BLD AUTO: 2 K/UL
NEUTROPHILS NFR BLD: 47.5 %
PLATELET # BLD AUTO: 230 K/UL
PMV BLD AUTO: 9.8 FL
RBC # BLD AUTO: 3.65 M/UL
WBC # BLD AUTO: 4.29 K/UL

## 2017-10-06 PROCEDURE — 85025 COMPLETE CBC W/AUTO DIFF WBC: CPT | Mod: PO

## 2017-10-06 PROCEDURE — 36415 COLL VENOUS BLD VENIPUNCTURE: CPT | Mod: PO

## 2017-10-06 PROCEDURE — 99214 OFFICE O/P EST MOD 30 MIN: CPT | Mod: 25,S$GLB,, | Performed by: INTERNAL MEDICINE

## 2017-10-06 PROCEDURE — 96372 THER/PROPH/DIAG INJ SC/IM: CPT | Mod: PO

## 2017-10-06 PROCEDURE — 99999 PR PBB SHADOW E&M-EST. PATIENT-LVL III: CPT | Mod: PBBFAC,,, | Performed by: INTERNAL MEDICINE

## 2017-10-06 PROCEDURE — 63600175 PHARM REV CODE 636 W HCPCS: Mod: PO | Performed by: INTERNAL MEDICINE

## 2017-10-06 RX ADMIN — ERYTHROPOIETIN 20000 UNITS: 20000 INJECTION, SOLUTION INTRAVENOUS; SUBCUTANEOUS at 11:10

## 2017-10-06 NOTE — PROGRESS NOTES
Subjective:       Patient ID: Gloria Sanz is a 86 y.o. female.    Chief Complaint: Anemia    HPI 86-year-old female history of anemia secondary chronic renal failure patient returns for evaluation of Procrit ministration    Past Medical History:   Diagnosis Date    Anemia     sickle cell trait, alpha thalessemia    Anemia of chronic renal failure 5/12/2014    Asthma, chronic     Cataract     GERD (gastroesophageal reflux disease)     Gout, arthritis     Helicobacter pylori gastritis     Hypertension     Osteoarthritis     Pulmonary HTN     Renal insufficiency      Family History   Problem Relation Age of Onset    Liver cancer Mother     Cancer Mother     Liver cancer Sister     Breast cancer Sister     Cancer Sister     Glaucoma Sister     Thyroid disease Sister     Melanoma Neg Hx     Eczema Neg Hx     Lupus Neg Hx     Psoriasis Neg Hx      Social History     Social History    Marital status:      Spouse name: N/A    Number of children: N/A    Years of education: N/A     Occupational History    Not on file.     Social History Main Topics    Smoking status: Never Smoker    Smokeless tobacco: Never Used    Alcohol use No    Drug use: No    Sexual activity: Not on file     Other Topics Concern    Are You Pregnant Or Think You May Be? No    Breast-Feeding No     Social History Narrative    No narrative on file     Past Surgical History:   Procedure Laterality Date    CHOLECYSTECTOMY      endometrial polyp      FETAL BLOOD TRANSFUSION  12/11/2015    2 pints    THYROID SURGERY         Labs:  Lab Results   Component Value Date    WBC 4.29 10/06/2017    HGB 9.8 (L) 10/06/2017    HCT 29.6 (L) 10/06/2017    MCV 81 (L) 10/06/2017     10/06/2017     BMP  Lab Results   Component Value Date     12/20/2016     12/20/2016    K 5.0 12/20/2016    K 5.0 12/20/2016     12/20/2016     12/20/2016    CO2 19 (L) 12/20/2016    CO2 19 (L) 12/20/2016    BUN 65  (H) 12/20/2016    BUN 65 (H) 12/20/2016    CREATININE 4.0 (H) 05/18/2017    CALCIUM 9.7 12/20/2016    CALCIUM 9.7 12/20/2016    ANIONGAP 14 12/20/2016    ANIONGAP 14 12/20/2016    ESTGFRAFRICA 11 (A) 05/18/2017    EGFRNONAA 10 (A) 05/18/2017     Lab Results   Component Value Date    ALT 14 04/11/2016    AST 28 04/11/2016    ALKPHOS 93 04/11/2016    BILITOT 0.3 04/11/2016       Lab Results   Component Value Date    IRON 47 12/20/2016    TIBC 238 (L) 12/20/2016    FERRITIN 225 12/20/2016     Lab Results   Component Value Date    XUFANLXO66 1017 (H) 02/10/2011     Lab Results   Component Value Date    FOLATE 7.6 06/24/2010     Lab Results   Component Value Date    TSH 0.702 04/11/2016         Review of Systems   Constitutional: Positive for fatigue. Negative for activity change, appetite change, chills, diaphoresis, fever and unexpected weight change.   HENT: Negative for congestion, dental problem, drooling, ear discharge, ear pain, facial swelling, hearing loss, mouth sores, nosebleeds, postnasal drip, rhinorrhea, sinus pressure, sneezing, sore throat, tinnitus, trouble swallowing and voice change.    Eyes: Negative for photophobia, pain, discharge, redness, itching and visual disturbance.   Respiratory: Negative for cough, choking, chest tightness, shortness of breath, wheezing and stridor.    Cardiovascular: Negative for chest pain, palpitations and leg swelling.   Gastrointestinal: Negative for abdominal distention, abdominal pain, anal bleeding, blood in stool, constipation, diarrhea, nausea, rectal pain and vomiting.   Endocrine: Negative for cold intolerance, heat intolerance, polydipsia, polyphagia and polyuria.   Genitourinary: Negative for decreased urine volume, difficulty urinating, dyspareunia, dysuria, enuresis, flank pain, frequency, genital sores, hematuria, menstrual problem, pelvic pain, urgency, vaginal bleeding, vaginal discharge and vaginal pain.   Musculoskeletal: Negative for arthralgias, back  pain, gait problem, joint swelling, myalgias, neck pain and neck stiffness.   Skin: Negative for color change, pallor and rash.   Allergic/Immunologic: Negative for environmental allergies, food allergies and immunocompromised state.   Neurological: Positive for weakness. Negative for dizziness, tremors, seizures, syncope, facial asymmetry, speech difficulty, light-headedness, numbness and headaches.   Hematological: Negative for adenopathy. Does not bruise/bleed easily.   Psychiatric/Behavioral: Positive for dysphoric mood. Negative for agitation, behavioral problems, confusion, decreased concentration, hallucinations, self-injury, sleep disturbance and suicidal ideas. The patient is nervous/anxious. The patient is not hyperactive.        Objective:      Physical Exam   Constitutional: She is oriented to person, place, and time. She has a sickly appearance. She appears ill. She appears distressed.   HENT:   Head: Normocephalic and atraumatic.   Right Ear: External ear normal.   Left Ear: External ear normal.   Nose: Nose normal. Right sinus exhibits no maxillary sinus tenderness and no frontal sinus tenderness. Left sinus exhibits no maxillary sinus tenderness and no frontal sinus tenderness.   Mouth/Throat: Oropharynx is clear and moist. No oropharyngeal exudate.   Eyes: Conjunctivae, EOM and lids are normal. Pupils are equal, round, and reactive to light. Right eye exhibits no discharge. Left eye exhibits no discharge. Right conjunctiva is not injected. Right conjunctiva has no hemorrhage. Left conjunctiva is not injected. Left conjunctiva has no hemorrhage. No scleral icterus.   Neck: Normal range of motion. Neck supple. No JVD present. No tracheal deviation present. No thyromegaly present.   Cardiovascular: Normal rate and regular rhythm.    Pulmonary/Chest: Effort normal. No stridor. No respiratory distress. She exhibits no tenderness.   Abdominal: Soft. She exhibits no distension and no mass. There is no  splenomegaly or hepatomegaly. There is no tenderness. There is no rebound.   Musculoskeletal: Normal range of motion. She exhibits no edema or tenderness.   Lymphadenopathy:     She has no cervical adenopathy.     She has no axillary adenopathy.        Right: No supraclavicular adenopathy present.        Left: No supraclavicular adenopathy present.   Neurological: She is alert and oriented to person, place, and time. No cranial nerve deficit. Coordination normal.   Skin: Skin is dry. No rash noted. She is not diaphoretic. No erythema.   Psychiatric: Her behavior is normal. Judgment and thought content normal. Her mood appears anxious. She exhibits a depressed mood.   Vitals reviewed.          Assessment:      1. Anemia of chronic renal failure, stage 3 (moderate)    2. CKD (chronic kidney disease) stage 3, GFR 30-59 ml/min           Plan:   Anemia secondary to chronic renal failure patient's hemoglobin is below 10 g will treat with Procrit 20,000 units every 2 weeks return in 6 weeks to see primary hematologist oncologist with CBC CMP iron status for Procrit consideration

## 2017-10-06 NOTE — PATIENT INSTRUCTIONS
[facility]  [address]  [phone] phone     [phone] fax  Hours of Operation: Monday- Friday 8:00am- 5:00pm  After hours phone  [phone]  Hematology / Oncology Physicians on call      Dr. Fernando Napoles                        Please call with any concerns regarding your appointment today.

## 2017-10-20 ENCOUNTER — INFUSION (OUTPATIENT)
Dept: INFUSION THERAPY | Facility: HOSPITAL | Age: 82
End: 2017-10-20
Attending: INTERNAL MEDICINE
Payer: COMMERCIAL

## 2017-10-20 ENCOUNTER — TELEPHONE (OUTPATIENT)
Dept: INFUSION THERAPY | Facility: HOSPITAL | Age: 82
End: 2017-10-20

## 2017-10-20 VITALS
SYSTOLIC BLOOD PRESSURE: 137 MMHG | BODY MASS INDEX: 24.96 KG/M2 | HEART RATE: 93 BPM | HEIGHT: 63 IN | TEMPERATURE: 97 F | OXYGEN SATURATION: 99 % | WEIGHT: 140.88 LBS | RESPIRATION RATE: 16 BRPM | DIASTOLIC BLOOD PRESSURE: 72 MMHG

## 2017-10-20 DIAGNOSIS — D63.1 ANEMIA OF CHRONIC RENAL FAILURE, STAGE 3 (MODERATE): Primary | ICD-10-CM

## 2017-10-20 DIAGNOSIS — N18.30 ANEMIA OF CHRONIC RENAL FAILURE, STAGE 3 (MODERATE): Primary | ICD-10-CM

## 2017-10-20 PROCEDURE — 96372 THER/PROPH/DIAG INJ SC/IM: CPT | Mod: PO

## 2017-10-20 PROCEDURE — 63600175 PHARM REV CODE 636 W HCPCS: Mod: PO | Performed by: INTERNAL MEDICINE

## 2017-10-20 RX ADMIN — ERYTHROPOIETIN 20000 UNITS: 20000 INJECTION, SOLUTION INTRAVENOUS; SUBCUTANEOUS at 03:10

## 2017-11-02 ENCOUNTER — INFUSION (OUTPATIENT)
Dept: INFUSION THERAPY | Facility: HOSPITAL | Age: 82
End: 2017-11-02
Attending: INTERNAL MEDICINE
Payer: COMMERCIAL

## 2017-11-02 VITALS — HEART RATE: 97 BPM | SYSTOLIC BLOOD PRESSURE: 124 MMHG | TEMPERATURE: 99 F | DIASTOLIC BLOOD PRESSURE: 82 MMHG

## 2017-11-02 DIAGNOSIS — D63.1 ANEMIA OF CHRONIC RENAL FAILURE, STAGE 3 (MODERATE): Primary | ICD-10-CM

## 2017-11-02 DIAGNOSIS — N18.30 ANEMIA OF CHRONIC RENAL FAILURE, STAGE 3 (MODERATE): Primary | ICD-10-CM

## 2017-11-02 PROCEDURE — 96372 THER/PROPH/DIAG INJ SC/IM: CPT | Mod: PO

## 2017-11-02 PROCEDURE — 63600175 PHARM REV CODE 636 W HCPCS: Mod: PO | Performed by: INTERNAL MEDICINE

## 2017-11-02 RX ADMIN — ERYTHROPOIETIN 20000 UNITS: 20000 INJECTION, SOLUTION INTRAVENOUS; SUBCUTANEOUS at 10:11

## 2017-11-02 NOTE — PATIENT INSTRUCTIONS
[facility]  [address]  [phone] phone     [phone] fax  Hours of Operation: Monday- Friday 8:00am- 5:00pm  After hours phone  [phone]  Hematology / Oncology Physicians on call      Dr. Fernando Napoles                        Please call with any concerns regarding your appointment today.

## 2017-11-09 DIAGNOSIS — L98.9 DERMATOSIS: ICD-10-CM

## 2017-11-09 RX ORDER — TRIAMCINOLONE ACETONIDE 1 MG/G
OINTMENT TOPICAL
Qty: 454 G | Refills: 1 | Status: SHIPPED | OUTPATIENT
Start: 2017-11-09 | End: 2018-06-15 | Stop reason: ALTCHOICE

## 2017-11-13 ENCOUNTER — INFUSION (OUTPATIENT)
Dept: INFUSION THERAPY | Facility: HOSPITAL | Age: 82
End: 2017-11-13
Attending: INTERNAL MEDICINE
Payer: COMMERCIAL

## 2017-11-13 ENCOUNTER — LAB VISIT (OUTPATIENT)
Dept: LAB | Facility: HOSPITAL | Age: 82
End: 2017-11-13
Attending: INTERNAL MEDICINE
Payer: COMMERCIAL

## 2017-11-13 ENCOUNTER — OFFICE VISIT (OUTPATIENT)
Dept: HEMATOLOGY/ONCOLOGY | Facility: CLINIC | Age: 82
End: 2017-11-13
Payer: COMMERCIAL

## 2017-11-13 VITALS — HEIGHT: 63 IN | WEIGHT: 140.88 LBS | BODY MASS INDEX: 24.96 KG/M2

## 2017-11-13 VITALS
HEART RATE: 104 BPM | SYSTOLIC BLOOD PRESSURE: 140 MMHG | BODY MASS INDEX: 24.96 KG/M2 | RESPIRATION RATE: 18 BRPM | DIASTOLIC BLOOD PRESSURE: 86 MMHG | HEIGHT: 63 IN | OXYGEN SATURATION: 98 % | TEMPERATURE: 98 F | WEIGHT: 140.88 LBS

## 2017-11-13 DIAGNOSIS — D63.1 ANEMIA OF CHRONIC RENAL FAILURE, STAGE 3 (MODERATE): Primary | ICD-10-CM

## 2017-11-13 DIAGNOSIS — N18.30 ANEMIA OF CHRONIC RENAL FAILURE, STAGE 3 (MODERATE): ICD-10-CM

## 2017-11-13 DIAGNOSIS — N18.4 ANEMIA OF CHRONIC RENAL FAILURE, STAGE 4 (SEVERE): Primary | ICD-10-CM

## 2017-11-13 DIAGNOSIS — D63.1 ANEMIA OF CHRONIC RENAL FAILURE, STAGE 4 (SEVERE): Primary | ICD-10-CM

## 2017-11-13 DIAGNOSIS — D63.1 ANEMIA OF CHRONIC RENAL FAILURE, STAGE 3 (MODERATE): ICD-10-CM

## 2017-11-13 DIAGNOSIS — N18.30 ANEMIA OF CHRONIC RENAL FAILURE, STAGE 3 (MODERATE): Primary | ICD-10-CM

## 2017-11-13 LAB
ALBUMIN SERPL BCP-MCNC: 2.7 G/DL
ALP SERPL-CCNC: 66 U/L
ALT SERPL W/O P-5'-P-CCNC: 13 U/L
ANION GAP SERPL CALC-SCNC: 12 MMOL/L
AST SERPL-CCNC: 26 U/L
BASOPHILS # BLD AUTO: 0.03 K/UL
BASOPHILS NFR BLD: 0.6 %
BILIRUB SERPL-MCNC: 0.3 MG/DL
BUN SERPL-MCNC: 68 MG/DL
CALCIUM SERPL-MCNC: 9 MG/DL
CHLORIDE SERPL-SCNC: 105 MMOL/L
CO2 SERPL-SCNC: 21 MMOL/L
CREAT SERPL-MCNC: 4.9 MG/DL
DIFFERENTIAL METHOD: ABNORMAL
EOSINOPHIL # BLD AUTO: 0.8 K/UL
EOSINOPHIL NFR BLD: 13.8 %
ERYTHROCYTE [DISTWIDTH] IN BLOOD BY AUTOMATED COUNT: 15.7 %
EST. GFR  (AFRICAN AMERICAN): 9 ML/MIN/1.73 M^2
EST. GFR  (NON AFRICAN AMERICAN): 7 ML/MIN/1.73 M^2
FERRITIN SERPL-MCNC: 147 NG/ML
GLUCOSE SERPL-MCNC: 92 MG/DL
HCT VFR BLD AUTO: 29.1 %
HGB BLD-MCNC: 9.7 G/DL
IRON SERPL-MCNC: 30 UG/DL
LYMPHOCYTES # BLD AUTO: 1 K/UL
LYMPHOCYTES NFR BLD: 18.2 %
MCH RBC QN AUTO: 26.5 PG
MCHC RBC AUTO-ENTMCNC: 33.3 G/DL
MCV RBC AUTO: 80 FL
MONOCYTES # BLD AUTO: 1.1 K/UL
MONOCYTES NFR BLD: 20.2 %
NEUTROPHILS # BLD AUTO: 2.6 K/UL
NEUTROPHILS NFR BLD: 47.2 %
PLATELET # BLD AUTO: 271 K/UL
PMV BLD AUTO: 9.3 FL
POTASSIUM SERPL-SCNC: 5.1 MMOL/L
PROT SERPL-MCNC: 8.5 G/DL
RBC # BLD AUTO: 3.66 M/UL
SATURATED IRON: 14 %
SODIUM SERPL-SCNC: 138 MMOL/L
TOTAL IRON BINDING CAPACITY: 216 UG/DL
TRANSFERRIN SERPL-MCNC: 146 MG/DL
WBC # BLD AUTO: 5.44 K/UL

## 2017-11-13 PROCEDURE — 83540 ASSAY OF IRON: CPT

## 2017-11-13 PROCEDURE — 96372 THER/PROPH/DIAG INJ SC/IM: CPT | Mod: PO

## 2017-11-13 PROCEDURE — 80053 COMPREHEN METABOLIC PANEL: CPT | Mod: PO

## 2017-11-13 PROCEDURE — 36415 COLL VENOUS BLD VENIPUNCTURE: CPT | Mod: PO

## 2017-11-13 PROCEDURE — 63600175 PHARM REV CODE 636 W HCPCS: Mod: PO | Performed by: INTERNAL MEDICINE

## 2017-11-13 PROCEDURE — 99214 OFFICE O/P EST MOD 30 MIN: CPT | Mod: 25,S$GLB,, | Performed by: INTERNAL MEDICINE

## 2017-11-13 PROCEDURE — 99999 PR PBB SHADOW E&M-EST. PATIENT-LVL IV: CPT | Mod: PBBFAC,,, | Performed by: INTERNAL MEDICINE

## 2017-11-13 PROCEDURE — 82728 ASSAY OF FERRITIN: CPT

## 2017-11-13 PROCEDURE — 85025 COMPLETE CBC W/AUTO DIFF WBC: CPT | Mod: PO

## 2017-11-13 RX ADMIN — ERYTHROPOIETIN 40000 UNITS: 40000 INJECTION, SOLUTION INTRAVENOUS; SUBCUTANEOUS at 12:11

## 2017-11-13 NOTE — PATIENT INSTRUCTIONS
[facility]  [address]  [phone] phone     [phone] fax  Hours of Operation: Monday- Friday 8:00am- 5:00pm  After hours phone  [phone]  Hematology / Oncology Physicians on call      Dr. Fernando Napoles                        Please call with any concerns regarding your appointment today.

## 2017-11-13 NOTE — PROGRESS NOTES
Subjective:       Patient ID: Gloria Sanz is a 86 y.o. female.    Chief Complaint: Follow-up    HPI This 84 year old AA lady comes for follow uo her anemia associated to chronic renal failure.  She is known to us because in MAy 2010 had a cbc that showed a hemoglobin of 10.6 with an MCV of 79.4. White cell count was 4,380 (differential included 17% monocytes) and a platelet count of 217,000 Review of information in the computer indicated that the patient has had mild anemia dating back a number of years\.    In 1991, her hemoglobin was 11.7 with an MCV of 84. Her MCV through the years has been anywhere from 79-84. The patient has had several iron studies that showed an elevated ferritin, a creatinine of 1.7 as well as serum protein electrophoresis that has failed to show a monoclonal spike.    . B12 levels have been normal.    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    The patient comes today for routine follow up.    At the end dec 2011 she had a severe epistaxis requiring a blood transfusion. She ended up having surgery for nasal polyps In May 2012 she ha had a drop of hr HGB to 8.8 and her creatinine was 1.8 She slowly improved ion her own. SPEp showed a polyclonal gammopathy and her free light chains showed elevations of both the kappa and lambda light chains which goes against clonality.       She had the zoster vaccine in 2008    She is on procrit every 2 weeks when needed    She is know to have a sub-centimeter pulmonary nodule in the right middle lobe, Ct scan in December 2015 showed stability.A CT scan done 3/1027 showed a new nodule, measuring 4.6 mm in the right lung.  She is scheduled to have it repeated in a year ng A multinodular thyroid was found and an US was suggested.The Us showed the same.       She has recurrent epistaxis which is felt to be due  to HBP     She comes for follow up to see if she will need procrit.     ALLERGIES: See med card  MEDICATIONS: See MedCard.  PREVIOUS SURGERIES: Thyroid nodule removed many years ago and endometrial  polyp removed in 2009.  Nasal polyps surgery around april 2012  SOCIAL HISTORY: She is  with three children. She lives in [place].  She does not smoke or drink. 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  FAMILY HISTORY: Two sisters have liver cancer, and another one has breast  cancer. Mother had cancer of the gallbladder. Father had a heart attack.  No diabetes in the family.  PAST MEDICAL HISTORY:  1. 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  2. Alpha globin gene rearrangement test showed that she is an alpha  thalassemia carrier.  3. Chronic anemia.  4-elevated creatinine  5. Hypertension.  6. Status post removal of endometrial polyp.  7. Epistaxis-recurrent felt to be due to HBP.  8. hx of epistaxis Dec 2011 ( nasal polyps  Review of Systems   Constitutional: Negative.  Negative for appetite change and unexpected weight change.   HENT: Negative.    Eyes: Negative.  Negative for visual disturbance.   Respiratory: Negative.  Negative for cough and wheezing.    Cardiovascular: Negative.  Negative for chest pain.   Gastrointestinal: Negative.  Negative for abdominal pain and diarrhea.   Genitourinary: Negative.    Musculoskeletal: Negative for back pain.   Skin: Negative for rash.   Neurological: Negative.  Negative for headaches.   Hematological: Negative for adenopathy.   Psychiatric/Behavioral: Negative.  The patient is not nervous/anxious.        Objective:      Physical Exam   Constitutional: She is oriented to person, place, and time. She appears well-developed. No distress.   HENT:   Head: Normocephalic.   Right Ear: Tympanic membrane, external ear and ear canal normal.   Left Ear: Tympanic membrane, external ear and ear canal normal.   Nose: Nose  normal. Right sinus exhibits no maxillary sinus tenderness and no frontal sinus tenderness. Left sinus exhibits no maxillary sinus tenderness and no frontal sinus tenderness.   Mouth/Throat: Oropharynx is clear and moist and mucous membranes are normal.   Teeth normal.  Gums normal.   Eyes: Conjunctivae and lids are normal. Pupils are equal, round, and reactive to light.   Neck: Normal carotid pulses, no hepatojugular reflux and no JVD present. Carotid bruit is not present. No tracheal deviation present. No thyroid mass and no thyromegaly present.   Cardiovascular: Normal rate, regular rhythm, S1 normal, S2 normal, normal heart sounds and intact distal pulses.  Exam reveals no gallop and no friction rub.    No murmur heard.  Carotid exam normal   Pulmonary/Chest: Effort normal and breath sounds normal. No accessory muscle usage. No respiratory distress. She has no wheezes. She has no rales. She exhibits no tenderness.   Abdominal: Soft. Normal appearance. She exhibits no distension and no mass. There is no splenomegaly or hepatomegaly. There is no tenderness. There is no rebound and no guarding.   Musculoskeletal: Normal range of motion. She exhibits no edema or tenderness.        Right hand: Normal.        Left hand: Normal.       Lymphadenopathy:     She has no cervical adenopathy.     She has no axillary adenopathy.        Right: No inguinal and no supraclavicular adenopathy present.        Left: No inguinal and no supraclavicular adenopathy present.   Neurological: She is alert and oriented to person, place, and time. She has normal strength. No cranial nerve deficit. Coordination normal.   Skin: Skin is warm and dry. No rash noted. She is not diaphoretic. No cyanosis or erythema. No pallor. Nails show no clubbing.   Psychiatric: She has a normal mood and affect. Her behavior is normal. Judgment and thought content normal.       Wt Readings from Last 3 Encounters:   11/13/17 63.9 kg (140 lb 14 oz)   11/13/17  63.9 kg (140 lb 14 oz)   10/20/17 63.9 kg (140 lb 14 oz)     Temp Readings from Last 3 Encounters:   11/13/17 98.1 °F (36.7 °C) (Oral)   11/02/17 99 °F (37.2 °C)   10/20/17 97 °F (36.1 °C)     BP Readings from Last 3 Encounters:   11/13/17 (!) 140/86   11/02/17 124/82   10/20/17 137/72     Pulse Readings from Last 3 Encounters:   11/13/17 104   11/02/17 97   10/20/17 93       Assessment:       1. Anemia of chronic renal failure, stage 4 (severe)        Plan:       Lab Results   Component Value Date    WBC 5.44 11/13/2017    HGB 9.7 (L) 11/13/2017    HCT 29.1 (L) 11/13/2017    MCV 80 (L) 11/13/2017     11/13/2017       Lab Results   Component Value Date    CREATININE 4.9 (H) 11/13/2017       We will increase the Zprocrit to 40,000 units every 2 weeks. See me in 4 weeks with a cbc  Lab Results   Component Value Date    FERRITIN 225 12/20/2016

## 2017-11-27 ENCOUNTER — TELEPHONE (OUTPATIENT)
Dept: INFUSION THERAPY | Facility: HOSPITAL | Age: 82
End: 2017-11-27

## 2017-11-27 NOTE — TELEPHONE ENCOUNTER
Attempted to contact patient about missed appointment. No answer. Voicemail left with call back number.

## 2017-11-30 ENCOUNTER — OFFICE VISIT (OUTPATIENT)
Dept: INTERNAL MEDICINE | Facility: CLINIC | Age: 82
End: 2017-11-30
Payer: COMMERCIAL

## 2017-11-30 VITALS
HEART RATE: 85 BPM | TEMPERATURE: 96 F | WEIGHT: 138.88 LBS | BODY MASS INDEX: 24.61 KG/M2 | DIASTOLIC BLOOD PRESSURE: 84 MMHG | SYSTOLIC BLOOD PRESSURE: 132 MMHG | HEIGHT: 63 IN

## 2017-11-30 DIAGNOSIS — N18.4 CKD (CHRONIC KIDNEY DISEASE) STAGE 4, GFR 15-29 ML/MIN: ICD-10-CM

## 2017-11-30 DIAGNOSIS — I10 ESSENTIAL HYPERTENSION: Primary | ICD-10-CM

## 2017-11-30 DIAGNOSIS — K59.00 CONSTIPATION, UNSPECIFIED CONSTIPATION TYPE: ICD-10-CM

## 2017-11-30 DIAGNOSIS — R79.89 LOW TSH LEVEL: ICD-10-CM

## 2017-11-30 PROCEDURE — 99999 PR PBB SHADOW E&M-EST. PATIENT-LVL III: CPT | Mod: PBBFAC,,, | Performed by: FAMILY MEDICINE

## 2017-11-30 PROCEDURE — 99213 OFFICE O/P EST LOW 20 MIN: CPT | Mod: S$GLB,,, | Performed by: FAMILY MEDICINE

## 2017-11-30 RX ORDER — AMOXICILLIN 250 MG
1 CAPSULE ORAL DAILY
COMMUNITY
End: 2017-12-27

## 2017-11-30 RX ORDER — POLYETHYLENE GLYCOL 3350 17 G/17G
POWDER, FOR SOLUTION ORAL
COMMUNITY
End: 2018-02-26 | Stop reason: SDUPTHER

## 2017-11-30 NOTE — PROGRESS NOTES
Subjective:       Patient ID: Gloria Sanz is a 86 y.o. female.    Chief Complaint: Hospital Follow Up    F/U:       Pt is a 86 year old who had constipation for about 14 days. Pt went to hospital and did not have illeus. Pt is doing fine at this point.      Review of Systems   Constitutional: Negative.    Respiratory: Negative.    Cardiovascular: Negative.    Genitourinary: Negative.    Musculoskeletal: Negative.    Neurological: Negative.    Psychiatric/Behavioral: Negative.        Objective:      Physical Exam   Constitutional: She is oriented to person, place, and time. She appears well-developed and well-nourished.   Cardiovascular: Normal rate and regular rhythm.    Pulmonary/Chest: Effort normal and breath sounds normal.   Abdominal: Soft. Bowel sounds are normal.   Neurological: She is alert and oriented to person, place, and time.   Skin: Skin is warm and dry.       Assessment:       1. Essential hypertension    2. CKD (chronic kidney disease) stage 4, GFR 15-29 ml/min    3. Constipation, unspecified constipation type    4. Low TSH level        Plan:       Essential hypertension  Comments:  BP is very well controlled    CKD (chronic kidney disease) stage 4, GFR 15-29 ml/min  Comments:  Will repeat CMP on the 12th  Orders:  -     Comprehensive metabolic panel; Future; Expected date: 12/12/2017    Constipation, unspecified constipation type  Comments:  1) Water, fiber. 2) Continue mirilax and sennokot.     Low TSH level  Comments:  Will repeat the thryoid panel  Orders:  -     T4, free; Future; Expected date: 12/12/2017  -     TSH; Future; Expected date: 12/12/2017

## 2017-12-05 DIAGNOSIS — E87.20 ACIDOSIS, METABOLIC: ICD-10-CM

## 2017-12-06 RX ORDER — SODIUM BICARBONATE 650 MG/1
TABLET ORAL
Qty: 90 TABLET | Refills: 2 | Status: SHIPPED | OUTPATIENT
Start: 2017-12-06 | End: 2017-12-27

## 2017-12-19 ENCOUNTER — TELEPHONE (OUTPATIENT)
Dept: HEMATOLOGY/ONCOLOGY | Facility: CLINIC | Age: 82
End: 2017-12-19

## 2017-12-19 NOTE — TELEPHONE ENCOUNTER
----- Message from Denise Mcnair sent at 12/19/2017  7:51 AM CST -----  Contact: pt   Pt wants to know why appt was canceled ,,, please call pt back at [phone]

## 2017-12-19 NOTE — TELEPHONE ENCOUNTER
Spoke with patient.  Advised her of Dr Clark's reason for book out and confirmed her next appointment with Megha Mondragon

## 2017-12-27 ENCOUNTER — OFFICE VISIT (OUTPATIENT)
Dept: SURGERY | Facility: CLINIC | Age: 82
End: 2017-12-27
Payer: COMMERCIAL

## 2017-12-27 ENCOUNTER — LAB VISIT (OUTPATIENT)
Dept: LAB | Facility: HOSPITAL | Age: 82
End: 2017-12-27
Attending: INTERNAL MEDICINE
Payer: COMMERCIAL

## 2017-12-27 ENCOUNTER — INFUSION (OUTPATIENT)
Dept: INFUSION THERAPY | Facility: HOSPITAL | Age: 82
End: 2017-12-27
Attending: INTERNAL MEDICINE
Payer: COMMERCIAL

## 2017-12-27 ENCOUNTER — OFFICE VISIT (OUTPATIENT)
Dept: PODIATRY | Facility: CLINIC | Age: 82
End: 2017-12-27
Payer: COMMERCIAL

## 2017-12-27 VITALS
RESPIRATION RATE: 18 BRPM | WEIGHT: 134.94 LBS | SYSTOLIC BLOOD PRESSURE: 138 MMHG | HEART RATE: 98 BPM | DIASTOLIC BLOOD PRESSURE: 78 MMHG | BODY MASS INDEX: 24.83 KG/M2 | HEIGHT: 62 IN | OXYGEN SATURATION: 99 %

## 2017-12-27 VITALS
BODY MASS INDEX: 25.4 KG/M2 | HEIGHT: 62 IN | DIASTOLIC BLOOD PRESSURE: 97 MMHG | HEART RATE: 108 BPM | SYSTOLIC BLOOD PRESSURE: 158 MMHG | WEIGHT: 138 LBS

## 2017-12-27 DIAGNOSIS — N18.4 ANEMIA OF CHRONIC RENAL FAILURE, STAGE 4 (SEVERE): ICD-10-CM

## 2017-12-27 DIAGNOSIS — D63.1 ANEMIA OF CHRONIC RENAL FAILURE, STAGE 3 (MODERATE): Primary | ICD-10-CM

## 2017-12-27 DIAGNOSIS — D57.3 SICKLE CELL TRAIT: ICD-10-CM

## 2017-12-27 DIAGNOSIS — D63.1 ANEMIA OF CHRONIC RENAL FAILURE, STAGE 4 (SEVERE): ICD-10-CM

## 2017-12-27 DIAGNOSIS — L84 CORN OR CALLUS: ICD-10-CM

## 2017-12-27 DIAGNOSIS — G60.9 IDIOPATHIC PERIPHERAL NEUROPATHY: ICD-10-CM

## 2017-12-27 DIAGNOSIS — N18.4 CKD (CHRONIC KIDNEY DISEASE) STAGE 4, GFR 15-29 ML/MIN: ICD-10-CM

## 2017-12-27 DIAGNOSIS — D56.3 ALPHA THALASSEMIA SILENT CARRIER: ICD-10-CM

## 2017-12-27 DIAGNOSIS — N18.30 ANEMIA OF CHRONIC RENAL FAILURE, STAGE 3 (MODERATE): Primary | ICD-10-CM

## 2017-12-27 DIAGNOSIS — N18.4 CKD (CHRONIC KIDNEY DISEASE) STAGE 4, GFR 15-29 ML/MIN: Primary | ICD-10-CM

## 2017-12-27 DIAGNOSIS — R04.0 EPISTAXIS, RECURRENT: ICD-10-CM

## 2017-12-27 DIAGNOSIS — B35.1 DERMATOPHYTOSIS OF NAIL: Primary | ICD-10-CM

## 2017-12-27 LAB
BASOPHILS # BLD AUTO: 0.02 K/UL
BASOPHILS NFR BLD: 0.4 %
DIFFERENTIAL METHOD: ABNORMAL
EOSINOPHIL # BLD AUTO: 0.3 K/UL
EOSINOPHIL NFR BLD: 5.2 %
ERYTHROCYTE [DISTWIDTH] IN BLOOD BY AUTOMATED COUNT: 17.5 %
HCT VFR BLD AUTO: 27.3 %
HGB BLD-MCNC: 9.2 G/DL
LYMPHOCYTES # BLD AUTO: 1.4 K/UL
LYMPHOCYTES NFR BLD: 29.2 %
MCH RBC QN AUTO: 25.6 PG
MCHC RBC AUTO-ENTMCNC: 33.7 G/DL
MCV RBC AUTO: 76 FL
MONOCYTES # BLD AUTO: 1 K/UL
MONOCYTES NFR BLD: 19.9 %
NEUTROPHILS # BLD AUTO: 2.2 K/UL
NEUTROPHILS NFR BLD: 45.3 %
PLATELET # BLD AUTO: 178 K/UL
PMV BLD AUTO: 10.3 FL
RBC # BLD AUTO: 3.6 M/UL
WBC # BLD AUTO: 4.83 K/UL

## 2017-12-27 PROCEDURE — 96372 THER/PROPH/DIAG INJ SC/IM: CPT | Mod: PO

## 2017-12-27 PROCEDURE — 11721 DEBRIDE NAIL 6 OR MORE: CPT | Mod: 59,S$GLB,, | Performed by: PODIATRIST

## 2017-12-27 PROCEDURE — 85025 COMPLETE CBC W/AUTO DIFF WBC: CPT | Mod: PO

## 2017-12-27 PROCEDURE — 63600175 PHARM REV CODE 636 W HCPCS: Mod: PO | Performed by: NURSE PRACTITIONER

## 2017-12-27 PROCEDURE — 99999 PR PBB SHADOW E&M-EST. PATIENT-LVL IV: CPT | Mod: PBBFAC,,, | Performed by: PODIATRIST

## 2017-12-27 PROCEDURE — 36415 COLL VENOUS BLD VENIPUNCTURE: CPT | Mod: PO

## 2017-12-27 PROCEDURE — 99499 UNLISTED E&M SERVICE: CPT | Mod: S$GLB,,, | Performed by: PODIATRIST

## 2017-12-27 PROCEDURE — 11056 PARNG/CUTG B9 HYPRKR LES 2-4: CPT | Mod: S$GLB,,, | Performed by: PODIATRIST

## 2017-12-27 PROCEDURE — 99999 PR PBB SHADOW E&M-EST. PATIENT-LVL IV: CPT | Mod: PBBFAC,,, | Performed by: NURSE PRACTITIONER

## 2017-12-27 PROCEDURE — 99214 OFFICE O/P EST MOD 30 MIN: CPT | Mod: 25,S$GLB,, | Performed by: NURSE PRACTITIONER

## 2017-12-27 RX ORDER — AMOXICILLIN 250 MG
1 CAPSULE ORAL
COMMUNITY
Start: 2017-11-27 | End: 2018-02-07

## 2017-12-27 RX ORDER — CLONIDINE HYDROCHLORIDE 0.3 MG/1
0.3 TABLET ORAL
COMMUNITY
End: 2017-12-27

## 2017-12-27 RX ORDER — HYDRALAZINE HYDROCHLORIDE 50 MG/1
50 TABLET, FILM COATED ORAL
COMMUNITY

## 2017-12-27 RX ADMIN — ERYTHROPOIETIN 40000 UNITS: 40000 INJECTION, SOLUTION INTRAVENOUS; SUBCUTANEOUS at 10:12

## 2017-12-27 NOTE — PROGRESS NOTES
Subjective:       Patient ID: Gloria Sanz is a 86 y.o. female.    Chief Complaint: Anemia    HPI: 86 year old AA lady returns for follow up for her anemia associated to chronic renal failure.    Initially presented May 2010 with a cbc that showed a hemoglobin of 10.6 with an MCV of 79.4. White cell count was 4,380 (differential included 17% monocytes) and a platelet count of 217,000 Review of information in the computer indicated that the patient has had mild anemia dating back a number of years.      In 1991, her hemoglobin was 11.7 with an MCV of 84. Her MCV through the years has been anywhere from 79-84. Patient has had several iron studies that showed an elevated ferritin, a creatinine of 1.7 as well as serum protein electrophoresis that has failed to show a monoclonal spike.      B12 levels have been normal.      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      The patient comes today for routine follow up.      History of severe epistaxis Dec 2011 requiring a blood transfusion. She ended up having surgery for nasal polyps In May 2012 she had a drop of hr HGB to 8.8 and her creatinine was 1.8 She slowly improved on her own. SPEP showed a polyclonal gammopathy and her free light chains showed elevations of both the kappa and lambda light chains which goes against clonality.       She had the zoster vaccine in 2008    She is on procrit every 2 weeks when needed    She is know to have a sub-centimeter pulmonary nodule in the right middle lobe, Ct scan in December 2015 showed stability. CT scan done 3/2017 showed a new nodule, measuring 4.6 mm in the right lung.  She is scheduled to have it repeated in a year. A multinodular thyroid was found and an US was suggested.The Us showed the same.       She has recurrent epistaxis which is felt to be due to HBP     She comes for follow up to see if she will  need procrit.    Review of Systems   Constitutional: Positive for fatigue (with walking).        No episodes of bleeding.   HENT: Negative.    Eyes: Negative.    Respiratory: Negative.    Cardiovascular: Negative.    Gastrointestinal: Negative.         No reflux   Endocrine: Negative.    Genitourinary: Negative.    Musculoskeletal: Negative.    Skin: Negative.    Allergic/Immunologic: Negative.    Neurological: Negative.    Hematological: Negative.  Negative for adenopathy.   Psychiatric/Behavioral: Negative.        Objective:      Physical Exam   Constitutional: She appears well-developed and well-nourished.   HENT:   Head: Normocephalic and atraumatic.   Right Ear: External ear normal.   Left Ear: External ear normal.   Eyes: Conjunctivae and EOM are normal. Pupils are equal, round, and reactive to light. Right eye exhibits no discharge. Left eye exhibits no discharge. No scleral icterus.   Neck: Normal range of motion. Neck supple. No thyromegaly present.   Cardiovascular: Normal rate and regular rhythm.    Pulmonary/Chest: Effort normal and breath sounds normal.   Abdominal: Soft. Bowel sounds are normal.   Musculoskeletal: Normal range of motion.   Lymphadenopathy:     She has no cervical adenopathy.   Neurological: She is alert.   Skin: Skin is warm and dry.   Psychiatric: She has a normal mood and affect. Her behavior is normal. Judgment and thought content normal.       LABS:   Results for REYMUNDO GREGORY (MRN [id number]) as of 12/27/2017 10:21   Ref. Range 11/13/2017 11:18 12/27/2017 09:50   WBC Latest Ref Range: 3.90 - 12.70 K/uL 5.44 4.83   RBC Latest Ref Range: 4.00 - 5.40 M/uL 3.66 (L) 3.60 (L)   Hemoglobin Latest Ref Range: 12.0 - 16.0 g/dL 9.7 (L) 9.2 (L)   Hematocrit Latest Ref Range: 37.0 - 48.5 % 29.1 (L) 27.3 (L)   MCV Latest Ref Range: 82 - 98 fL 80 (L) 76 (L)   MCH Latest Ref Range: 27.0 - 31.0 pg 26.5 (L) 25.6 (L)   MCHC Latest Ref Range: 32.0 - 36.0 g/dL 33.3 33.7   RDW Latest Ref Range: 11.5 -  14.5 % 15.7 (H) 17.5 (H)   Platelets Latest Ref Range: 150 - 350 K/uL 271 178   MPV Latest Ref Range: 9.2 - 12.9 fL 9.3 10.3   Gran% Latest Ref Range: 38.0 - 73.0 % 47.2 45.3   Gran # Latest Ref Range: 1.8 - 7.7 K/uL 2.6 2.2   Lymph% Latest Ref Range: 18.0 - 48.0 % 18.2 29.2   Lymph # Latest Ref Range: 1.0 - 4.8 K/uL 1.0 1.4   Mono% Latest Ref Range: 4.0 - 15.0 % 20.2 (H) 19.9 (H)   Mono # Latest Ref Range: 0.3 - 1.0 K/uL 1.1 (H) 1.0   Eosinophil% Latest Ref Range: 0.0 - 8.0 % 13.8 (H) 5.2   Eos # Latest Ref Range: 0.0 - 0.5 K/uL 0.8 (H) 0.3   Basophil% Latest Ref Range: 0.0 - 1.9 % 0.6 0.4   Baso # Latest Ref Range: 0.00 - 0.20 K/uL 0.03 0.02     Assessment:       1. CKD (chronic kidney disease) stage 4, GFR 15-29 ml/min    2. Alpha thalassemia silent carrier    3. Sickle cell trait    4. Anemia of chronic renal failure, stage 4 (severe)    5. Epistaxis, recurrent        Plan:   1.     40,000 units every 14 days X 3 and rtc on 2/7/18 with cbc and me and possible procrit  2.      Pt to call if notes any weakness or changes prior to this follow-up appt. - risks vs benefits of procrit reviewed with pt - pt verbalizes understanding and agrees with recommendations.

## 2017-12-27 NOTE — PROGRESS NOTES
[facility]  PODIATRIC MEDICINE AND SURGERY  PROGRESS NOTE     CHIEF COMPLAINT  Chief Complaint   Patient presents with    Routine Foot Care     Last visit with PCP Dr. Desouza 11/30/17    [facility]    SUBJECTIVE: Gloria Sanz is a 86 y.o. female who  has a past medical history of Anemia; Anemia of chronic renal failure (5/12/2014); Asthma, chronic; Cataract; GERD (gastroesophageal reflux disease); Gout, arthritis; Helicobacter pylori gastritis; Hypertension; Osteoarthritis; Pulmonary HTN; and Renal insufficiency. Gloriapresents to clinic for high risk  foot exam and care.  Gloria admits numbness, burning, and/or tingling sensations in their feet. Patient admits to painful toenails aggravated by increased weight bearing, shoe gear, and pressure. States pain is relieved with routine debridements.    She also complains about painful bunion on right foot. She has been treated for this in the past but pain still persists.  Patient has no other pedal complaints at this time.    HgA1c:   Hemoglobin A1C   Date Value Ref Range Status   02/10/2011 5.4 4.0 - 6.2 % Final         PMH  Past Medical History:   Diagnosis Date    Anemia     sickle cell trait, alpha thalessemia    Anemia of chronic renal failure 5/12/2014    Asthma, chronic     Cataract     GERD (gastroesophageal reflux disease)     Gout, arthritis     Helicobacter pylori gastritis     Hypertension     Osteoarthritis     Pulmonary HTN     Renal insufficiency      Patient Active Problem List   Diagnosis    Hypertension    Helicobacter pylori gastritis    Osteoporosis, senile    HTN (hypertension)    Arthritis of knee    Acquired valgus deformity knee    Alpha thalassemia silent carrier    Sickle cell trait    Anemia of chronic renal failure    Proteinuria    Acute conjunctivitis of right eye    Kidney cysts    Diverticulosis    Gallstones    Rash    Sinusitis    Dehydration    TIA (transient ischemic  attack)    Hyperkalemia    Moderate persistent asthma without complication    Pulmonary nodule seen on imaging study    Malignant hypertension    Abnormal ECG    CKD (chronic kidney disease) stage 4, GFR 15-29 ml/min    Overactive bladder    Dysphagia    Nonrheumatic aortic valve stenosis    LVH (left ventricular hypertrophy)    Non-rheumatic tricuspid valve insufficiency       MEDS  Current Outpatient Prescriptions on File Prior to Visit   Medication Sig Dispense Refill    albuterol 90 mcg/actuation inhaler Inhale 2 puffs into the lungs every 6 (six) hours as needed for Wheezing. 1 each 11    ammonium lactate (AMLACTIN) 12 % lotion Use daily.  Apply to damp skin after bathing. 225 g 11    aspirin (ECOTRIN) 81 MG EC tablet Take 81 mg by mouth once daily.      B complex vitamins (B COMPLEX) TbSR Take by mouth. 1 Tablet Extended Release Oral Every day      CALCIUM CARBONATE/VITAMIN D3 (CALCIUM 500 + D, D3, ORAL) Take by mouth. 1 Tablet Oral Twice a day      cloNIDine (CATAPRES) 0.3 MG tablet take 1 tablet by mouth twice a day 60 tablet 3    diltiaZEM (CARDIZEM CD) 180 MG 24 hr capsule take 1 capsule by mouth once daily 30 capsule 11    fluticasone (FLONASE) 50 mcg/actuation nasal spray 2 sprays by Each Nare route daily as needed.  0    fluticasone-vilanterol (BREO) 100-25 mcg/dose diskus inhaler Inhale 1 puff into the lungs once daily. 60 each 11    ketoconazole (NIZORAL) 2 % shampoo Wash hair with medicated shampoo at least 2x/week - let sit on scalp at least 5 minutes prior to rinsing 120 mL 5    polyethylene glycol (GLYCOLAX) 17 gram PwPk Take by mouth.      triamcinolone acetonide 0.1% (KENALOG) 0.1 % ointment apply to affected area twice a day 454 g 1    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 3 nights/week 198.6 g 3    [DISCONTINUED] hydrALAZINE (APRESOLINE) 50 MG tablet take 1 tablet by mouth three times a day 90 tablet 11    [DISCONTINUED] senna-docusate 8.6-50 mg (PERICOLACE) 8.6-50 mg  "per tablet Take 1 tablet by mouth once daily.      [DISCONTINUED] sodium bicarbonate 650 MG tablet take 1 tablet by mouth three times a day 90 tablet 2     No current facility-administered medications on file prior to visit.        PSH     Past Surgical History:   Procedure Laterality Date    CHOLECYSTECTOMY      endometrial polyp      FETAL BLOOD TRANSFUSION  12/11/2015    2 pints    THYROID SURGERY          ALL  Review of patient's allergies indicates:   Allergen Reactions    Allopurinol analogues Rash and Other (See Comments)     Sores in mouth    Diflucan [fluconazole] Swelling    Ace inhibitors Nausea And Vomiting    Amlodipine Edema    Metronidazole Nausea And Vomiting    Phenytoin sodium extended Nausea And Vomiting       SOC     Social History   Substance Use Topics    Smoking status: Never Smoker    Smokeless tobacco: Never Used    Alcohol use No         Family HX    Family History   Problem Relation Age of Onset    Liver cancer Mother     Cancer Mother     Liver cancer Sister     Breast cancer Sister     Cancer Sister     Glaucoma Sister     Thyroid disease Sister     Melanoma Neg Hx     Eczema Neg Hx     Lupus Neg Hx     Psoriasis Neg Hx        PHYSICAL EXAM  Vitals:    12/27/17 1126   BP: (!) 158/97   Pulse: 108   Weight: 62.6 kg (138 lb 0.1 oz)   Height: 5' 2" (1.575 m)   PainSc: 0-No pain      REVIEW OF SYSTEMS  Review of Systems   Constitutional: Negative for chills and fever.   Musculoskeletal: Negative for joint pain.   Skin: Negative for itching.   Neurological: Negative for dizziness and tingling.       GEN:  This patient is well-developed, well-nourished and appears stated age, well-oriented to person, place and time, and cooperative and pleasant on today's visit.      LOWER EXTREMITY  Vascular:   · DP pedal pulse 2/4 b/l, PT pedal pulse 2/4 b/l  · Skin temperature warm to warm from prox to distally  · CFT <5 secs b/l  · There is no edema noted b/l.     Dermatologic: "   · Thickened, dystrophic, elongated toenails with subungal debris 1-5 b/l.   · No open skin lesions noted  · No erythema or drainage noted b/l.  · Webspaces are C/D/I B/L.  · Skin texture and turgor   · There is no pedal hair growth noted    Neurologic:  · Protective sensation absent at 0/10 sites upon examination with [place] Weinsten 5.07 g monofilament.   · Propioception intact at 1st MTPJ b/l.   · Babinski reflex absent b/l. Light touch and sharp/dull sensation intact b/l.    Musculoskeletal/Orthopedic:  · No symptomatic structural abnormalities noted.   · Bilateral HAV deformity with prominent medial eminence 1st metatarsal RIGHT with pain upon palpation  · There are semi rigid hammertoe deformities 2-5 b/l  · Muscle strength is 5/5 for foot inverters, everters, plantarflexors, and dorsiflexors. Muscle tone is normal.  · Pain free range of motion in all four quadrants with stiffness and limitation b/l  · Foot type: pronated with decreased medial longituidinal arch         ASSESSMENT  Encounter Diagnoses   Name Primary?    Dermatophytosis of nail Yes    Idiopathic peripheral neuropathy     CKD (chronic kidney disease) stage 4, GFR 15-29 ml/min     Corn or callus        PLAN  -patient was examined and evaluated    Dermatophytosis of nail    Idiopathic peripheral neuropathy    CKD (chronic kidney disease) stage 4, GFR 15-29 ml/min    Corn or callus      -Discuss presenting problems, etiology, pathologic processes and management options with patient today.   -I counseled the patient on their conditions, their implications and medical management. An in depth discussion on diabetic management, risk prevention, amputation prevention verbally and provided educational literature in written format.  -With patient's permission, nails were aggressively reduced and debrided x 10 to their soft tissue attachment mechanically and with electric , removing all offending nail and debris. Patient relates relief  following the procedure. Patient will continue to monitor the areas daily, inspect feet, wear protective shoe gear when ambulatory, moisturizer to maintain skin integrity.  -sharp excisional debridement of hyperkeratotic lesions deep to epidermal layer x 2 on right second digit and right 1st digit was performed with a #15 blade without incident.      Future Appointments  Date Time Provider Department Center   1/2/2018 10:30 AM Vladimir Antoine III, MD [facility]   1/10/2018 10:00 AM CHAIR 09 [facility]   1/24/2018 10:00 AM CHAIR 05 [facility]   2/7/2018 9:45 AM LABORATORY, [facility] LAB [facility]   2/7/2018 10:00 AM Megha Mondragon NP [facility] GENSUR [facility]   2/7/2018 10:30 AM CHAIR 01 [facility]   3/19/2018 9:10 AM [facility] CT1 LIMIT 500 LBS [facility] CT SCAN [facility]   3/19/2018 9:40 AM PULMONARY LAB, [facility] PULMLAB [facility]   3/19/2018 11:20 AM Parth Bennett MD [facility] PULMSVC [facility]   3/28/2018 11:00 AM Gi Cotto DPM [facility] POD [facility]

## 2017-12-27 NOTE — PATIENT INSTRUCTIONS
[facility]  [address]  [address] [address]  [phone] phone     [phone] fax  Hours of Operation: Monday- Friday 8:00am- 5:00pm  After hours phone  [phone]  Hematology / Oncology Physicians on call      Dr. Fernando Napoles                        Please call with any concerns regarding your appointment today.  FALL PREVENTION   Falls often occur due to slipping, tripping or losing your balance. Here are ways to reduce your risk of falling again.   Was there anything that caused your fall that can be fixed, removed or replaced?   Make your home safe by keeping walkways clear of objects you may trip over.   Use non-slip pads under rugs.   Do not walk in poorly lit areas.   Do not stand on chairs or wobbly ladders.   Use caution when reaching overhead or looking upward. This position can cause a loss of balance.   Be sure your shoes fit properly, have non-slip bottoms and are in good condition.   Be cautious when going up and down stairs, curbs, and when walking on uneven sidewalks.   If your balance is poor, consider using a cane or walker.   If your fall was related to alcohol use, stop or limit alcohol intake.   If your fall was related to use of sleeping medicines, talk to your doctor about this. You may need to reduce your dosage at bedtime if you awaken during the night to go to the bathroom.   To reduce the need for nighttime bathroom trips:   Avoid drinking fluids for several hours before going to bed   Empty your bladder before going to bed   Men can keep a urinal at the bedside   © 3143-4362 [employer], [address] [place] [address], [place] [address]. All rights reserved. This information is not intended as a substitute for professional medical care. Always follow your healthcare professional's instructions.

## 2018-01-11 ENCOUNTER — INFUSION (OUTPATIENT)
Dept: INFUSION THERAPY | Facility: HOSPITAL | Age: 83
End: 2018-01-11
Attending: INTERNAL MEDICINE
Payer: COMMERCIAL

## 2018-01-11 VITALS
RESPIRATION RATE: 18 BRPM | SYSTOLIC BLOOD PRESSURE: 124 MMHG | OXYGEN SATURATION: 99 % | DIASTOLIC BLOOD PRESSURE: 75 MMHG | TEMPERATURE: 98 F | HEART RATE: 85 BPM

## 2018-01-11 DIAGNOSIS — N18.30 ANEMIA OF CHRONIC RENAL FAILURE, STAGE 3 (MODERATE): Primary | ICD-10-CM

## 2018-01-11 DIAGNOSIS — D63.1 ANEMIA OF CHRONIC RENAL FAILURE, STAGE 3 (MODERATE): Primary | ICD-10-CM

## 2018-01-11 PROCEDURE — 63600175 PHARM REV CODE 636 W HCPCS: Mod: JG,PO | Performed by: INTERNAL MEDICINE

## 2018-01-11 PROCEDURE — 96372 THER/PROPH/DIAG INJ SC/IM: CPT | Mod: PO

## 2018-01-11 RX ADMIN — ERYTHROPOIETIN 40000 UNITS: 40000 INJECTION, SOLUTION INTRAVENOUS; SUBCUTANEOUS at 10:01

## 2018-01-11 NOTE — PATIENT INSTRUCTIONS
.  [facility]  [address]  [address] [address]  [phone] phone     [phone] fax  Hours of Operation: Monday- Friday 8:00am- 5:00pm  After hours phone  [phone]  Hematology / Oncology Physicians on call      Dr. Fernando Cristina    Please call with any concerns regarding your appointment today.    .FALL PREVENTION   Falls often occur due to slipping, tripping or losing your balance. Here are ways to reduce your risk of falling again.   Was there anything that caused your fall that can be fixed, removed or replaced?   Make your home safe by keeping walkways clear of objects you may trip over.   Use non-slip pads under rugs.   Do not walk in poorly lit areas.   Do not stand on chairs or wobbly ladders.   Use caution when reaching overhead or looking upward. This position can cause a loss of balance.   Be sure your shoes fit properly, have non-slip bottoms and are in good condition.   Be cautious when going up and down stairs, curbs, and when walking on uneven sidewalks.   If your balance is poor, consider using a cane or walker.   If your fall was related to alcohol use, stop or limit alcohol intake.   If your fall was related to use of sleeping medicines, talk to your doctor about this. You may need to reduce your dosage at bedtime if you awaken during the night to go to the bathroom.   To reduce the need for nighttime bathroom trips:   Avoid drinking fluids for several hours before going to bed   Empty your bladder before going to bed   Men can keep a urinal at the bedside   © 1375-4482 [employer], [address] [place] [address], [place] [address]. All rights reserved. This information is not intended as a substitute for professional medical care. Always follow your healthcare professional's instructions.

## 2018-01-15 ENCOUNTER — TELEPHONE (OUTPATIENT)
Dept: INFUSION THERAPY | Facility: HOSPITAL | Age: 83
End: 2018-01-15

## 2018-01-16 ENCOUNTER — TELEPHONE (OUTPATIENT)
Dept: INFUSION THERAPY | Facility: HOSPITAL | Age: 83
End: 2018-01-16

## 2018-01-19 RX ORDER — CLONIDINE HYDROCHLORIDE 0.3 MG/1
TABLET ORAL
Qty: 60 TABLET | Refills: 3 | Status: SHIPPED | OUTPATIENT
Start: 2018-01-19 | End: 2018-05-10 | Stop reason: SDUPTHER

## 2018-01-23 ENCOUNTER — INFUSION (OUTPATIENT)
Dept: INFUSION THERAPY | Facility: HOSPITAL | Age: 83
End: 2018-01-23
Attending: INTERNAL MEDICINE
Payer: COMMERCIAL

## 2018-01-23 VITALS — DIASTOLIC BLOOD PRESSURE: 86 MMHG | TEMPERATURE: 98 F | SYSTOLIC BLOOD PRESSURE: 155 MMHG | HEART RATE: 110 BPM

## 2018-01-23 DIAGNOSIS — N18.30 ANEMIA OF CHRONIC RENAL FAILURE, STAGE 3 (MODERATE): Primary | ICD-10-CM

## 2018-01-23 DIAGNOSIS — D63.1 ANEMIA OF CHRONIC RENAL FAILURE, STAGE 3 (MODERATE): Primary | ICD-10-CM

## 2018-01-23 PROCEDURE — 96372 THER/PROPH/DIAG INJ SC/IM: CPT | Mod: PO

## 2018-01-23 PROCEDURE — 63600175 PHARM REV CODE 636 W HCPCS: Mod: JG,PO | Performed by: INTERNAL MEDICINE

## 2018-01-23 RX ADMIN — ERYTHROPOIETIN 40000 UNITS: 40000 INJECTION, SOLUTION INTRAVENOUS; SUBCUTANEOUS at 10:01

## 2018-02-05 NOTE — PROGRESS NOTES
Subjective:       Patient ID: Gloria Sanz is a 86 y.o. female.    Chief Complaint: Anemia    HPI: 86 year old AA lady returns for follow up for her anemia associated to chronic renal failure.    Initially presented May 2010 with a cbc that showed a hemoglobin of 10.6 with an MCV of 79.4. White cell count was 4,380 (differential included 17% monocytes) and a platelet count of 217,000 Review of information in the computer indicated that the patient has had mild anemia dating back a number of years.      In 1991, her hemoglobin was 11.7 with an MCV of 84. Her MCV through the years has been anywhere from 79-84. Patient has had several iron studies that showed an elevated ferritin, a creatinine of 1.7 as well as serum protein electrophoresis that has failed to show a monoclonal spike.      B12 levels have been normal.      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      The patient comes today for routine follow up.      History of severe epistaxis Dec 2011 requiring a blood transfusion. She ended up having surgery for nasal polyps In May 2012 she had a drop of hr HGB to 8.8 and her creatinine was 1.8 She slowly improved on her own. SPEP showed a polyclonal gammopathy and her free light chains showed elevations of both the kappa and lambda light chains which goes against clonality.       She had the zoster vaccine in 2008    She is on procrit every 2 weeks when needed    She is know to have a sub-centimeter pulmonary nodule in the right middle lobe, Ct scan in December 2015 showed stability. CT scan done 3/2017 showed a new nodule, measuring 4.6 mm in the right lung.  She is scheduled to have it repeated in a year. A multinodular thyroid was found and an US was suggested.The Us showed the same.       She has recurrent epistaxis which is felt to be due to HBP    Last procrit injection was 1/23/18     She  comes for follow up to see if she will need procrit.    Review of Systems   Constitutional: Positive for fatigue (with walking).        No episodes of bleeding.   HENT: Negative.    Eyes: Negative.    Respiratory: Negative.    Cardiovascular: Negative.    Gastrointestinal: Negative.         No reflux   Endocrine: Negative.    Genitourinary: Negative.    Musculoskeletal: Negative.    Skin: Negative.    Allergic/Immunologic: Negative.    Neurological: Negative.    Hematological: Negative.  Negative for adenopathy.   Psychiatric/Behavioral: Negative.        Objective:      Physical Exam   Constitutional: She appears well-developed and well-nourished.   HENT:   Head: Normocephalic and atraumatic.   Right Ear: External ear normal.   Left Ear: External ear normal.   Eyes: Conjunctivae and EOM are normal. Pupils are equal, round, and reactive to light. Right eye exhibits no discharge. Left eye exhibits no discharge. No scleral icterus.   Neck: Normal range of motion. Neck supple. No thyromegaly present.   Cardiovascular: Normal rate and regular rhythm.    Pulmonary/Chest: Effort normal and breath sounds normal.   Abdominal: Soft. Bowel sounds are normal.   Musculoskeletal: Normal range of motion.   Lymphadenopathy:     She has no cervical adenopathy.   Neurological: She is alert.   Skin: Skin is warm and dry.   Psychiatric: She has a normal mood and affect. Her behavior is normal. Judgment and thought content normal.       LABS:   Results for REYMUNDO GREGORY (MRN [id number]) as of 2/7/2018 10:42   Ref. Range 2/7/2018 10:20   WBC Latest Ref Range: 3.90 - 12.70 K/uL 4.92   RBC Latest Ref Range: 4.00 - 5.40 M/uL 4.03   Hemoglobin Latest Ref Range: 12.0 - 16.0 g/dL 10.4 (L)   Hematocrit Latest Ref Range: 37.0 - 48.5 % 31.6 (L)   MCV Latest Ref Range: 82 - 98 fL 78 (L)   MCH Latest Ref Range: 27.0 - 31.0 pg 25.8 (L)   MCHC Latest Ref Range: 32.0 - 36.0 g/dL 32.9   RDW Latest Ref Range: 11.5 - 14.5 % 20.0 (H)   Platelets Latest  Ref Range: 150 - 350 K/uL 202   MPV Latest Ref Range: 9.2 - 12.9 fL 10.0   Gran% Latest Ref Range: 38.0 - 73.0 % 45.5   Gran # (ANC) Latest Ref Range: 1.8 - 7.7 K/uL 2.2   Lymph% Latest Ref Range: 18.0 - 48.0 % 38.2   Lymph # Latest Ref Range: 1.0 - 4.8 K/uL 1.9   Mono% Latest Ref Range: 4.0 - 15.0 % 12.6   Mono # Latest Ref Range: 0.3 - 1.0 K/uL 0.6   Eosinophil% Latest Ref Range: 0.0 - 8.0 % 3.3   Eos # Latest Ref Range: 0.0 - 0.5 K/uL 0.2   Basophil% Latest Ref Range: 0.0 - 1.9 % 0.4   Baso # Latest Ref Range: 0.00 - 0.20 K/uL 0.02       Assessment:       1. CKD (chronic kidney disease) stage 4, GFR 15-29 ml/min    2. Alpha thalassemia silent carrier    3. Sickle cell trait    4. Anemia of chronic renal failure, stage 4 (severe)        Plan:   1.    Hold Procrit 40,000 units every 14 days  and rtc on 3/7/18 with cbc, iron, tibc and ferritin and me and possible procrit  2.    Pt to call if notes any weakness or changes prior to this follow-up appt. - risks vs benefits of procrit reviewed with pt - pt verbalizes understanding and agrees with recommendations.

## 2018-02-07 ENCOUNTER — LAB VISIT (OUTPATIENT)
Dept: LAB | Facility: HOSPITAL | Age: 83
End: 2018-02-07
Attending: NURSE PRACTITIONER
Payer: COMMERCIAL

## 2018-02-07 ENCOUNTER — OFFICE VISIT (OUTPATIENT)
Dept: HEMATOLOGY/ONCOLOGY | Facility: CLINIC | Age: 83
End: 2018-02-07
Payer: COMMERCIAL

## 2018-02-07 VITALS
OXYGEN SATURATION: 100 % | DIASTOLIC BLOOD PRESSURE: 62 MMHG | WEIGHT: 135.81 LBS | HEIGHT: 63 IN | BODY MASS INDEX: 24.06 KG/M2 | SYSTOLIC BLOOD PRESSURE: 132 MMHG | RESPIRATION RATE: 16 BRPM | HEART RATE: 85 BPM

## 2018-02-07 DIAGNOSIS — N18.4 CKD (CHRONIC KIDNEY DISEASE) STAGE 4, GFR 15-29 ML/MIN: Primary | ICD-10-CM

## 2018-02-07 DIAGNOSIS — R04.0 EPISTAXIS, RECURRENT: ICD-10-CM

## 2018-02-07 DIAGNOSIS — D63.1 ANEMIA OF CHRONIC RENAL FAILURE, STAGE 4 (SEVERE): ICD-10-CM

## 2018-02-07 DIAGNOSIS — N18.4 ANEMIA OF CHRONIC RENAL FAILURE, STAGE 4 (SEVERE): ICD-10-CM

## 2018-02-07 DIAGNOSIS — D56.3 ALPHA THALASSEMIA SILENT CARRIER: ICD-10-CM

## 2018-02-07 DIAGNOSIS — D57.3 SICKLE CELL TRAIT: ICD-10-CM

## 2018-02-07 DIAGNOSIS — N18.4 CKD (CHRONIC KIDNEY DISEASE) STAGE 4, GFR 15-29 ML/MIN: ICD-10-CM

## 2018-02-07 LAB
BASOPHILS # BLD AUTO: 0.02 K/UL
BASOPHILS NFR BLD: 0.4 %
DIFFERENTIAL METHOD: ABNORMAL
EOSINOPHIL # BLD AUTO: 0.2 K/UL
EOSINOPHIL NFR BLD: 3.3 %
ERYTHROCYTE [DISTWIDTH] IN BLOOD BY AUTOMATED COUNT: 20 %
HCT VFR BLD AUTO: 31.6 %
HGB BLD-MCNC: 10.4 G/DL
LYMPHOCYTES # BLD AUTO: 1.9 K/UL
LYMPHOCYTES NFR BLD: 38.2 %
MCH RBC QN AUTO: 25.8 PG
MCHC RBC AUTO-ENTMCNC: 32.9 G/DL
MCV RBC AUTO: 78 FL
MONOCYTES # BLD AUTO: 0.6 K/UL
MONOCYTES NFR BLD: 12.6 %
NEUTROPHILS # BLD AUTO: 2.2 K/UL
NEUTROPHILS NFR BLD: 45.5 %
PLATELET # BLD AUTO: 202 K/UL
PMV BLD AUTO: 10 FL
RBC # BLD AUTO: 4.03 M/UL
WBC # BLD AUTO: 4.92 K/UL

## 2018-02-07 PROCEDURE — 3008F BODY MASS INDEX DOCD: CPT | Mod: S$GLB,,, | Performed by: NURSE PRACTITIONER

## 2018-02-07 PROCEDURE — 1159F MED LIST DOCD IN RCRD: CPT | Mod: S$GLB,,, | Performed by: NURSE PRACTITIONER

## 2018-02-07 PROCEDURE — 36415 COLL VENOUS BLD VENIPUNCTURE: CPT | Mod: PO

## 2018-02-07 PROCEDURE — 99999 PR PBB SHADOW E&M-EST. PATIENT-LVL IV: CPT | Mod: PBBFAC,,, | Performed by: NURSE PRACTITIONER

## 2018-02-07 PROCEDURE — 85025 COMPLETE CBC W/AUTO DIFF WBC: CPT | Mod: PO

## 2018-02-07 PROCEDURE — 99214 OFFICE O/P EST MOD 30 MIN: CPT | Mod: S$GLB,,, | Performed by: NURSE PRACTITIONER

## 2018-02-07 PROCEDURE — 1126F AMNT PAIN NOTED NONE PRSNT: CPT | Mod: S$GLB,,, | Performed by: NURSE PRACTITIONER

## 2018-02-07 RX ORDER — AMOXICILLIN 250 MG
CAPSULE ORAL
COMMUNITY
Start: 2017-11-27 | End: 2018-11-27

## 2018-02-19 ENCOUNTER — TELEPHONE (OUTPATIENT)
Dept: GASTROENTEROLOGY | Facility: CLINIC | Age: 83
End: 2018-02-19

## 2018-02-19 ENCOUNTER — HOSPITAL ENCOUNTER (EMERGENCY)
Facility: HOSPITAL | Age: 83
Discharge: HOME | End: 2018-02-19
Attending: EMERGENCY MEDICINE
Payer: COMMERCIAL

## 2018-02-19 VITALS
HEART RATE: 76 BPM | DIASTOLIC BLOOD PRESSURE: 79 MMHG | HEIGHT: 62 IN | SYSTOLIC BLOOD PRESSURE: 146 MMHG | RESPIRATION RATE: 18 BRPM | OXYGEN SATURATION: 98 % | TEMPERATURE: 98 F

## 2018-02-19 DIAGNOSIS — K64.4 SKIN TAG OF ANUS: ICD-10-CM

## 2018-02-19 DIAGNOSIS — K62.5 BRIGHT RED RECTAL BLEEDING: Primary | ICD-10-CM

## 2018-02-19 LAB
ALBUMIN SERPL BCP-MCNC: 3.1 G/DL
ALP SERPL-CCNC: 69 U/L
ALT SERPL W/O P-5'-P-CCNC: 14 U/L
ANION GAP SERPL CALC-SCNC: 12 MMOL/L
ANISOCYTOSIS BLD QL SMEAR: SLIGHT
APTT BLDCRRT: 24.4 SEC
AST SERPL-CCNC: 28 U/L
BASOPHILS # BLD AUTO: 0.02 K/UL
BASOPHILS NFR BLD: 0.4 %
BILIRUB SERPL-MCNC: 0.3 MG/DL
BUN SERPL-MCNC: 84 MG/DL
CALCIUM SERPL-MCNC: 9.6 MG/DL
CHLORIDE SERPL-SCNC: 110 MMOL/L
CO2 SERPL-SCNC: 18 MMOL/L
CREAT SERPL-MCNC: 5.2 MG/DL
DACRYOCYTES BLD QL SMEAR: ABNORMAL
DIFFERENTIAL METHOD: ABNORMAL
EOSINOPHIL # BLD AUTO: 0.1 K/UL
EOSINOPHIL NFR BLD: 2.3 %
ERYTHROCYTE [DISTWIDTH] IN BLOOD BY AUTOMATED COUNT: 18.9 %
EST. GFR  (AFRICAN AMERICAN): 8 ML/MIN/1.73 M^2
EST. GFR  (NON AFRICAN AMERICAN): 7 ML/MIN/1.73 M^2
GLUCOSE SERPL-MCNC: 86 MG/DL
HCT VFR BLD AUTO: 29.9 %
HGB BLD-MCNC: 9.8 G/DL
HYPOCHROMIA BLD QL SMEAR: ABNORMAL
INR PPP: 1
LYMPHOCYTES # BLD AUTO: 1.5 K/UL
LYMPHOCYTES NFR BLD: 30.6 %
MCH RBC QN AUTO: 25.5 PG
MCHC RBC AUTO-ENTMCNC: 32.8 G/DL
MCV RBC AUTO: 78 FL
MONOCYTES # BLD AUTO: 0.7 K/UL
MONOCYTES NFR BLD: 14.8 %
NEUTROPHILS # BLD AUTO: 2.5 K/UL
NEUTROPHILS NFR BLD: 52.1 %
OVALOCYTES BLD QL SMEAR: ABNORMAL
PLATELET # BLD AUTO: 202 K/UL
PMV BLD AUTO: 9.6 FL
POIKILOCYTOSIS BLD QL SMEAR: SLIGHT
POTASSIUM SERPL-SCNC: 5.3 MMOL/L
PROT SERPL-MCNC: 8.4 G/DL
PROTHROMBIN TIME: 10.2 SEC
RBC # BLD AUTO: 3.85 M/UL
SODIUM SERPL-SCNC: 140 MMOL/L
TARGETS BLD QL SMEAR: ABNORMAL
WBC # BLD AUTO: 4.74 K/UL

## 2018-02-19 PROCEDURE — 99284 EMERGENCY DEPT VISIT MOD MDM: CPT

## 2018-02-19 PROCEDURE — 85027 COMPLETE CBC AUTOMATED: CPT

## 2018-02-19 PROCEDURE — 80053 COMPREHEN METABOLIC PANEL: CPT

## 2018-02-19 PROCEDURE — 85610 PROTHROMBIN TIME: CPT

## 2018-02-19 PROCEDURE — 85007 BL SMEAR W/DIFF WBC COUNT: CPT

## 2018-02-19 PROCEDURE — 85730 THROMBOPLASTIN TIME PARTIAL: CPT

## 2018-02-19 RX ORDER — DOCUSATE SODIUM 100 MG/1
100 CAPSULE, LIQUID FILLED ORAL 2 TIMES DAILY PRN
Qty: 30 CAPSULE | Refills: 0 | Status: SHIPPED | OUTPATIENT
Start: 2018-02-19

## 2018-02-19 NOTE — TELEPHONE ENCOUNTER
Called patient and patient stated she was having lots of rectal bleeding. Patient was offered a later appt today and patient declined. Patient was advised if she is having lots of bleeding and blood clots to go to the hospital for eval. Patient verbalized understanding.

## 2018-02-19 NOTE — TELEPHONE ENCOUNTER
----- Message from Kristopher Morfin sent at 2/19/2018  7:24 AM CST -----  Contact: Pt  Pt request a call from the nurse because she is bleeding from the rectum, please contact the pt at [phone]

## 2018-02-19 NOTE — ED PROVIDER NOTES
SCRIBE #1 NOTE: I, Obinna Al, am scribing for, and in the presence of, Luis Felipe Mills Jr., MD. I have scribed the entire note.      History      Chief Complaint   Patient presents with    Rectal Bleeding     c/o rectal bleeding that started this morning       Review of patient's allergies indicates:   Allergen Reactions    Allopurinol analogues Rash and Other (See Comments)     Sores in mouth  Sores in mouth  Sores in mouth    Diflucan [fluconazole] Swelling    Amlodipine Edema and Swelling    Ace inhibitors Nausea And Vomiting    Metronidazole Nausea And Vomiting    Phenytoin sodium extended Nausea And Vomiting        HPI   HPI    2/19/2018, 12:52 PM   History obtained from the patient      History of Present Illness: Gloria Sanz is a 86 y.o. female patient who presents to the Emergency Department for an evaluation of rectal bleeding which onset gradually today. Pt reports she woke up this morning with rectal bleeding which prompted her to come in for an evaluation. Symptoms are intermittent and moderate in severity. Exacerbated by nothing and relieved by nothing. Patient denies any fever, chills, blood in stool, N/V, hematuria, hematemesis, and all other sxs at this time. No further complaints or concerns at this time.         Arrival mode: Personal vehicle    PCP: Derrick Desouza MD       Past Medical History:  Past Medical History:   Diagnosis Date    Anemia     sickle cell trait, alpha thalessemia    Anemia of chronic renal failure 5/12/2014    Asthma, chronic     Cataract     GERD (gastroesophageal reflux disease)     Gout, arthritis     Helicobacter pylori gastritis     Hypertension     Osteoarthritis     Pulmonary HTN     Renal insufficiency        Past Surgical History:  Past Surgical History:   Procedure Laterality Date    CHOLECYSTECTOMY      endometrial polyp      FETAL BLOOD TRANSFUSION  12/11/2015    2 pints    THYROID SURGERY           Family History:  Family History    Problem Relation Age of Onset    Liver cancer Mother     Cancer Mother     Liver cancer Sister     Breast cancer Sister     Cancer Sister     Glaucoma Sister     Thyroid disease Sister     Melanoma Neg Hx     Eczema Neg Hx     Lupus Neg Hx     Psoriasis Neg Hx        Social History:  Social History     Social History Main Topics    Smoking status: Never Smoker    Smokeless tobacco: Never Used    Alcohol use No    Drug use: No    Sexual activity: Unknown       ROS   Review of Systems   Constitutional: Negative for chills and fever.   HENT: Negative for congestion and sore throat.    Respiratory: Negative for cough and shortness of breath.    Cardiovascular: Negative for chest pain.   Gastrointestinal: Positive for anal bleeding (Bright red). Negative for abdominal pain, blood in stool, constipation, diarrhea, nausea and vomiting.        (-) Hematemesis   Genitourinary: Negative for dysuria, frequency, hematuria and urgency.   Musculoskeletal: Negative for back pain.   Skin: Negative for rash.   Neurological: Negative for weakness and headaches.   Hematological: Does not bruise/bleed easily.     Physical Exam      Initial Vitals [02/19/18 1246]   BP Pulse Resp Temp SpO2   (!) 151/83 70 18 98.2 °F (36.8 °C) 98 %      MAP       105.67          Physical Exam  Nursing Notes and Vital Signs Reviewed.  Constitutional: Patient is in no acute distress. Well-developed and well-nourished.  Head: Atraumatic. Normocephalic.  Eyes: PERRL. EOM intact. Conjunctivae are not pale. No scleral icterus.  ENT: Mucous membranes are moist. Oropharynx is clear and symmetric.    Neck: Supple. Full ROM. No lymphadenopathy.  Cardiovascular: Regular rate. Regular rhythm. No murmurs, rubs, or gallops. Distal pulses are 2+ and symmetric.  Pulmonary/Chest: No respiratory distress. Clear to auscultation bilaterally. No wheezing or rales.  Abdominal: Soft and non-distended.  There is no tenderness.    Rectal:  No tenderness.  "Stenosis noted around rectum. Small skin tag with no active bleeding noted to external rectum. Non pulsatile 1x2 cm mass attached to perirectal area.   Musculoskeletal: Moves all extremities. No obvious deformities.   Skin: Warm and dry.  Neurological:  Alert, awake, and appropriate.  Normal speech.  No acute focal neurological deficits are appreciated.  Psychiatric: Normal affect. Good eye contact. Appropriate in content.    ED Course    Procedures  ED Vital Signs:  Vitals:    02/19/18 1246 02/19/18 1400 02/19/18 1402 02/19/18 1404   BP: (!) 151/83 (!) 153/81 (!) 143/87 (!) 151/85   Pulse: 70 66 72 81   Resp: 18      Temp: 98.2 °F (36.8 °C)      SpO2: 98%      Height: 5' 2" (1.575 m)          Abnormal Lab Results:  Labs Reviewed   CBC W/ AUTO DIFFERENTIAL - Abnormal; Notable for the following:        Result Value    RBC 3.85 (*)     Hemoglobin 9.8 (*)     Hematocrit 29.9 (*)     MCV 78 (*)     MCH 25.5 (*)     RDW 18.9 (*)     All other components within normal limits   COMPREHENSIVE METABOLIC PANEL - Abnormal; Notable for the following:     Potassium 5.3 (*)     CO2 18 (*)     BUN, Bld 84 (*)     Creatinine 5.2 (*)     Albumin 3.1 (*)     eGFR if  8 (*)     eGFR if non  7 (*)     All other components within normal limits   PROTIME-INR   APTT        All Lab Results:  Results for orders placed or performed during the hospital encounter of 02/19/18   CBC auto differential   Result Value Ref Range    WBC 4.74 3.90 - 12.70 K/uL    RBC 3.85 (L) 4.00 - 5.40 M/uL    Hemoglobin 9.8 (L) 12.0 - 16.0 g/dL    Hematocrit 29.9 (L) 37.0 - 48.5 %    MCV 78 (L) 82 - 98 fL    MCH 25.5 (L) 27.0 - 31.0 pg    MCHC 32.8 32.0 - 36.0 g/dL    RDW 18.9 (H) 11.5 - 14.5 %    Platelets 202 150 - 350 K/uL    MPV 9.6 9.2 - 12.9 fL    Gran # (ANC) 2.5 1.8 - 7.7 K/uL    Lymph # 1.5 1.0 - 4.8 K/uL    Mono # 0.7 0.3 - 1.0 K/uL    Eos # 0.1 0.0 - 0.5 K/uL    Baso # 0.02 0.00 - 0.20 K/uL    Gran% 52.1 38.0 - 73.0 %    " Lymph% 30.6 18.0 - 48.0 %    Mono% 14.8 4.0 - 15.0 %    Eosinophil% 2.3 0.0 - 8.0 %    Basophil% 0.4 0.0 - 1.9 %    Aniso Slight     Poik Slight     Hypo Occasional     Ovalocytes Occasional     Target Cells Occasional     Tear Drop Cells Occasional     Differential Method Automated    Comprehensive metabolic panel   Result Value Ref Range    Sodium 140 136 - 145 mmol/L    Potassium 5.3 (H) 3.5 - 5.1 mmol/L    Chloride 110 95 - 110 mmol/L    CO2 18 (L) 23 - 29 mmol/L    Glucose 86 70 - 110 mg/dL    BUN, Bld 84 (H) 8 - 23 mg/dL    Creatinine 5.2 (H) 0.5 - 1.4 mg/dL    Calcium 9.6 8.7 - 10.5 mg/dL    Total Protein 8.4 6.0 - 8.4 g/dL    Albumin 3.1 (L) 3.5 - 5.2 g/dL    Total Bilirubin 0.3 0.1 - 1.0 mg/dL    Alkaline Phosphatase 69 55 - 135 U/L    AST 28 10 - 40 U/L    ALT 14 10 - 44 U/L    Anion Gap 12 8 - 16 mmol/L    eGFR if African American 8 (A) >60 mL/min/1.73 m^2    eGFR if non African American 7 (A) >60 mL/min/1.73 m^2   Protime-INR   Result Value Ref Range    Prothrombin Time 10.2 9.0 - 12.5 sec    INR 1.0 0.8 - 1.2   APTT   Result Value Ref Range    aPTT 24.4 21.0 - 32.0 sec            The Emergency Provider reviewed the vital signs and test results, which are outlined above.    ED Discussion     2:24 PM: Dr. Mills discussed the pt's case with Dr. Antoine (GI) who states pt is table to d/c.    2:42 PM: Reassessed pt at this time. Pt is awake, alert, and in NAD. Pt states her condition has improved at this time. Discussed with pt all pertinent ED information and results. Discussed pt dx and plan of tx. Gave pt all f/u and return to the ED instructions. All questions and concerns were addressed at this time. Pt expresses understanding of information and instructions, and is comfortable with plan to discharge. Pt is stable for discharge.    I discussed with patient and/or family/caretaker that evaluation in the ED does not suggest any emergent or life threatening medical conditions requiring immediate  intervention beyond what was provided in the ED, and I believe patient is safe for discharge.  Regardless, an unremarkable evaluation in the ED does not preclude the development or presence of a serious of life threatening condition. As such, patient was instructed to return immediately for any worsening or change in current symptoms.      ED Medication(s):  Medications - No data to display    New Prescriptions    DOCUSATE SODIUM (COLACE) 100 MG CAPSULE    Take 1 capsule (100 mg total) by mouth 2 (two) times daily as needed for Constipation.       Follow-up Information     Derrick Desouza MD. Call today.    Specialty:  Family Medicine  Why:  to schedule appt for rechec  Contact information:  [address] [address]  [phone]             Call  Vladimir Antoine Iii, MD.    Specialty:  Gastroenterology  Why:  to schedule appt if rectal bleeding retuns/ resumes  Contact information:  [address]  [place] [id number] [phone]                     Medical Decision Making    Medical Decision Making:   Clinical Tests:   Lab Tests: Ordered and Reviewed           Scribe Attestation:   Scribe #1: I performed the above scribed service and the documentation accurately describes the services I performed. I attest to the accuracy of the note.    Attending:   Physician Attestation Statement for Scribe #1: I, Luis Felipe Mills Jr., MD, personally performed the services described in this documentation, as scribed by Obinna Al, in my presence, and it is both accurate and complete.          Clinical Impression       ICD-10-CM ICD-9-CM   1. Bright red rectal bleeding K62.5 569.3   2. Skin tag of anus K64.4 455.9       Disposition:   Disposition: Discharged  Condition: Stable         Luis Felipe Mills Jr., MD  02/19/18 1914

## 2018-02-26 RX ORDER — POLYETHYLENE GLYCOL 3350 17 G/17G
POWDER, FOR SOLUTION ORAL
Qty: 28 PACKET | Refills: 0 | Status: SHIPPED | OUTPATIENT
Start: 2018-02-26

## 2018-03-09 DIAGNOSIS — E87.20 ACIDOSIS, METABOLIC: ICD-10-CM

## 2018-03-09 RX ORDER — SODIUM BICARBONATE 650 MG/1
TABLET ORAL
Qty: 90 TABLET | Refills: 2 | Status: SHIPPED | OUTPATIENT
Start: 2018-03-09 | End: 2018-05-11 | Stop reason: CLARIF

## 2018-03-13 ENCOUNTER — OFFICE VISIT (OUTPATIENT)
Dept: OPHTHALMOLOGY | Facility: CLINIC | Age: 83
End: 2018-03-13
Payer: COMMERCIAL

## 2018-03-13 DIAGNOSIS — H01.02B SQUAMOUS BLEPHARITIS OF UPPER AND LOWER EYELIDS OF BOTH EYES: ICD-10-CM

## 2018-03-13 DIAGNOSIS — H16.203 KERATOCONJUNCTIVITIS OF BOTH EYES: Primary | ICD-10-CM

## 2018-03-13 DIAGNOSIS — H01.02A SQUAMOUS BLEPHARITIS OF UPPER AND LOWER EYELIDS OF BOTH EYES: ICD-10-CM

## 2018-03-13 PROCEDURE — 99999 PR PBB SHADOW E&M-EST. PATIENT-LVL I: CPT | Mod: PBBFAC,,, | Performed by: OPTOMETRIST

## 2018-03-13 PROCEDURE — 92002 INTRM OPH EXAM NEW PATIENT: CPT | Mod: S$GLB,,, | Performed by: OPTOMETRIST

## 2018-03-13 RX ORDER — TOBRAMYCIN AND DEXAMETHASONE 3; 1 MG/ML; MG/ML
1 SUSPENSION/ DROPS OPHTHALMIC 4 TIMES DAILY
Qty: 1 BOTTLE | Refills: 0 | Status: SHIPPED | OUTPATIENT
Start: 2018-03-13 | End: 2018-03-20

## 2018-03-13 NOTE — PROGRESS NOTES
HPI     Eye Problem    Additional comments: redness, pain x 2 days OD           Comments   PT c/o redness, light sensitivity, discharge and pain in her right eye for   about 2 days. PT states her left eye is also bothering her but her right   eye is hurting.   Pain Scale:  8  Onset:   2 days  OD, OS, OU:   OD>OS*  Discharge:   yes  A.M. Matting:  yes  Itch:   no  Redness:   yes  Photophobia:   yes  Foreign body sensation:   At times  Deep pain:   yes  Previous occurrence:   no  Drops:   no         Last edited by Shilpa Boston MA on 3/13/2018  1:19 PM. (History)            Assessment /Plan     For exam results, see Encounter Report.    Keratoconjunctivitis of both eyes    Squamous blepharitis of upper and lower eyelids of both eyes    Other orders  -     tobramycin-dexamethasone 0.3-0.1% (TOBRADEX) 0.3-0.1 % DrpS; Place 1 drop into both eyes 4 (four) times daily.  Dispense: 1 Bottle; Refill: 0      Start tobradex qid OU x 1 week, then d/c  RTC 1 week for f/u, PRN sooner if symptoms do not improve x 2 days   Discussed above and all questions were answered.     Full exam at f/u, do NOT dilate, need to recheck angles

## 2018-03-15 ENCOUNTER — INFUSION (OUTPATIENT)
Dept: INFUSION THERAPY | Facility: HOSPITAL | Age: 83
End: 2018-03-15
Attending: INTERNAL MEDICINE
Payer: COMMERCIAL

## 2018-03-15 ENCOUNTER — LAB VISIT (OUTPATIENT)
Dept: LAB | Facility: HOSPITAL | Age: 83
End: 2018-03-15
Attending: NURSE PRACTITIONER
Payer: COMMERCIAL

## 2018-03-15 ENCOUNTER — OFFICE VISIT (OUTPATIENT)
Dept: HEMATOLOGY/ONCOLOGY | Facility: CLINIC | Age: 83
End: 2018-03-15
Payer: COMMERCIAL

## 2018-03-15 VITALS
OXYGEN SATURATION: 100 % | HEART RATE: 126 BPM | RESPIRATION RATE: 18 BRPM | SYSTOLIC BLOOD PRESSURE: 148 MMHG | DIASTOLIC BLOOD PRESSURE: 72 MMHG | TEMPERATURE: 97 F | WEIGHT: 142.19 LBS | BODY MASS INDEX: 26.17 KG/M2 | HEIGHT: 62 IN

## 2018-03-15 DIAGNOSIS — D56.3 ALPHA THALASSEMIA SILENT CARRIER: ICD-10-CM

## 2018-03-15 DIAGNOSIS — D57.3 SICKLE CELL TRAIT: ICD-10-CM

## 2018-03-15 DIAGNOSIS — D63.1 ANEMIA OF CHRONIC RENAL FAILURE, STAGE 4 (SEVERE): ICD-10-CM

## 2018-03-15 DIAGNOSIS — N18.4 CKD (CHRONIC KIDNEY DISEASE) STAGE 4, GFR 15-29 ML/MIN: ICD-10-CM

## 2018-03-15 DIAGNOSIS — N18.4 ANEMIA OF CHRONIC RENAL FAILURE, STAGE 4 (SEVERE): Primary | ICD-10-CM

## 2018-03-15 DIAGNOSIS — D63.1 ANEMIA OF CHRONIC RENAL FAILURE, STAGE 3 (MODERATE): Primary | ICD-10-CM

## 2018-03-15 DIAGNOSIS — D63.1 ANEMIA OF CHRONIC RENAL FAILURE, STAGE 4 (SEVERE): Primary | ICD-10-CM

## 2018-03-15 DIAGNOSIS — N18.4 ANEMIA OF CHRONIC RENAL FAILURE, STAGE 4 (SEVERE): ICD-10-CM

## 2018-03-15 DIAGNOSIS — N18.30 ANEMIA OF CHRONIC RENAL FAILURE, STAGE 3 (MODERATE): Primary | ICD-10-CM

## 2018-03-15 LAB
BASOPHILS # BLD AUTO: 0.03 K/UL
BASOPHILS NFR BLD: 0.4 %
DIFFERENTIAL METHOD: ABNORMAL
EOSINOPHIL # BLD AUTO: 0.2 K/UL
EOSINOPHIL NFR BLD: 2.8 %
ERYTHROCYTE [DISTWIDTH] IN BLOOD BY AUTOMATED COUNT: 17.8 %
FERRITIN SERPL-MCNC: 175 NG/ML
HCT VFR BLD AUTO: 23.5 %
HGB BLD-MCNC: 7.8 G/DL
IRON SERPL-MCNC: 77 UG/DL
LYMPHOCYTES # BLD AUTO: 1.9 K/UL
LYMPHOCYTES NFR BLD: 25.4 %
MCH RBC QN AUTO: 25.5 PG
MCHC RBC AUTO-ENTMCNC: 33.2 G/DL
MCV RBC AUTO: 77 FL
MONOCYTES # BLD AUTO: 1.1 K/UL
MONOCYTES NFR BLD: 15.2 %
NEUTROPHILS # BLD AUTO: 4.2 K/UL
NEUTROPHILS NFR BLD: 56.2 %
PLATELET # BLD AUTO: 211 K/UL
PMV BLD AUTO: 9.5 FL
RBC # BLD AUTO: 3.06 M/UL
SATURATED IRON: 36 %
TOTAL IRON BINDING CAPACITY: 216 UG/DL
TRANSFERRIN SERPL-MCNC: 146 MG/DL
WBC # BLD AUTO: 7.45 K/UL

## 2018-03-15 PROCEDURE — 36415 COLL VENOUS BLD VENIPUNCTURE: CPT | Mod: PO

## 2018-03-15 PROCEDURE — 83540 ASSAY OF IRON: CPT

## 2018-03-15 PROCEDURE — 99999 PR PBB SHADOW E&M-EST. PATIENT-LVL IV: CPT | Mod: PBBFAC,,, | Performed by: INTERNAL MEDICINE

## 2018-03-15 PROCEDURE — 63600175 PHARM REV CODE 636 W HCPCS: Mod: JG,PO | Performed by: INTERNAL MEDICINE

## 2018-03-15 PROCEDURE — 99214 OFFICE O/P EST MOD 30 MIN: CPT | Mod: S$GLB,,, | Performed by: INTERNAL MEDICINE

## 2018-03-15 PROCEDURE — 96372 THER/PROPH/DIAG INJ SC/IM: CPT | Mod: PO

## 2018-03-15 PROCEDURE — 85025 COMPLETE CBC W/AUTO DIFF WBC: CPT | Mod: PO

## 2018-03-15 PROCEDURE — 82728 ASSAY OF FERRITIN: CPT

## 2018-03-15 RX ADMIN — ERYTHROPOIETIN 40000 UNITS: 40000 INJECTION, SOLUTION INTRAVENOUS; SUBCUTANEOUS at 12:03

## 2018-03-15 NOTE — PROGRESS NOTES
Subjective:       Patient ID: Gloria Sanz is a 86 y.o. female.    Chief Complaint: No chief complaint on file.    HPI  Review of Systems    Objective:      Physical Exam    Assessment:This 84 year old AA lady comes for follow uo her anemia associated to chronic renal failure.  She is known to us because in MAy 2010 had a cbc that showed a hemoglobin of 10.6 with an MCV of 79.4. White cell count was 4,380 (differential included 17% monocytes) and a platelet count of 217,000 Review of information in the computer indicated that the patient has had mild anemia dating back a number of years\.    In 1991, her hemoglobin was 11.7 with an MCV of 84. Her MCV through the years has been anywhere from 79-84. The patient has had several iron studies that showed an elevated ferritin, a creatinine of 1.7 as well as serum protein electrophoresis that has failed to show a monoclonal spike.    . B12 levels have been normal.    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    The patient comes today for routine follow up.    At the end dec 2011 she had a severe epistaxis requiring a blood transfusion. She ended up having surgery for nasal polyps In May 2012 she ha had a drop of hr HGB to 8.8 and her creatinine was 1.8 She slowly improved ion her own. SPEp showed a polyclonal gammopathy and her free light chains showed elevations of both the kappa and lambda light chains which goes against clonality.       She had the zoster vaccine in 2008    She is on procrit every 2 weeks when needed    She is know to have a sub-centimeter pulmonary nodule in the right middle lobe, Ct scan in December 2015 showed stability.A CT scan done 3/1027 showed a new nodule, measuring 4.6 mm in the right lung.  She is scheduled to have it repeated in a year ng A multinodular thyroid was found and an US was  suggested.The [facility] showed the same.       She has recurrent epistaxis which is felt to be due to HBP     She comes for follow up to see if she will need procrit.     ALLERGIES: See med card  MEDICATIONS: See MedCard.  PREVIOUS SURGERIES: Thyroid nodule removed many years ago and endometrial  polyp removed in 2009.  Nasal polyps surgery around april 2012  SOCIAL HISTORY: She is  with three children. She lives in [place].  She does not smoke or drink. 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  FAMILY HISTORY: Two sisters have liver cancer, and another one has breast  cancer. Mother had cancer of the gallbladder. Father had a heart attack.  No diabetes in the family.  PAST MEDICAL HISTORY:  1. 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  2. Alpha globin gene rearrangement test showed that she is an alpha  thalassemia carrier.  3. Chronic anemia.  4-elevated creatinine  5. Hypertension.  6. Status post removal of endometrial polyp.  7. Epistaxis-recurrent felt to be due to HBP.  8. hx of epistaxis Dec 2011 ( nasal polyps       1. Anemia of chronic renal failure, stage 4 (severe)        Wt Readings from Last 3 Encounters:   03/15/18 64.5 kg (142 lb 3.2 oz)   02/07/18 61.6 kg (135 lb 12.9 oz)   12/27/17 62.6 kg (138 lb 0.1 oz)     Temp Readings from Last 3 Encounters:   03/15/18 97.4 °F (36.3 °C) (Oral)   02/19/18 98 °F (36.7 °C)   01/23/18 98 °F (36.7 °C)     BP Readings from Last 3 Encounters:   03/15/18 (!) 148/72   02/19/18 (!) 146/79   02/07/18 132/62     Pulse Readings from Last 3 Encounters:   03/15/18 (!) 126   02/19/18 76   02/07/18 85       Plan:       Lab Results   Component Value Date    WBC 7.45 03/15/2018    HGB 7.8 (L) 03/15/2018    HCT 23.5 (L) 03/15/2018    MCV 77 (L) 03/15/2018     03/15/2018     Lab Results   Component Value Date    CREATININE 5.2 (H) 02/19/2018     She will restart procrit 40,000 units every 2 weeks. See me and nurses  in 4 weeks

## 2018-03-16 ENCOUNTER — TELEPHONE (OUTPATIENT)
Dept: RADIOLOGY | Facility: HOSPITAL | Age: 83
End: 2018-03-16

## 2018-03-16 DIAGNOSIS — N18.4 ANEMIA OF CHRONIC RENAL FAILURE, STAGE 4 (SEVERE): Primary | ICD-10-CM

## 2018-03-16 DIAGNOSIS — D63.1 ANEMIA OF CHRONIC RENAL FAILURE, STAGE 4 (SEVERE): Primary | ICD-10-CM

## 2018-03-19 ENCOUNTER — TELEPHONE (OUTPATIENT)
Dept: PULMONOLOGY | Facility: CLINIC | Age: 83
End: 2018-03-19

## 2018-03-19 NOTE — TELEPHONE ENCOUNTER
----- Message from Isauro Bowen sent at 3/19/2018  2:27 PM CDT -----  Contact: Pt  Please give pt a call at ..[phone] (home) regarding appt she missed

## 2018-03-28 ENCOUNTER — OFFICE VISIT (OUTPATIENT)
Dept: PODIATRY | Facility: CLINIC | Age: 83
End: 2018-03-28
Payer: COMMERCIAL

## 2018-03-28 VITALS — HEIGHT: 62 IN | WEIGHT: 142.19 LBS | BODY MASS INDEX: 26.17 KG/M2

## 2018-03-28 DIAGNOSIS — B35.1 DERMATOPHYTOSIS OF NAIL: Primary | ICD-10-CM

## 2018-03-28 DIAGNOSIS — G60.9 IDIOPATHIC PERIPHERAL NEUROPATHY: ICD-10-CM

## 2018-03-28 DIAGNOSIS — N18.4 CKD (CHRONIC KIDNEY DISEASE) STAGE 4, GFR 15-29 ML/MIN: ICD-10-CM

## 2018-03-28 PROCEDURE — 99999 PR PBB SHADOW E&M-EST. PATIENT-LVL III: CPT | Mod: PBBFAC,,, | Performed by: PODIATRIST

## 2018-03-28 PROCEDURE — 99213 OFFICE O/P EST LOW 20 MIN: CPT | Mod: 25,S$GLB,, | Performed by: PODIATRIST

## 2018-03-28 PROCEDURE — 11721 DEBRIDE NAIL 6 OR MORE: CPT | Mod: Q9,S$GLB,, | Performed by: PODIATRIST

## 2018-03-28 NOTE — PROGRESS NOTES
"[facility] -   PODIATRIC MEDICINE AND SURGERY  PROGRESS NOTE     CHIEF COMPLAINT  Chief Complaint   Patient presents with    Routine Foot Care     at risk foot/nail care PCP Dr. Desouza 11/30/17         HPI    SUBJECTIVE: Gloria Sanz is a 86 y.o. female who  has a past medical history of Anemia; Anemia of chronic renal failure (5/12/2014); Asthma, chronic; Cataract; GERD (gastroesophageal reflux disease); Gout, arthritis; Helicobacter pylori gastritis; Hypertension; Osteoarthritis; Pulmonary HTN; and Renal insufficiency. Gloriapresents to clinic for high risk  foot exam and care.  Gloria admits numbness, burning, and/or tingling sensations in their feet. Patient admits to painful toenails aggravated by increased weight bearing, shoe gear, and pressure. States pain is relieved with routine debridements.    Patient has no other pedal complaints at this time.    HgA1c:   Hemoglobin A1C   Date Value Ref Range Status   02/10/2011 5.4 4.0 - 6.2 % Final         PMH  Past Medical History:   Diagnosis Date    Anemia     sickle cell trait, alpha thalessemia    Anemia of chronic renal failure 5/12/2014    Asthma, chronic     Cataract     GERD (gastroesophageal reflux disease)     Gout, arthritis     Helicobacter pylori gastritis     Hypertension     Osteoarthritis     Pulmonary HTN     Renal insufficiency      PHYSICAL EXAM  Vitals:    03/28/18 1100   Weight: 64.5 kg (142 lb 3.2 oz)   Height: 5' 2" (1.575 m)      REVIEW OF SYSTEMS  Review of Systems   Constitutional: Negative for chills and fever.   Musculoskeletal: Negative for joint pain.   Skin: Negative for itching.   Neurological: Negative for dizziness and tingling.       GEN:  This patient is well-developed, well-nourished and appears stated age, well-oriented to person, place and time, and cooperative and pleasant on today's visit.      LOWER EXTREMITY  Vascular:   · DP pedal pulse 2/4 b/l, PT pedal pulse 2/4 b/l  · Skin temperature " warm to warm from prox to distally  · CFT <5 secs b/l  · There is no edema noted b/l.     Dermatologic:   · Thickened, dystrophic, elongated toenails with subungal debris 1-5 b/l.   · No open skin lesions noted  · No erythema or drainage noted b/l.  · Webspaces are C/D/I B/L.  · Skin texture and turgor   · There is no pedal hair growth noted    Neurologic:  · Protective sensation absent at 0/10 sites upon examination with [place] Weinsten 5.07 g monofilament.   · Propioception intact at 1st MTPJ b/l.   · Babinski reflex absent b/l. Light touch and sharp/dull sensation intact b/l.    Musculoskeletal/Orthopedic:  · No symptomatic structural abnormalities noted.   · Bilateral HAV deformity with prominent medial eminence 1st metatarsal RIGHT with pain upon palpation  · There are semi rigid hammertoe deformities 2-5 b/l  · Muscle strength is 5/5 for foot inverters, everters, plantarflexors, and dorsiflexors. Muscle tone is normal.  · Pain free range of motion in all four quadrants with stiffness and limitation b/l  · Foot type: pronated with decreased medial longituidinal arch         ASSESSMENT  Encounter Diagnoses   Name Primary?    Dermatophytosis of nail Yes    Idiopathic peripheral neuropathy     CKD (chronic kidney disease) stage 4, GFR 15-29 ml/min        PLAN  -patient was examined and evaluated    Dermatophytosis of nail    Idiopathic peripheral neuropathy    CKD (chronic kidney disease) stage 4, GFR 15-29 ml/min      -Discuss presenting problems, etiology, pathologic processes and management options with patient today.   -I counseled the patient on their conditions, their implications and medical management. An in depth discussion on diabetic management, risk prevention, amputation prevention verbally and provided educational literature in written format.  -With patient's permission, nails were aggressively reduced and debrided x 10 to their soft tissue attachment mechanically  removing all offending nail and  debris. Patient relates relief following the procedure. Patient will continue to monitor the areas daily, inspect feet, wear protective shoe gear when ambulatory, moisturizer to maintain skin integrity.      Future Appointments  Date Time Provider Department Center   3[facility]29[facility]2018 11:15 [facility] [place]   4/6/2018 10:30 AM [facility] CT1 LIMIT 500 LBS [facility] CT SCAN [place]   4/6/2018 10:50 AM PULMONARY LAB, [facility] PULMLAB [place]   4/6/2018 11:20 AM Parth Bennett MD [facility] PULMSVC [place]   4/12/2018 10:25 AM LABORATORY, [facility] LAB [place]   4/12/2018 10:40 AM Marcus Clark MD [facility] HEM ONC [place]   4/12/2018 11:00 AM CHAIR 01 [facility] CHEMO [place]   6/28/2018 11:00 AM Gi Cotto DPM [facility] POD [place]

## 2018-03-29 ENCOUNTER — INFUSION (OUTPATIENT)
Dept: INFUSION THERAPY | Facility: HOSPITAL | Age: 83
End: 2018-03-29
Attending: INTERNAL MEDICINE
Payer: COMMERCIAL

## 2018-03-29 VITALS
OXYGEN SATURATION: 100 % | SYSTOLIC BLOOD PRESSURE: 141 MMHG | TEMPERATURE: 98 F | HEART RATE: 89 BPM | RESPIRATION RATE: 18 BRPM | DIASTOLIC BLOOD PRESSURE: 76 MMHG

## 2018-03-29 DIAGNOSIS — D63.1 ANEMIA OF CHRONIC RENAL FAILURE, STAGE 3 (MODERATE): Primary | ICD-10-CM

## 2018-03-29 DIAGNOSIS — N18.30 ANEMIA OF CHRONIC RENAL FAILURE, STAGE 3 (MODERATE): Primary | ICD-10-CM

## 2018-03-29 PROCEDURE — 63600175 PHARM REV CODE 636 W HCPCS: Mod: JG,PO | Performed by: INTERNAL MEDICINE

## 2018-03-29 PROCEDURE — 96372 THER/PROPH/DIAG INJ SC/IM: CPT | Mod: PO

## 2018-03-29 RX ADMIN — ERYTHROPOIETIN 40000 UNITS: 40000 INJECTION, SOLUTION INTRAVENOUS; SUBCUTANEOUS at 01:03

## 2018-04-05 ENCOUNTER — TELEPHONE (OUTPATIENT)
Dept: RADIOLOGY | Facility: HOSPITAL | Age: 83
End: 2018-04-05

## 2018-04-06 ENCOUNTER — TELEPHONE (OUTPATIENT)
Dept: PULMONOLOGY | Facility: CLINIC | Age: 83
End: 2018-04-06

## 2018-04-06 ENCOUNTER — HOSPITAL ENCOUNTER (OUTPATIENT)
Dept: RADIOLOGY | Facility: HOSPITAL | Age: 83
Discharge: HOME | End: 2018-04-06
Attending: NURSE PRACTITIONER
Payer: COMMERCIAL

## 2018-04-06 ENCOUNTER — PROCEDURE VISIT (OUTPATIENT)
Dept: PULMONOLOGY | Facility: CLINIC | Age: 83
End: 2018-04-06
Payer: COMMERCIAL

## 2018-04-06 ENCOUNTER — OFFICE VISIT (OUTPATIENT)
Dept: PULMONOLOGY | Facility: CLINIC | Age: 83
End: 2018-04-06
Payer: COMMERCIAL

## 2018-04-06 VITALS
HEIGHT: 62 IN | WEIGHT: 142 LBS | HEART RATE: 128 BPM | BODY MASS INDEX: 26.13 KG/M2 | DIASTOLIC BLOOD PRESSURE: 80 MMHG | RESPIRATION RATE: 17 BRPM | OXYGEN SATURATION: 99 % | SYSTOLIC BLOOD PRESSURE: 140 MMHG

## 2018-04-06 DIAGNOSIS — J45.20 MILD INTERMITTENT ASTHMA WITHOUT COMPLICATION: Primary | ICD-10-CM

## 2018-04-06 DIAGNOSIS — R91.1 PULMONARY NODULE SEEN ON IMAGING STUDY: ICD-10-CM

## 2018-04-06 DIAGNOSIS — J45.40 MODERATE PERSISTENT ASTHMA WITHOUT COMPLICATION: ICD-10-CM

## 2018-04-06 DIAGNOSIS — N28.1 KIDNEY CYSTS: ICD-10-CM

## 2018-04-06 DIAGNOSIS — R91.8 GROUND GLASS OPACITY PRESENT ON IMAGING OF LUNG: ICD-10-CM

## 2018-04-06 DIAGNOSIS — R03.0 ELEVATED BLOOD PRESSURE READING: ICD-10-CM

## 2018-04-06 LAB
PRE FEF 25 75: 1.74 L/S (ref 0.41–1.79)
PRE FET 100: 10.86 S
PRE FEV1 FVC: 73 %
PRE FEV1: 2.04 L (ref 1.1–1.71)
PRE FIF 50: 1.68 L/S
PRE FVC: 2.79 L (ref 1.45–2.17)
PRE PEF: 4.28 L/S (ref 2.31–4.32)
PREDICTED FEV1 FVC: 72.53 % (ref 67.64–77.43)
PREDICTED FEV1: 1.4 L (ref 1.1–1.71)
PREDICTED FVC: 1.81 L (ref 1.45–2.17)
PROVOCATION PROTOCOL: ABNORMAL

## 2018-04-06 PROCEDURE — 99999 PR PBB SHADOW E&M-EST. PATIENT-LVL IV: CPT | Mod: PBBFAC,,, | Performed by: INTERNAL MEDICINE

## 2018-04-06 PROCEDURE — 99214 OFFICE O/P EST MOD 30 MIN: CPT | Mod: 25,S$GLB,, | Performed by: INTERNAL MEDICINE

## 2018-04-06 PROCEDURE — 94010 BREATHING CAPACITY TEST: CPT | Mod: S$GLB,,, | Performed by: INTERNAL MEDICINE

## 2018-04-06 PROCEDURE — 71250 CT THORAX DX C-: CPT | Mod: TC,PO

## 2018-04-06 PROCEDURE — 71250 CT THORAX DX C-: CPT | Mod: 26,,, | Performed by: RADIOLOGY

## 2018-04-06 RX ORDER — ALBUTEROL SULFATE 90 UG/1
2 AEROSOL, METERED RESPIRATORY (INHALATION) EVERY 6 HOURS PRN
Qty: 1 EACH | Refills: 11 | Status: SHIPPED | OUTPATIENT
Start: 2018-04-06

## 2018-04-06 NOTE — Clinical Note
Please inform final CT report asks for repeat CT 3 months and US cyst in left kidney, follow up 3 months review CT, get US next week

## 2018-04-06 NOTE — ASSESSMENT & PLAN NOTE
Asthma ROS:   She is taking medications regularly as instructed, no medication side effects noted, no significant ongoing wheezing or shortness of breath.   BREO ( NOT USED)   and rescue albuterol  ACT score 19  New concerns: None.      Exam: appears well, vitals normal, no respiratory distress, acyanotic, normal RR, chest clear, no wheezing, crepitations, rhonchi, normal symmetric air entry.      Assessment: Asthma well controlled.      Plan:  CONTINUE ALBUTEROL. Orders as documented in EMR.Re evaluate in 1 year

## 2018-04-06 NOTE — TELEPHONE ENCOUNTER
----- Message from Parth Bennett MD sent at 4/6/2018  3:06 PM CDT -----  Please inform final CT report asks for repeat CT 3 months and US cyst in left kidney, follow up 3 months review CT, get US next week

## 2018-04-06 NOTE — PROGRESS NOTES
"Subjective:       Patient ID: Gloria Sanz is a 86 y.o. female.    Chief Complaint: Asthma    Ms. Gloria Sanz is 86 years old  Follow-up appointment,  06/23/2017  Asthma score is 19  She is currently on Breo Ellipta ( not taking)  Only taking Albuterol HFA asrescue albuterol  No cough no wheezing no shortness of breath no sputum no interval exacerbations  Immunizations are up-to-date  Spirometry showed FEV1 improved by 31 % and FVC improved by 22 %  She has a pulmonary nodule that'll need follow-up in March 2018  Preliminary chest CT reviewed does not show any interval change in nodule size.  Reviewed all tests with her.  Her asthma is stable and doing well      Asthma   There is no cough, sputum production or wheezing. Her past medical history is significant for asthma.     Review of Systems   Constitutional: Negative.    HENT: Negative.    Eyes: Negative.    Respiratory: Negative.  Negative for cough, sputum production, wheezing, dyspnea on extertion and use of rescue inhaler.    Cardiovascular: Negative.    Genitourinary: Negative.    Endocrine: endocrine negative   Musculoskeletal: Negative.    Skin: Negative.    Gastrointestinal: Negative.    Neurological: Negative.    Psychiatric/Behavioral: Negative.        Objective:       Vitals:    04/06/18 1225 04/06/18 1243   BP: (!) 160/90 (!) 140/80   Pulse: (!) 128    Resp: 17    SpO2: 99%    Weight: 64.4 kg (142 lb)    Height: 5' 2" (1.575 m)      Physical Exam   Constitutional: She is oriented to person, place, and time. She appears well-developed and well-nourished.   HENT:   Head: Normocephalic.   Nose: Nose normal.   Neck: Normal range of motion. Neck supple. No JVD present.   Cardiovascular: Normal rate, regular rhythm and normal heart sounds.    Pulmonary/Chest: Normal expansion, symmetric chest wall expansion, effort normal and breath sounds normal.   Abdominal: Soft. Bowel sounds are normal.   Musculoskeletal: Normal range of motion. "   Lymphadenopathy:     She has no cervical adenopathy.   Neurological: She is alert and oriented to person, place, and time.   Skin: Skin is warm and dry.   Psychiatric: She has a normal mood and affect.   Nursing note and vitals reviewed.    Personal Diagnostic Review  Pulmonary function tests: FEV1: 2.06  (145 % predicted), FVC:  2.79 (154 % predicted), FEV1/FVC:  73    Overall FEV1 improved by 31%, FVC improved by 22%    Chest CT  Lungs: Previously described solid noncalcified pulmonary nodule in the right middle lobe is unchanged measuring an average of 6.3 mm as seen on axial image 135, series 3.  Multiple other smaller noncalcified bilateral pulmonary nodules are also unchanged.  Largest nodule on the left measures approximately 3 mm in the lower lobe as seen on axial image 140, series 3.  Benign-appearing intra fissural lymph node in the superior segment of the left lower lobe is unchanged.  Mild bilateral subpleural reticulation and micro nodularity again noted in the lung bases, also unchanged.  There is new ill-defined ground-glass opacity in the periphery of the right upper lobe suggestive of infectious/inflammatory process.  Previously described 4.6 mm right upper lobe pulmonary nodule in this region now appears to be of more ground-glass attenuation and closely associated with above described ground-glass density.  No definite new pulmonary nodules visualized.  No parenchymal consolidations or pleural effusions.    Upper Abdomen: Numerous splenic calcifications again noted suggesting prior granulomatous disease.  There are multiple bilateral renal cysts.  Suggestion of a possible solid lesion involving the upper pole of the left kidney measuring up to 3.1 cm.  Prior cholecystectomy noted.    Bones: No acute or suspicious osseous findings.    Miscellaneous: None   Impression       1. New ill-defined ground-glass opacity in the periphery of the right upper lobe which is suggestive of  infectious/inflammatory process.  Correlate clinically.  2. Other multiple previously described pulmonary nodules are unchanged with the largest measuring an average of 6.3 mm in the right middle lobe.  3. Suggestion of possible solid lesion measuring up to 3.1 cm at the upper pole of the left kidney.  Recommend correlation with CT with and without contrast.  Alternatively, renal ultrasound may be performed.       No flowsheet data found.      Assessment:       Problem List Items Addressed This Visit     Kidney cysts    Relevant Orders    US Kidney Only    Mild intermittent asthma without complication - Primary     Asthma ROS:   She is taking medications regularly as instructed, no medication side effects noted, no significant ongoing wheezing or shortness of breath.   BREO ( NOT USED)   and rescue albuterol  ACT score 19  New concerns: None.      Exam: appears well, vitals normal, no respiratory distress, acyanotic, normal RR, chest clear, no wheezing, crepitations, rhonchi, normal symmetric air entry.      Assessment: Asthma well controlled.      Plan:  CONTINUE ALBUTEROL. Orders as documented in EMR.Re evaluate in 1 year         Relevant Medications    albuterol 90 mcg/actuation inhaler    Other Relevant Orders    X-Ray Chest PA And Lateral    Spirometry with/without bronchodilator    Pulmonary nodule seen on imaging study    Relevant Orders    CT Chest Without Contrast      Other Visit Diagnoses     Elevated blood pressure reading        Ground glass opacity present on imaging of lung        Relevant Orders    CT Chest Without Contrast        Plan:       Asthma stable  Follow up nodule  Follow up GGO new RUL  US kidney next week      Follow-up for Spirometry and CXR next visit, follow up BP reading with PCP.    This note was prepared using voice recognition system and is likely to have sound alike errors that may have been overlooked even after proof reading.  Please call me with any questions    Discussed  diagnosis, its evaluation, treatment and usual course. All questions answered.    Thank you for the courtesy of participating in the care of this patient    Parth Bennett MD

## 2018-04-11 ENCOUNTER — TELEPHONE (OUTPATIENT)
Dept: RADIOLOGY | Facility: HOSPITAL | Age: 83
End: 2018-04-11

## 2018-04-12 ENCOUNTER — HOSPITAL ENCOUNTER (OUTPATIENT)
Dept: RADIOLOGY | Facility: HOSPITAL | Age: 83
Discharge: HOME | End: 2018-04-12
Attending: INTERNAL MEDICINE
Payer: COMMERCIAL

## 2018-04-12 ENCOUNTER — INFUSION (OUTPATIENT)
Dept: INFUSION THERAPY | Facility: HOSPITAL | Age: 83
End: 2018-04-12
Attending: INTERNAL MEDICINE
Payer: COMMERCIAL

## 2018-04-12 ENCOUNTER — OFFICE VISIT (OUTPATIENT)
Dept: HEMATOLOGY/ONCOLOGY | Facility: CLINIC | Age: 83
End: 2018-04-12
Payer: COMMERCIAL

## 2018-04-12 VITALS
OXYGEN SATURATION: 99 % | TEMPERATURE: 98 F | BODY MASS INDEX: 24.88 KG/M2 | RESPIRATION RATE: 18 BRPM | WEIGHT: 136 LBS | SYSTOLIC BLOOD PRESSURE: 170 MMHG | HEIGHT: 62 IN | BODY MASS INDEX: 25.03 KG/M2 | WEIGHT: 136 LBS | HEART RATE: 115 BPM | DIASTOLIC BLOOD PRESSURE: 80 MMHG

## 2018-04-12 DIAGNOSIS — D63.1 ANEMIA OF CHRONIC RENAL FAILURE, STAGE 4 (SEVERE): Primary | ICD-10-CM

## 2018-04-12 DIAGNOSIS — D56.3 ALPHA THALASSEMIA SILENT CARRIER: ICD-10-CM

## 2018-04-12 DIAGNOSIS — N28.1 KIDNEY CYSTS: ICD-10-CM

## 2018-04-12 DIAGNOSIS — N18.30 ANEMIA OF CHRONIC RENAL FAILURE, STAGE 3 (MODERATE): Primary | ICD-10-CM

## 2018-04-12 DIAGNOSIS — N18.4 CKD (CHRONIC KIDNEY DISEASE) STAGE 4, GFR 15-29 ML/MIN: ICD-10-CM

## 2018-04-12 DIAGNOSIS — N28.89 LEFT KIDNEY MASS: ICD-10-CM

## 2018-04-12 DIAGNOSIS — N18.4 ANEMIA OF CHRONIC RENAL FAILURE, STAGE 4 (SEVERE): Primary | ICD-10-CM

## 2018-04-12 DIAGNOSIS — D63.1 ANEMIA OF CHRONIC RENAL FAILURE, STAGE 3 (MODERATE): Primary | ICD-10-CM

## 2018-04-12 PROCEDURE — 76770 US EXAM ABDO BACK WALL COMP: CPT | Mod: 26,,, | Performed by: RADIOLOGY

## 2018-04-12 PROCEDURE — 76770 US EXAM ABDO BACK WALL COMP: CPT | Mod: TC,PO

## 2018-04-12 PROCEDURE — 96372 THER/PROPH/DIAG INJ SC/IM: CPT | Mod: PO

## 2018-04-12 PROCEDURE — 99999 PR PBB SHADOW E&M-EST. PATIENT-LVL IV: CPT | Mod: PBBFAC,,, | Performed by: INTERNAL MEDICINE

## 2018-04-12 PROCEDURE — 63600175 PHARM REV CODE 636 W HCPCS: Mod: JG,PO | Performed by: INTERNAL MEDICINE

## 2018-04-12 PROCEDURE — 99214 OFFICE O/P EST MOD 30 MIN: CPT | Mod: 25,S$GLB,, | Performed by: INTERNAL MEDICINE

## 2018-04-12 RX ADMIN — ERYTHROPOIETIN 40000 UNITS: 40000 INJECTION, SOLUTION INTRAVENOUS; SUBCUTANEOUS at 11:04

## 2018-04-12 NOTE — PROGRESS NOTES
Subjective:       Patient ID: Gloria Sanz is a 86 y.o. female.    Chief Complaint: Follow-up    HPI   Assessment:This 84 year old AA lady comes for follow uo her anemia associated to chronic renal failure.  She is known to us because in MAy 2010 had a cbc that showed a hemoglobin of 10.6 with an MCV of 79.4. White cell count was 4,380 (differential included 17% monocytes) and a platelet count of 217,000 Review of information in the computer indicated that the patient has had mild anemia dating back a number of years\.    In 1991, her hemoglobin was 11.7 with an MCV of 84. Her MCV through the years has been anywhere from 79-84. The patient has had several iron studies that showed an elevated ferritin, a creatinine of 1.7 as well as serum protein electrophoresis that has failed to show a monoclonal spike.    . B12 levels have been normal.    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    The patient comes today for routine follow up.    At the end dec 2011 she had a severe epistaxis requiring a blood transfusion. She ended up having surgery for nasal polyps In May 2012 she ha had a drop of hr HGB to 8.8 and her creatinine was 1.8 She slowly improved ion her own. SPEp showed a polyclonal gammopathy and her free light chains showed elevations of both the kappa and lambda light chains which goes against clonality.       She had the zoster vaccine in 2008    She is on procrit every 2 weeks when needed    She is know to have a sub-centimeter pulmonary nodule in the right middle lobe, Ct scan in December 2015 showed stability.A CT scan done 3/1027 showed a new nodule, measuring 4.6 mm in the right lung.  She had a Ct scan in early April 2018 which showed stability of thee lung nodules but a new  Probable 3.1 cm lesion in the  lefy kidney. She is due to have an US over the next  few days as ordered by Dr Bennett  She has recurrent epistaxis which is felt to be due to HBP     She comes for follow up to see if she will need procrit.     ALLERGIES: See med card  MEDICATIONS: See MedCard.  PREVIOUS SURGERIES: Thyroid nodule removed many years ago and endometrial  polyp removed in 2009.  Nasal polyps surgery around april 2012  SOCIAL HISTORY: She is  with three children. She lives in [place].  She does not smoke or drink. 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  FAMILY HISTORY: Two sisters have liver cancer, and another one has breast  cancer. Mother had cancer of the gallbladder. Father had a heart attack.  No diabetes in the family.  PAST MEDICAL HISTORY:  1. 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  2. Alpha globin gene rearrangement test showed that she is an alpha  thalassemia carrier.  3. Chronic anemia.  4-elevated creatinine  5. Hypertension.  6. Status post removal of endometrial polyp.  7. Epistaxis-recurrent felt to be due to HBP.  8. hx of epistaxis Dec 2011 ( nasal polyps         Review of Systems   Constitutional: Negative.    HENT: Negative.    Eyes: Negative.    Respiratory: Negative.  Negative for cough and wheezing.    Cardiovascular: Negative.  Negative for chest pain.   Gastrointestinal: Negative.    Genitourinary: Negative.    Neurological: Negative.    Psychiatric/Behavioral: Negative.        Objective:      Physical Exam   Constitutional: She is oriented to person, place, and time. She appears well-developed. No distress.   HENT:   Head: Normocephalic.   Right Ear: Tympanic membrane, external ear and ear canal normal.   Left Ear: Tympanic membrane, external ear and ear canal normal.   Nose: Nose normal. Right sinus exhibits no maxillary sinus tenderness and no frontal sinus tenderness. Left sinus exhibits no maxillary sinus tenderness and no frontal sinus tenderness.   Mouth/Throat: Oropharynx is clear  and moist and mucous membranes are normal.   Teeth normal.  Gums normal.   Eyes: Conjunctivae and lids are normal. Pupils are equal, round, and reactive to light.   Neck: Normal carotid pulses, no hepatojugular reflux and no JVD present. Carotid bruit is not present. No tracheal deviation present. No thyroid mass and no thyromegaly present.   Cardiovascular: Normal rate, regular rhythm, S1 normal, S2 normal, normal heart sounds and intact distal pulses.  Exam reveals no gallop and no friction rub.    No murmur heard.  Carotid exam normal   Pulmonary/Chest: Effort normal and breath sounds normal. No accessory muscle usage. No respiratory distress. She has no wheezes. She has no rales. She exhibits no tenderness.   Abdominal: Soft. Normal appearance. She exhibits no distension and no mass. There is no splenomegaly or hepatomegaly. There is no tenderness. There is no rebound and no guarding.   Musculoskeletal: Normal range of motion. She exhibits no edema or tenderness.        Right hand: Normal.        Left hand: Normal.       Lymphadenopathy:     She has no cervical adenopathy.     She has no axillary adenopathy.        Right: No inguinal and no supraclavicular adenopathy present.        Left: No inguinal and no supraclavicular adenopathy present.   Neurological: She is alert and oriented to person, place, and time. She has normal strength. No cranial nerve deficit. Coordination normal.   Skin: Skin is warm and dry. No rash noted. She is not diaphoretic. No cyanosis or erythema. No pallor. Nails show no clubbing.   Psychiatric: She has a normal mood and affect. Her behavior is normal. Judgment and thought content normal.       Wt Readings from Last 3 Encounters:   04/12/18 61.7 kg (136 lb 0.4 oz)   04/12/18 61.7 kg (136 lb 0.4 oz)   04/06/18 64.4 kg (142 lb)     Temp Readings from Last 3 Encounters:   04/12/18 98 °F (36.7 °C) (Oral)   03/29/18 97.7 °F (36.5 °C)   03/15/18 97.4 °F (36.3 °C) (Oral)     BP Readings from  Last 3 Encounters:   04/12/18 (!) 170/80   04/06/18 (!) 140/80   03/29/18 (!) 141/76     Pulse Readings from Last 3 Encounters:   04/12/18 (!) 115   04/06/18 (!) 128   03/29/18 89       Assessment:       1. Anemia of chronic renal failure, stage 4 (severe)    2. CKD (chronic kidney disease) stage 4, GFR 15-29 ml/min      3-mass left kidney  4-alpha thalassemia carrier  Plan:       Lab Results   Component Value Date    WBC 6.62 04/12/2018    HGB 8.4 (L) 04/12/2018    HCT 25.7 (L) 04/12/2018    MCV 77 (L) 04/12/2018     04/12/2018     Lab Results   Component Value Date    IRON 77 03/15/2018    TIBC 216 (L) 03/15/2018    FERRITIN 175 03/15/2018       Will need to continue procrit.  See me in 4 weeks with a cbc.  Microcytosis explained by alpha thalassemia carrier status  Proceed with US as ordered

## 2018-04-26 ENCOUNTER — INFUSION (OUTPATIENT)
Dept: INFUSION THERAPY | Facility: HOSPITAL | Age: 83
End: 2018-04-26
Attending: INTERNAL MEDICINE
Payer: COMMERCIAL

## 2018-04-26 VITALS
TEMPERATURE: 98 F | DIASTOLIC BLOOD PRESSURE: 93 MMHG | BODY MASS INDEX: 24.88 KG/M2 | HEART RATE: 100 BPM | OXYGEN SATURATION: 100 % | SYSTOLIC BLOOD PRESSURE: 154 MMHG | WEIGHT: 136 LBS

## 2018-04-26 DIAGNOSIS — N18.30 ANEMIA OF CHRONIC RENAL FAILURE, STAGE 3 (MODERATE): Primary | ICD-10-CM

## 2018-04-26 DIAGNOSIS — D63.1 ANEMIA OF CHRONIC RENAL FAILURE, STAGE 3 (MODERATE): Primary | ICD-10-CM

## 2018-04-26 PROCEDURE — 96372 THER/PROPH/DIAG INJ SC/IM: CPT | Mod: PO

## 2018-04-26 PROCEDURE — 63600175 PHARM REV CODE 636 W HCPCS: Mod: JG,PO | Performed by: INTERNAL MEDICINE

## 2018-04-26 RX ADMIN — ERYTHROPOIETIN 40000 UNITS: 40000 INJECTION, SOLUTION INTRAVENOUS; SUBCUTANEOUS at 11:04

## 2018-05-06 ENCOUNTER — HOSPITAL ENCOUNTER (OUTPATIENT)
Facility: HOSPITAL | Age: 83
Discharge: HOME | End: 2018-05-06
Attending: EMERGENCY MEDICINE | Admitting: INTERNAL MEDICINE
Payer: COMMERCIAL

## 2018-05-06 VITALS
HEIGHT: 62 IN | RESPIRATION RATE: 17 BRPM | TEMPERATURE: 98 F | DIASTOLIC BLOOD PRESSURE: 72 MMHG | OXYGEN SATURATION: 97 % | WEIGHT: 153.19 LBS | BODY MASS INDEX: 28.19 KG/M2 | HEART RATE: 67 BPM | SYSTOLIC BLOOD PRESSURE: 147 MMHG

## 2018-05-06 DIAGNOSIS — N18.4 STAGE 4 CHRONIC KIDNEY DISEASE: ICD-10-CM

## 2018-05-06 DIAGNOSIS — M79.89 LEG SWELLING: ICD-10-CM

## 2018-05-06 DIAGNOSIS — R06.09 DOE (DYSPNEA ON EXERTION): Primary | ICD-10-CM

## 2018-05-06 PROBLEM — R60.0 LOWER EXTREMITY EDEMA: Status: ACTIVE | Noted: 2018-05-06

## 2018-05-06 PROBLEM — E87.20 METABOLIC ACIDOSIS: Status: ACTIVE | Noted: 2018-05-06

## 2018-05-06 LAB
ALBUMIN SERPL BCP-MCNC: 2.9 G/DL
ALLENS TEST: ABNORMAL
ALP SERPL-CCNC: 78 U/L
ALT SERPL W/O P-5'-P-CCNC: 13 U/L
ANION GAP SERPL CALC-SCNC: 15 MMOL/L
AST SERPL-CCNC: 25 U/L
BASOPHILS # BLD AUTO: 0.01 K/UL
BASOPHILS NFR BLD: 0.2 %
BILIRUB SERPL-MCNC: 0.3 MG/DL
BILIRUB UR QL STRIP: NEGATIVE
BNP SERPL-MCNC: 320 PG/ML
BUN SERPL-MCNC: 54 MG/DL
CALCIUM SERPL-MCNC: 8.8 MG/DL
CHLORIDE SERPL-SCNC: 108 MMOL/L
CK SERPL-CCNC: 76 U/L
CLARITY UR: CLEAR
CO2 SERPL-SCNC: 16 MMOL/L
COLOR UR: YELLOW
CREAT SERPL-MCNC: 4.8 MG/DL
DELSYS: ABNORMAL
DIFFERENTIAL METHOD: ABNORMAL
EOSINOPHIL # BLD AUTO: 0.2 K/UL
EOSINOPHIL NFR BLD: 3.3 %
ERYTHROCYTE [DISTWIDTH] IN BLOOD BY AUTOMATED COUNT: 19.1 %
EST. GFR  (AFRICAN AMERICAN): 9 ML/MIN/1.73 M^2
EST. GFR  (NON AFRICAN AMERICAN): 8 ML/MIN/1.73 M^2
FIO2: 21
GLUCOSE SERPL-MCNC: 92 MG/DL
GLUCOSE UR QL STRIP: NEGATIVE
HCO3 UR-SCNC: 18.8 MMOL/L (ref 24–28)
HCT VFR BLD AUTO: 23.3 %
HGB BLD-MCNC: 7.6 G/DL
HGB UR QL STRIP: NEGATIVE
KETONES UR QL STRIP: NEGATIVE
LEUKOCYTE ESTERASE UR QL STRIP: ABNORMAL
LYMPHOCYTES # BLD AUTO: 1.6 K/UL
LYMPHOCYTES NFR BLD: 31.6 %
MCH RBC QN AUTO: 24.1 PG
MCHC RBC AUTO-ENTMCNC: 32.6 G/DL
MCV RBC AUTO: 74 FL
MICROSCOPIC COMMENT: ABNORMAL
MODE: ABNORMAL
MONOCYTES # BLD AUTO: 0.9 K/UL
MONOCYTES NFR BLD: 17.5 %
NEUTROPHILS # BLD AUTO: 2.5 K/UL
NEUTROPHILS NFR BLD: 47.4 %
NITRITE UR QL STRIP: NEGATIVE
PCO2 BLDA: 27.6 MMHG (ref 35–45)
PH SMN: 7.44 [PH] (ref 7.35–7.45)
PH UR STRIP: 8 [PH] (ref 5–8)
PLATELET # BLD AUTO: 245 K/UL
PMV BLD AUTO: 8.7 FL
PO2 BLDA: 86 MMHG (ref 80–100)
POC BE: -5 MMOL/L
POC SATURATED O2: 97 % (ref 95–100)
POTASSIUM SERPL-SCNC: 4.7 MMOL/L
PROT SERPL-MCNC: 7.9 G/DL
PROT UR QL STRIP: ABNORMAL
RBC # BLD AUTO: 3.15 M/UL
SAMPLE: ABNORMAL
SITE: ABNORMAL
SODIUM SERPL-SCNC: 139 MMOL/L
SP GR UR STRIP: 1.01 (ref 1–1.03)
SQUAMOUS #/AREA URNS HPF: 5 /HPF
TROPONIN I SERPL DL<=0.01 NG/ML-MCNC: 0.01 NG/ML
URN SPEC COLLECT METH UR: ABNORMAL
UROBILINOGEN UR STRIP-ACNC: NEGATIVE EU/DL
WBC # BLD AUTO: 5.19 K/UL
WBC #/AREA URNS HPF: 20 /HPF (ref 0–5)

## 2018-05-06 PROCEDURE — 99900035 HC TECH TIME PER 15 MIN (STAT)

## 2018-05-06 PROCEDURE — 36600 WITHDRAWAL OF ARTERIAL BLOOD: CPT

## 2018-05-06 PROCEDURE — G0378 HOSPITAL OBSERVATION PER HR: HCPCS

## 2018-05-06 PROCEDURE — 99225 PR SUBSEQUENT OBSERVATION CARE,LEVEL II: CPT | Mod: ,,, | Performed by: INTERNAL MEDICINE

## 2018-05-06 PROCEDURE — 84484 ASSAY OF TROPONIN QUANT: CPT

## 2018-05-06 PROCEDURE — 93306 TTE W/DOPPLER COMPLETE: CPT | Mod: 26,,, | Performed by: INTERNAL MEDICINE

## 2018-05-06 PROCEDURE — 93306 TTE W/DOPPLER COMPLETE: CPT

## 2018-05-06 PROCEDURE — 81000 URINALYSIS NONAUTO W/SCOPE: CPT

## 2018-05-06 PROCEDURE — 99285 EMERGENCY DEPT VISIT HI MDM: CPT | Mod: 25

## 2018-05-06 PROCEDURE — 80053 COMPREHEN METABOLIC PANEL: CPT

## 2018-05-06 PROCEDURE — 85025 COMPLETE CBC W/AUTO DIFF WBC: CPT

## 2018-05-06 PROCEDURE — 96374 THER/PROPH/DIAG INJ IV PUSH: CPT

## 2018-05-06 PROCEDURE — 63600175 PHARM REV CODE 636 W HCPCS: Performed by: EMERGENCY MEDICINE

## 2018-05-06 PROCEDURE — 82550 ASSAY OF CK (CPK): CPT

## 2018-05-06 PROCEDURE — 93010 ELECTROCARDIOGRAM REPORT: CPT | Mod: ,,, | Performed by: INTERNAL MEDICINE

## 2018-05-06 PROCEDURE — 83880 ASSAY OF NATRIURETIC PEPTIDE: CPT

## 2018-05-06 PROCEDURE — 82803 BLOOD GASES ANY COMBINATION: CPT

## 2018-05-06 RX ORDER — ACETAMINOPHEN 325 MG/1
650 TABLET ORAL EVERY 8 HOURS PRN
Status: DISCONTINUED | OUTPATIENT
Start: 2018-05-06 | End: 2018-05-06 | Stop reason: HOSPADM

## 2018-05-06 RX ORDER — FUROSEMIDE 10 MG/ML
20 INJECTION INTRAMUSCULAR; INTRAVENOUS
Status: COMPLETED | OUTPATIENT
Start: 2018-05-06 | End: 2018-05-06

## 2018-05-06 RX ORDER — ONDANSETRON 2 MG/ML
4 INJECTION INTRAMUSCULAR; INTRAVENOUS EVERY 12 HOURS PRN
Status: DISCONTINUED | OUTPATIENT
Start: 2018-05-06 | End: 2018-05-06 | Stop reason: HOSPADM

## 2018-05-06 RX ORDER — FUROSEMIDE 20 MG/1
20 TABLET ORAL DAILY PRN
Qty: 30 TABLET | Refills: 1 | Status: SHIPPED | OUTPATIENT
Start: 2018-05-06 | End: 2019-05-06

## 2018-05-06 RX ORDER — FUROSEMIDE 20 MG/1
20 TABLET ORAL DAILY PRN
Qty: 60 TABLET | Refills: 11 | Status: SHIPPED | OUTPATIENT
Start: 2018-05-06 | End: 2018-05-06

## 2018-05-06 RX ADMIN — FUROSEMIDE 20 MG: 10 INJECTION, SOLUTION INTRAMUSCULAR; INTRAVENOUS at 11:05

## 2018-05-06 NOTE — H&P
[facility] Hospital Medicine  History & Physical    Patient Name: Gloria Sanz  MRN: [id number]  Admission Date: 5/6/2018  Attending Physician: Solomon Arellano MD   Primary Care Provider: Derrick Desouza MD    Patient information was obtained from patient and ER records.     Subjective:     Principal Problem:Lower extremity edema    Chief Complaint:   Chief Complaint   Patient presents with    Leg Swelling     pt c/o BLE swelling x2 days; pt denies SOB, CP        HPI: Gloria Sanz is a 86 year old female with history of HTN, GERD, AOCD, CKD III, and left kidney mass who presents to ED with worsening lower extremity edema. Symptoms are intermittent and exacerbated by increased sodium intake. She denies associated dyspnea, PND, or orthopnea. Family also reports that patient drinks lots of water. Work up in ED revealed chronic anemia, worsening metabolic acidosis, and CKD. CXR  Unremarkable. Patient has received 20mg Lasix IV in ED. She has been admitted to Observation for further evaluation.       Past Medical History:   Diagnosis Date    Anemia     sickle cell trait, alpha thalessemia    Anemia of chronic renal failure 5/12/2014    Asthma, chronic     Cataract     GERD (gastroesophageal reflux disease)     Gout, arthritis     Helicobacter pylori gastritis     Hypertension     Osteoarthritis     Pulmonary HTN     Renal insufficiency      Past Surgical History:   Procedure Laterality Date    CHOLECYSTECTOMY      endometrial polyp      FETAL BLOOD TRANSFUSION  12/11/2015    2 pints    THYROID SURGERY         Review of patient's allergies indicates:   Allergen Reactions    Allopurinol analogues Rash and Other (See Comments)     Sores in mouth  Sores in mouth  Sores in mouth    Diflucan [fluconazole] Swelling    Amlodipine Edema and Swelling    Ace inhibitors Nausea And Vomiting    Metronidazole Nausea And Vomiting    Phenytoin sodium extended Nausea And Vomiting       No  current facility-administered medications on file prior to encounter.      Current Outpatient Prescriptions on File Prior to Encounter   Medication Sig    albuterol 90 mcg/actuation inhaler Inhale 2 puffs into the lungs every 6 (six) hours as needed for Wheezing.    ammonium lactate (AMLACTIN) 12 % lotion Use daily.  Apply to damp skin after bathing.    B complex vitamins (B COMPLEX) TbSR Take by mouth. 1 Tablet Extended Release Oral Every day    CALCIUM CARBONATE/VITAMIN D3 (CALCIUM 500 + D, D3, ORAL) Take by mouth. 1 Tablet Oral Twice a day    cloNIDine (CATAPRES) 0.3 MG tablet take 1 tablet by mouth twice a day    diltiaZEM (CARDIZEM CD) 180 MG 24 hr capsule take 1 capsule by mouth once daily    docusate sodium (COLACE) 100 MG capsule Take 1 capsule (100 mg total) by mouth 2 (two) times daily as needed for Constipation.    epoetin traci (PROCRIT) 40,000 unit/mL injection Inject 40,000 Units into the skin every 14 (fourteen) days. Or as instructed by MD    fluticasone (FLONASE) 50 mcg/actuation nasal spray 2 sprays by Each Nare route daily as needed.    hydrALAZINE (APRESOLINE) 50 MG tablet Take 50 mg by mouth.    ketoconazole (NIZORAL) 2 % shampoo Wash hair with medicated shampoo at least 2x/week - let sit on scalp at least 5 minutes prior to rinsing    senna-docusate 8.6-50 mg (PERICOLACE) 8.6-50 mg per tablet Take by mouth.    sodium bicarbonate 650 MG tablet take 1 tablet by mouth three times a day    SODIUM BICARBONATE ORAL Take 1 tablet by mouth.    triamcinolone acetonide 0.1% (KENALOG) 0.1 % ointment apply to affected area twice a day    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 3 nights/week    polyethylene glycol (GLYCOLAX) 17 gram PwPk TAKE 17 GRAMS WITH WATER BY MOUTH DAILY     Family History     Problem Relation (Age of Onset)    Breast cancer Sister    Cancer Mother, Sister    Glaucoma Sister    Liver cancer Mother, Sister    Thyroid disease Sister        Social History Main Topics     Smoking status: Never Smoker    Smokeless tobacco: Never Used    Alcohol use No    Drug use: No    Sexual activity: Not on file     Review of Systems   Constitutional: Negative for activity change, diaphoresis, fatigue and unexpected weight change.   HENT: Negative for congestion, ear pain and sore throat.    Eyes: Negative.    Respiratory: Negative for shortness of breath and wheezing.    Cardiovascular: Positive for leg swelling. Negative for chest pain and palpitations.   Gastrointestinal: Negative for abdominal pain, constipation, diarrhea and vomiting.   Endocrine: Negative.    Genitourinary: Negative for flank pain, hematuria and urgency.   Musculoskeletal: Negative for joint swelling and neck pain.   Skin: Negative for pallor.   Neurological: Positive for weakness. Negative for seizures, syncope and light-headedness.   Hematological: Negative.    Psychiatric/Behavioral: Negative.       Objective:     Vital Signs (Most Recent):  Temp: 98.4 °F (36.9 °C) (05/06/18 0904)  Pulse: 62 (05/06/18 1200)  Resp: 20 (05/06/18 1200)  BP: (!) 150/81 (05/06/18 1200)  SpO2: 99 % (05/06/18 1200) Vital Signs (24h Range):  Temp:  [98.4 °F (36.9 °C)] 98.4 °F (36.9 °C)  Pulse:  [60-65] 62  Resp:  [18-20] 20  SpO2:  [97 %-100 %] 99 %  BP: (117-156)/(69-81) 150/81     Weight: 68.4 kg (150 lb 14.4 oz)  Body mass index is 27.6 kg/m².    Physical Exam   Constitutional: She is oriented to person, place, and time. She appears well-developed and well-nourished.   Elderly, chronically ill     HENT:   Head: Normocephalic and atraumatic.   Eyes: EOM are normal.   Neck: Normal range of motion. Neck supple.   Cardiovascular: Normal rate, regular rhythm and normal heart sounds.    No murmur heard.  Pulmonary/Chest: Effort normal and breath sounds normal. No respiratory distress.   Abdominal: Soft. Bowel sounds are normal. She exhibits no distension. There is no tenderness.   Musculoskeletal: Normal range of motion. She exhibits edema (+2  lower swelling involving bilateral feet and ankles).   Neurological: She is alert and oriented to person, place, and time.   Skin: Skin is warm and dry.         CRANIAL NERVES     CN III, IV, VI   Extraocular motions are normal.      Significant Labs:   CBC:   Recent Labs  Lab 05/06/18  0930   WBC 5.19   HGB 7.6*   HCT 23.3*        CMP:   Recent Labs  Lab 05/06/18  0930      K 4.7      CO2 16*   GLU 92   BUN 54*   CREATININE 4.8*   CALCIUM 8.8   PROT 7.9   ALBUMIN 2.9*   BILITOT 0.3   ALKPHOS 78   AST 25   ALT 13   ANIONGAP 15   EGFRNONAA 8*       Significant Imaging:   Imaging Results          X-Ray Chest AP Portable (Final result)  Result time 05/06/18 09:53:44    Final result by Haile Tejada MD (05/06/18 09:53:44)                 Impression:      No acute findings.      Electronically signed by: Haile Tejada MD  Date:    05/06/2018  Time:    09:53             Narrative:    EXAMINATION:  XR CHEST AP PORTABLE    CLINICAL HISTORY:  Acute shortness of breath;    TECHNIQUE:  AP view of the chest was performed.    COMPARISON:  None    FINDINGS:  The cardiac and mediastinal silhouettes appear within normal limits.   The lungs are clear bilaterally.  No acute osseous findings demonstrated.                                  Assessment/Plan:     * Lower extremity edema    -In setting of CKD IV and diastolic heart failure  -Patient admits to noncompliance with sodium and water intake  -CXR did not show evidence of pulmonary edema   -received 20mg Lasix in ED  -Case discussed with Dr. Munguia who will evaluate patient today          Metabolic acidosis    -related to CKD  -resume sodium bicarbonate.   -Nephrology has been consulted        CKD (chronic kidney disease) stage 4, GFR 15-29 ml/min    -Nephrology consulted  -monitor urine output          Anemia of chronic renal failure    -patient has history of alpha thalassemia followed by Dr. Clark  -receives Procrit outpatient as well  -H/H 7/23 today.          Hypertension    -resume home medications          VTE Risk Mitigation         Ordered     IP VTE HIGH RISK PATIENT  Once      05/06/18 1132     Place sequential compression device  Until discontinued      05/06/18 1132          Amanda Sigala NP  Department of Hospital Medicine   [facility]

## 2018-05-06 NOTE — HPI
"86 year old female with anemia, asthma, GERD, gout, HTN, H. Pylori, pulomary HTN, CKD 5  presents to [facility] with LE edema.     Nephrology was consulted to help with patient's renal care while she is admitted at [facility]. I saw and examined patient in her hospital room. Patient reports that she suffers from chronic mild LE edema. She reports that her LE edema has worsened today. This is associated with foot "burning". Patient also reports SOB with exercise which is chronic. No other complaints at present.     [facility] at [place] by myself for CKD 4/5. Last vist was when her creatinine was 12/15/16 when her creatinine was 4.1.   "

## 2018-05-06 NOTE — SUBJECTIVE & OBJECTIVE
Past Medical History:   Diagnosis Date    Anemia     sickle cell trait, alpha thalessemia    Anemia of chronic renal failure 5/12/2014    Asthma, chronic     Cataract     GERD (gastroesophageal reflux disease)     Gout, arthritis     Helicobacter pylori gastritis     Hypertension     Osteoarthritis     Pulmonary HTN     Renal insufficiency      Past Surgical History:   Procedure Laterality Date    CHOLECYSTECTOMY      endometrial polyp      FETAL BLOOD TRANSFUSION  12/11/2015    2 pints    THYROID SURGERY         Review of patient's allergies indicates:   Allergen Reactions    Allopurinol analogues Rash and Other (See Comments)     Sores in mouth  Sores in mouth  Sores in mouth    Diflucan [fluconazole] Swelling    Amlodipine Edema and Swelling    Ace inhibitors Nausea And Vomiting    Metronidazole Nausea And Vomiting    Phenytoin sodium extended Nausea And Vomiting       No current facility-administered medications on file prior to encounter.      Current Outpatient Prescriptions on File Prior to Encounter   Medication Sig    albuterol 90 mcg/actuation inhaler Inhale 2 puffs into the lungs every 6 (six) hours as needed for Wheezing.    ammonium lactate (AMLACTIN) 12 % lotion Use daily.  Apply to damp skin after bathing.    B complex vitamins (B COMPLEX) TbSR Take by mouth. 1 Tablet Extended Release Oral Every day    CALCIUM CARBONATE/VITAMIN D3 (CALCIUM 500 + D, D3, ORAL) Take by mouth. 1 Tablet Oral Twice a day    cloNIDine (CATAPRES) 0.3 MG tablet take 1 tablet by mouth twice a day    diltiaZEM (CARDIZEM CD) 180 MG 24 hr capsule take 1 capsule by mouth once daily    docusate sodium (COLACE) 100 MG capsule Take 1 capsule (100 mg total) by mouth 2 (two) times daily as needed for Constipation.    epoetin traci (PROCRIT) 40,000 unit/mL injection Inject 40,000 Units into the skin every 14 (fourteen) days. Or as instructed by MD    fluticasone (FLONASE) 50 mcg/actuation nasal spray 2 sprays  by Each Nare route daily as needed.    hydrALAZINE (APRESOLINE) 50 MG tablet Take 50 mg by mouth.    ketoconazole (NIZORAL) 2 % shampoo Wash hair with medicated shampoo at least 2x/week - let sit on scalp at least 5 minutes prior to rinsing    senna-docusate 8.6-50 mg (PERICOLACE) 8.6-50 mg per tablet Take by mouth.    sodium bicarbonate 650 MG tablet take 1 tablet by mouth three times a day    SODIUM BICARBONATE ORAL Take 1 tablet by mouth.    triamcinolone acetonide 0.1% (KENALOG) 0.1 % ointment apply to affected area twice a day    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 3 nights/week    polyethylene glycol (GLYCOLAX) 17 gram PwPk TAKE 17 GRAMS WITH WATER BY MOUTH DAILY     Family History     Problem Relation (Age of Onset)    Breast cancer Sister    Cancer Mother, Sister    Glaucoma Sister    Liver cancer Mother, Sister    Thyroid disease Sister        Social History Main Topics    Smoking status: Never Smoker    Smokeless tobacco: Never Used    Alcohol use No    Drug use: No    Sexual activity: Not on file     Review of Systems   Constitutional: Negative for activity change, diaphoresis, fatigue and unexpected weight change.   HENT: Negative for congestion, ear pain and sore throat.    Eyes: Negative.    Respiratory: Negative for shortness of breath and wheezing.    Cardiovascular: Positive for leg swelling. Negative for chest pain and palpitations.   Gastrointestinal: Negative for abdominal pain, constipation, diarrhea and vomiting.   Endocrine: Negative.    Genitourinary: Negative for flank pain, hematuria and urgency.   Musculoskeletal: Negative for joint swelling and neck pain.   Skin: Negative for pallor.   Neurological: Positive for weakness. Negative for seizures, syncope and light-headedness.   Hematological: Negative.    Psychiatric/Behavioral: Negative.       Objective:     Vital Signs (Most Recent):  Temp: 98.4 °F (36.9 °C) (05/06/18 0904)  Pulse: 62 (05/06/18 1200)  Resp: 20 (05/06/18  1200)  BP: (!) 150/81 (05/06/18 1200)  SpO2: 99 % (05/06/18 1200) Vital Signs (24h Range):  Temp:  [98.4 °F (36.9 °C)] 98.4 °F (36.9 °C)  Pulse:  [60-65] 62  Resp:  [18-20] 20  SpO2:  [97 %-100 %] 99 %  BP: (117-156)/(69-81) 150/81     Weight: 68.4 kg (150 lb 14.4 oz)  Body mass index is 27.6 kg/m².    Physical Exam   Constitutional: She is oriented to person, place, and time. She appears well-developed and well-nourished.   Elderly, chronically ill     HENT:   Head: Normocephalic and atraumatic.   Eyes: EOM are normal.   Neck: Normal range of motion. Neck supple.   Cardiovascular: Normal rate, regular rhythm and normal heart sounds.    No murmur heard.  Pulmonary/Chest: Effort normal and breath sounds normal. No respiratory distress.   Abdominal: Soft. Bowel sounds are normal. She exhibits no distension. There is no tenderness.   Musculoskeletal: Normal range of motion. She exhibits edema (+2 lower swelling involving bilateral feet and ankles).   Neurological: She is alert and oriented to person, place, and time.   Skin: Skin is warm and dry.         CRANIAL NERVES     CN III, IV, VI   Extraocular motions are normal.      Significant Labs:   CBC:   Recent Labs  Lab 05/06/18  0930   WBC 5.19   HGB 7.6*   HCT 23.3*        CMP:   Recent Labs  Lab 05/06/18  0930      K 4.7      CO2 16*   GLU 92   BUN 54*   CREATININE 4.8*   CALCIUM 8.8   PROT 7.9   ALBUMIN 2.9*   BILITOT 0.3   ALKPHOS 78   AST 25   ALT 13   ANIONGAP 15   EGFRNONAA 8*       Significant Imaging:   Imaging Results          X-Ray Chest AP Portable (Final result)  Result time 05/06/18 09:53:44    Final result by Haile Tejada MD (05/06/18 09:53:44)                 Impression:      No acute findings.      Electronically signed by: Haile Tejada MD  Date:    05/06/2018  Time:    09:53             Narrative:    EXAMINATION:  XR CHEST AP PORTABLE    CLINICAL HISTORY:  Acute shortness of breath;    TECHNIQUE:  AP view of the chest was  performed.    COMPARISON:  None    FINDINGS:  The cardiac and mediastinal silhouettes appear within normal limits.   The lungs are clear bilaterally.  No acute osseous findings demonstrated.

## 2018-05-06 NOTE — CONSULTS
"[facility]  Nephrology  Consult Note    Patient Name: Gloria Sanz  MRN: [id number]  Admission Date: 5/6/2018  Hospital Length of Stay: 0 days  Attending Provider: Solomon Arellano MD   Primary Care Physician: Derrick Desouza MD  Principal Problem:Lower extremity edema    Consults  Subjective:     HPI: 86 year old female with anemia, asthma, GERD, gout, HTN, H. Pylori, pulomary HTN, CKD 5  p[place] [facility] [place].     Nephrology was consulted to help with patient's renal care while she is admitted at [facility]. I saw and examined patient in her hospital room. Patient reports that she suffers from chronic mild LE edema. She reports that her LE edema has worsened today. This is associated with foot "burning". Patient also reports SOB with exercise which is chronic. No other complaints at present.     Patient is followed in [facility] renal clinic at [place] by myself for CKD 4/5. Last vist was when her creatinine was 12/15/16 when her creatinine was 4.1.     Past Medical History:   Diagnosis Date    Anemia     sickle cell trait, alpha thalessemia    Anemia of chronic renal failure 5/12/2014    Asthma, chronic     Cataract     GERD (gastroesophageal reflux disease)     Gout, arthritis     Helicobacter pylori gastritis     Hypertension     Osteoarthritis     Pulmonary HTN     Renal insufficiency        Past Surgical History:   Procedure Laterality Date    CHOLECYSTECTOMY      endometrial polyp      FETAL BLOOD TRANSFUSION  12/11/2015    2 pints    THYROID SURGERY         Review of patient's allergies indicates:   Allergen Reactions    Allopurinol analogues Rash and Other (See Comments)     Sores in mouth  Sores in mouth  Sores in mouth    Diflucan [fluconazole] Swelling    Amlodipine Edema and Swelling    Ace inhibitors Nausea And Vomiting    Metronidazole Nausea And Vomiting    Phenytoin sodium extended Nausea And Vomiting     Current Facility-Administered Medications "   Medication Frequency    acetaminophen tablet 650 mg Q8H PRN    ondansetron injection 4 mg Q12H PRN    promethazine (PHENERGAN) 6.25 mg in dextrose 5 % 50 mL IVPB Q6H PRN     Family History     Problem Relation (Age of Onset)    Breast cancer Sister    Cancer Mother, Sister    Glaucoma Sister    Liver cancer Mother, Sister    Thyroid disease Sister        Social History Main Topics    Smoking status: Never Smoker    Smokeless tobacco: Never Used    Alcohol use No    Drug use: No    Sexual activity: Not on file     Review of Systems   Constitutional: Negative for fatigue and fever.   HENT: Negative for congestion.    Eyes: Negative for visual disturbance.   Respiratory: Negative for cough, shortness of breath and wheezing.    Cardiovascular: Positive for leg swelling. Negative for chest pain and palpitations.   Gastrointestinal: Negative for abdominal pain, diarrhea, nausea and vomiting.   Genitourinary: Negative for difficulty urinating and dysuria.   Musculoskeletal: Negative for joint swelling.        She reports LE pain/burning.    Skin: Negative for rash.   Neurological: Negative for weakness and headaches.     Objective:     Vital Signs (Most Recent):  Temp: 97.5 °F (36.4 °C) (05/06/18 1414)  Pulse: 67 (05/06/18 1509)  Resp: 17 (05/06/18 1414)  BP: (!) 147/72 (05/06/18 1414)  SpO2: 97 % (05/06/18 1414)  O2 Device (Oxygen Therapy): room air (05/06/18 1414) Vital Signs (24h Range):  Temp:  [97.5 °F (36.4 °C)-98.4 °F (36.9 °C)] 97.5 °F (36.4 °C)  Pulse:  [60-96] 67  Resp:  [17-21] 17  SpO2:  [97 %-100 %] 97 %  BP: (117-163)/(69-81) 147/72     Weight: 69.5 kg (153 lb 3.2 oz) (05/06/18 1435)  Body mass index is 28.02 kg/m².  Body surface area is 1.74 meters squared.    No intake/output data recorded.    Physical Exam   Constitutional: She appears well-developed and well-nourished.   HENT:   Head: Normocephalic.   Eyes: Pupils are equal, round, and reactive to light.   Neck: No thyromegaly present.    Cardiovascular: Normal rate and regular rhythm.  Exam reveals no friction rub.    Pulmonary/Chest: Effort normal and breath sounds normal. She has no wheezes. She exhibits no tenderness.   Abdominal: Soft. Bowel sounds are normal. She exhibits no distension. There is no tenderness.   Musculoskeletal: She exhibits edema.   Mild bilateral ankle edema.    Lymphadenopathy:     She has no cervical adenopathy.   Neurological: She is alert.   Skin: Skin is warm and dry. No rash noted.   Psychiatric: She has a normal mood and affect.       Significant Labs:  Lab Results   Component Value Date    CREATININE 4.8 (H) 05/06/2018    BUN 54 (H) 05/06/2018     05/06/2018    K 4.7 05/06/2018     05/06/2018    CO2 16 (L) 05/06/2018     Lab Results   Component Value Date    .0 (H) 05/02/2016    CALCIUM 8.8 05/06/2018    PHOS 4.2 12/20/2016       Lab Results   Component Value Date    ALBUMIN 2.9 (L) 05/06/2018     Lab Results   Component Value Date    WBC 5.19 05/06/2018    HGB 7.6 (L) 05/06/2018    HCT 23.3 (L) 05/06/2018    MCV 74 (L) 05/06/2018     05/06/2018         Significant Imaging:  Imaging Results          X-Ray Chest AP Portable (Final result)  Result time 05/06/18 09:53:44    Final result by Haile Tejada MD (05/06/18 09:53:44)                 Impression:      No acute findings.      Electronically signed by: Haile Tejada MD  Date:    05/06/2018  Time:    09:53             Narrative:    EXAMINATION:  XR CHEST AP PORTABLE    CLINICAL HISTORY:  Acute shortness of breath;    TECHNIQUE:  AP view of the chest was performed.    COMPARISON:  None    FINDINGS:  The cardiac and mediastinal silhouettes appear within normal limits.   The lungs are clear bilaterally.  No acute osseous findings demonstrated.                                  Assessment/Plan:     * Lower extremity edema    Mild. Lasix 20 mg po daily, prn. Patient was advised to avoid high salt foods.         Metabolic acidosis    Continue sodium  bicarbonate.         CKD (chronic kidney disease) stage 4, GFR 15-29 ml/min    Patient is seen in renal clinic at [facility] for CKD 4/5. Patient not a candidate for hemodialysis. Will follow her as outpatient.  Good hydration (60 ounces of fluids per day) and avoidance of NSAIDS was emphasized.         Anemia of chronic renal failure    Continue outpatient follow-up with Hematology.             Thank you for your consult. I will follow-up with patient. Please contact us if you have any additional questions.    Dutch Munguia MD  Nephrology  [facility]

## 2018-05-06 NOTE — ASSESSMENT & PLAN NOTE
-In setting of CKD IV and diastolic heart failure  -Patient admits to noncompliance with sodium and water intake  -CXR did not show evidence of pulmonary edema   -received 20mg Lasix in ED  -Case discussed with Dr. Munguia who will evaluate patient today

## 2018-05-06 NOTE — DISCHARGE SUMMARY
[facility] Hospital Medicine  Discharge Summary      Patient Name: Gloria Sanz  MRN: [id number]  Admission Date: 5/6/2018  Hospital Length of Stay: 0 days  Discharge Date and Time:  05/06/2018 4:08 PM  Attending Physician: Solomon Arellano MD   Discharging Provider: Amanda Sigala NP  Primary Care Provider: Derrick Desouza MD      HPI:   Gloria Sanz is a 86 year old female with history of HTN, GERD, AOCD, CKD III, and left kidney mass who presents to ED with worsening lower extremity edema. Symptoms are intermittent and exacerbated by increased sodium intake. She denies associated dyspnea, PND, or orthopnea. Family also reports that patient drinks lots of water. Work up in ED revealed chronic anemia, worsening metabolic acidosis, and CKD. CXR  Unremarkable. Patient has received 20mg Lasix IV in ED. She has been admitted to Observation for further evaluation.       * No surgery found *      Hospital Course:   Gloria Sanz is a 86 year old female with history of CKD IV who was admitted to [facility] for lower extremity edema and pain while ambulating. She was gently diuresed. She admits to noncompliance with sodium and fluid restriction and was counseled on this. She was continued on sodium bicarbonate for metabolic acidosis. Nephrology service assisted with care. She will continue Lasix 20mg daily as needed for leg swelling. Complains of feet pain with paraesthesias are concerning for underlying neuropathy. Tylenol is okay for pain, but patient was asked to avoid NSAIDS. Will not initiate gabapentin due to severity of renal failure. She will need to follow up with PCP for further work up regarding this. She did not have wounds or ulcers contributing to pain. Appt has been scheduled with Dr. Munguia for 5/11/2018. Patient seen and examined on date of discharge and deemed suitable.      Consults:   Consults         Status Ordering Provider     Inpatient consult to Nephrology   Once     Provider:  Dutch Munguia MD    Acknowledged ABHINAV RUBI          No new Assessment & Plan notes have been filed under this hospital service since the last note was generated.  Service: Hospital Medicine    Final Active Diagnoses:    Diagnosis Date Noted POA    PRINCIPAL PROBLEM:  Lower extremity edema [R60.0] 05/06/2018 Yes    Metabolic acidosis [E87.2] 05/06/2018 Yes    CKD (chronic kidney disease) stage 4, GFR 15-29 ml/min [N18.4] 07/26/2016 Yes    Anemia of chronic renal failure [N18.9, D63.1] 05/12/2014 Yes    Hypertension [I10]  Yes      Problems Resolved During this Admission:    Diagnosis Date Noted Date Resolved POA       Discharged Condition: stable    Disposition: Home or Self Care    Follow Up:  Follow-up Information     Derrick Desouza MD.    Specialty:  Family Medicine  Contact information:  [address] [address] [phone]                 Patient Instructions:     Activity as tolerated         Significant Diagnostic Studies: Labs:   CMP   Recent Labs  Lab 05/06/18  0930      K 4.7      CO2 16*   GLU 92   BUN 54*   CREATININE 4.8*   CALCIUM 8.8   PROT 7.9   ALBUMIN 2.9*   BILITOT 0.3   ALKPHOS 78   AST 25   ALT 13   ANIONGAP 15   ESTGFRAFRICA 9*   EGFRNONAA 8*    and CBC   Recent Labs  Lab 05/06/18  0930   WBC 5.19   HGB 7.6*   HCT 23.3*          Pending Diagnostic Studies:     None         Medications:  Reconciled Home Medications:      Medication List      START taking these medications    furosemide 20 MG tablet  Commonly known as:  LASIX  Take 1 tablet (20 mg total) by mouth daily as needed.        CONTINUE taking these medications    albuterol 90 mcg/actuation inhaler  Inhale 2 puffs into the lungs every 6 (six) hours as needed for Wheezing.     ammonium lactate 12 % lotion  Commonly known as:  AMLACTIN  Use daily.  Apply to damp skin after bathing.     B COMPLEX Tbsr  Generic drug:  vitamin B complex  Take by mouth. 1 Tablet Extended  Release Oral Every day     CALCIUM 500 + D (D3) ORAL  Take by mouth. 1 Tablet Oral Twice a day     cloNIDine 0.3 MG tablet  Commonly known as:  CATAPRES  take 1 tablet by mouth twice a day     diltiaZEM 180 MG 24 hr capsule  Commonly known as:  CARDIZEM CD  take 1 capsule by mouth once daily     docusate sodium 100 MG capsule  Commonly known as:  COLACE  Take 1 capsule (100 mg total) by mouth 2 (two) times daily as needed for Constipation.     fluticasone 50 mcg/actuation nasal spray  Commonly known as:  FLONASE  2 sprays by Each Nare route daily as needed.     hydrALAZINE 50 MG tablet  Commonly known as:  APRESOLINE  Take 50 mg by mouth.     ketoconazole 2 % shampoo  Commonly known as:  NIZORAL  Wash hair with medicated shampoo at least 2x/week - let sit on scalp at least 5 minutes prior to rinsing     polyethylene glycol 17 gram Pwpk  Commonly known as:  GLYCOLAX  TAKE 17 GRAMS WITH WATER BY MOUTH DAILY     PROCRIT 40,000 unit/mL injection  Generic drug:  epoetin traci  Inject 40,000 Units into the skin every 14 (fourteen) days. Or as instructed by MD     senna-docusate 8.6-50 mg 8.6-50 mg per tablet  Commonly known as:  PERICOLACE  Take by mouth.     * SODIUM BICARBONATE ORAL  Take 1 tablet by mouth.     * sodium bicarbonate 650 MG tablet  take 1 tablet by mouth three times a day     triamcinolone acetonide 0.1% 0.1 % ointment  Commonly known as:  KENALOG  apply to affected area twice a day     urea 40 % Crea  Commonly known as:  CARMOL  AAA of hands 3 nights/week        * This list has 2 medication(s) that are the same as other medications prescribed for you. Read the directions carefully, and ask your doctor or other care provider to review them with you.                Indwelling Lines/Drains at time of discharge:   Lines/Drains/Airways          No matching active lines, drains, or airways          Time spent on the discharge of patient: >35 minutes  Patient was seen and examined on the date of discharge and  determined to be suitable for discharge.      Amanda Sigala NP  Department of Hospital Medicine  [facility]

## 2018-05-06 NOTE — ED PROVIDER NOTES
SCRIBE #1 NOTE: I, Obinna Al, am scribing for, and in the presence of, Scott Hankins MD. I have scribed the entire note.      History      Chief Complaint   Patient presents with    Leg Swelling     pt c/o BLE swelling x2 days; pt denies SOB, CP       Review of patient's allergies indicates:   Allergen Reactions    Allopurinol analogues Rash and Other (See Comments)     Sores in mouth  Sores in mouth  Sores in mouth    Diflucan [fluconazole] Swelling    Amlodipine Edema and Swelling    Ace inhibitors Nausea And Vomiting    Metronidazole Nausea And Vomiting    Phenytoin sodium extended Nausea And Vomiting        HPI   HPI    5/6/2018, 9:07 AM   History obtained from the daughter and patient      History of Present Illness: Gloria Sanz is a 86 y.o. female patient with a hx of asthma and anemia who presents to the Emergency Department for an evaluation of BLE swelling which onset gradually x2 days ago. Pt is reportedly not on any diuretics. Pt's daughter reports pt has had similar sx in the past. Symptoms are constant and moderate in severity. Exacerbated by nothing and relieved by nothing. Associated sxs include SOB with exertion. Patient denies any fever, chills, CP, N/V, recent travel, and all other sxs at this time. No further complaints or concerns at this time.         Arrival mode: Personal vehicle     PCP: Derrick Desouza MD       Past Medical History:  Past Medical History:   Diagnosis Date    Anemia     sickle cell trait, alpha thalessemia    Anemia of chronic renal failure 5/12/2014    Asthma, chronic     Cataract     GERD (gastroesophageal reflux disease)     Gout, arthritis     Helicobacter pylori gastritis     Hypertension     Osteoarthritis     Pulmonary HTN     Renal insufficiency        Past Surgical History:  Past Surgical History:   Procedure Laterality Date    CHOLECYSTECTOMY      endometrial polyp      FETAL BLOOD TRANSFUSION  12/11/2015    2 pints    THYROID  SURGERY           Family History:  Family History   Problem Relation Age of Onset    Liver cancer Mother     Cancer Mother     Liver cancer Sister     Breast cancer Sister     Cancer Sister     Glaucoma Sister     Thyroid disease Sister     Melanoma Neg Hx     Eczema Neg Hx     Lupus Neg Hx     Psoriasis Neg Hx        Social History:  Social History     Social History Main Topics    Smoking status: Never Smoker    Smokeless tobacco: Never Used    Alcohol use No    Drug use: No    Sexual activity: Unknown       ROS   Review of Systems   Constitutional: Negative for chills, diaphoresis and fever.        (-) Recent travel   HENT: Negative for sore throat and trouble swallowing.    Respiratory: Positive for shortness of breath (with exertion). Negative for cough and chest tightness.    Cardiovascular: Positive for leg swelling. Negative for chest pain.   Gastrointestinal: Negative for abdominal pain, nausea and vomiting.   Genitourinary: Negative for dysuria, frequency, hematuria and urgency.   Musculoskeletal: Negative for back pain and neck pain.   Skin: Negative for rash.   Neurological: Negative for weakness, light-headedness and headaches.   Hematological: Does not bruise/bleed easily.     Physical Exam      Initial Vitals [05/06/18 0904]   BP Pulse Resp Temp SpO2   117/69 64 20 98.4 °F (36.9 °C) 98 %      MAP       85          Physical Exam  Nursing Notes and Vital Signs Reviewed.  Constitutional: Patient is in no acute distress. Well-developed and well-nourished.  Head: Atraumatic. Normocephalic.  Eyes: PERRL. EOM intact. Conjunctivae are not pale. No scleral icterus.  ENT: Mucous membranes are moist. Oropharynx is clear and symmetric.    Neck: Supple. Full ROM. No lymphadenopathy.  Cardiovascular: Regular rate. Regular rhythm.  Pulmonary/Chest: No respiratory distress. Rales noted to bilateral bases.   Abdominal: Soft and non-distended.  There is no tenderness.   Musculoskeletal: Moves all  "extremities. No obvious deformities. +1 BLE pitting edema. No calf tenderness.  Skin: Warm and dry.  Neurological:  Alert, awake, and appropriate.  Normal speech.  No acute focal neurological deficits are appreciated.  Psychiatric: Normal affect. Good eye contact. Appropriate in content.    ED Course    Procedures  ED Vital Signs:  Vitals:    05/06/18 0904 05/06/18 0917 05/06/18 0945 05/06/18 1018   BP: 117/69 138/74 134/77 (!) 156/80   Pulse: 64 65 64 65   Resp: 20 20 20 20   Temp: 98.4 °F (36.9 °C)      TempSrc: Oral      SpO2: 98% 100% 100% 100%   Weight: 57.2 kg (126 lb) 68.4 kg (150 lb 14.4 oz)     Height: 5' 2" (1.575 m)          Abnormal Lab Results:  Labs Reviewed   CBC W/ AUTO DIFFERENTIAL - Abnormal; Notable for the following:        Result Value    RBC 3.15 (*)     Hemoglobin 7.6 (*)     Hematocrit 23.3 (*)     MCV 74 (*)     MCH 24.1 (*)     RDW 19.1 (*)     MPV 8.7 (*)     Mono% 17.5 (*)     All other components within normal limits   COMPREHENSIVE METABOLIC PANEL - Abnormal; Notable for the following:     CO2 16 (*)     BUN, Bld 54 (*)     Creatinine 4.8 (*)     Albumin 2.9 (*)     eGFR if  9 (*)     eGFR if non  8 (*)     All other components within normal limits   URINALYSIS - Abnormal; Notable for the following:     Protein, UA Trace (*)     Leukocytes, UA 3+ (*)     All other components within normal limits   B-TYPE NATRIURETIC PEPTIDE - Abnormal; Notable for the following:      (*)     All other components within normal limits   URINALYSIS MICROSCOPIC - Abnormal; Notable for the following:     WBC, UA 20 (*)     All other components within normal limits   ISTAT PROCEDURE - Abnormal; Notable for the following:     POC PCO2 27.6 (*)     POC HCO3 18.8 (*)     All other components within normal limits   CK   TROPONIN I        All Lab Results:  Results for orders placed or performed during the hospital encounter of 05/06/18   CBC auto differential   Result Value " Ref Range    WBC 5.19 3.90 - 12.70 K/uL    RBC 3.15 (L) 4.00 - 5.40 M/uL    Hemoglobin 7.6 (L) 12.0 - 16.0 g/dL    Hematocrit 23.3 (L) 37.0 - 48.5 %    MCV 74 (L) 82 - 98 fL    MCH 24.1 (L) 27.0 - 31.0 pg    MCHC 32.6 32.0 - 36.0 g/dL    RDW 19.1 (H) 11.5 - 14.5 %    Platelets 245 150 - 350 K/uL    MPV 8.7 (L) 9.2 - 12.9 fL    Gran # (ANC) 2.5 1.8 - 7.7 K/uL    Lymph # 1.6 1.0 - 4.8 K/uL    Mono # 0.9 0.3 - 1.0 K/uL    Eos # 0.2 0.0 - 0.5 K/uL    Baso # 0.01 0.00 - 0.20 K/uL    Gran% 47.4 38.0 - 73.0 %    Lymph% 31.6 18.0 - 48.0 %    Mono% 17.5 (H) 4.0 - 15.0 %    Eosinophil% 3.3 0.0 - 8.0 %    Basophil% 0.2 0.0 - 1.9 %    Differential Method Automated    Comprehensive metabolic panel   Result Value Ref Range    Sodium 139 136 - 145 mmol/L    Potassium 4.7 3.5 - 5.1 mmol/L    Chloride 108 95 - 110 mmol/L    CO2 16 (L) 23 - 29 mmol/L    Glucose 92 70 - 110 mg/dL    BUN, Bld 54 (H) 8 - 23 mg/dL    Creatinine 4.8 (H) 0.5 - 1.4 mg/dL    Calcium 8.8 8.7 - 10.5 mg/dL    Total Protein 7.9 6.0 - 8.4 g/dL    Albumin 2.9 (L) 3.5 - 5.2 g/dL    Total Bilirubin 0.3 0.1 - 1.0 mg/dL    Alkaline Phosphatase 78 55 - 135 U/L    AST 25 10 - 40 U/L    ALT 13 10 - 44 U/L    Anion Gap 15 8 - 16 mmol/L    eGFR if African American 9 (A) >60 mL/min/1.73 m^2    eGFR if non African American 8 (A) >60 mL/min/1.73 m^2   Urinalysis   Result Value Ref Range    Specimen UA Urine, Clean Catch     Color, UA Yellow Yellow, Straw, Fabiana    Appearance, UA Clear Clear    pH, UA 8.0 5.0 - 8.0    Specific Gravity, UA 1.015 1.005 - 1.030    Protein, UA Trace (A) Negative    Glucose, UA Negative Negative    Ketones, UA Negative Negative    Bilirubin (UA) Negative Negative    Occult Blood UA Negative Negative    Nitrite, UA Negative Negative    Urobilinogen, UA Negative <2.0 EU/dL    Leukocytes, UA 3+ (A) Negative   Brain natriuretic peptide   Result Value Ref Range     (H) 0 - 99 pg/mL   CK   Result Value Ref Range    CPK 76 20 - 180 U/L   Troponin I    Result Value Ref Range    Troponin I 0.011 0.000 - 0.026 ng/mL   Urinalysis Microscopic   Result Value Ref Range    WBC, UA 20 (H) 0 - 5 /hpf    Squam Epithel, UA 5 /hpf    Microscopic Comment SEE COMMENT    ISTAT PROCEDURE   Result Value Ref Range    POC PH 7.442 7.35 - 7.45    POC PCO2 27.6 (LL) 35 - 45 mmHg    POC PO2 86 80 - 100 mmHg    POC HCO3 18.8 (L) 24 - 28 mmol/L    POC BE -5 -2 to 2 mmol/L    POC SATURATED O2 97 95 - 100 %    Sample ARTERIAL     Site LR     Allens Test Pass     DelSys Room Air     Mode SPONT     FiO2 21          Imaging Results:  Imaging Results          X-Ray Chest AP Portable (Final result)  Result time 05/06/18 09:53:44    Final result by Haile Tejada MD (05/06/18 09:53:44)                 Impression:      No acute findings.      Electronically signed by: Haile Tejada MD  Date:    05/06/2018  Time:    09:53             Narrative:    EXAMINATION:  XR CHEST AP PORTABLE    CLINICAL HISTORY:  Acute shortness of breath;    TECHNIQUE:  AP view of the chest was performed.    COMPARISON:  None    FINDINGS:  The cardiac and mediastinal silhouettes appear within normal limits.   The lungs are clear bilaterally.  No acute osseous findings demonstrated.                                      The EKG was ordered, reviewed, and independently interpreted by the ED provider.  Interpretation time: 9:27  Rate: 58 BPM  Rhythm: sinus bradycardia with 1st degree AV block  Interpretation: Possible left atrial enlargement. Left ventricular hypertrophy. No STEMI.      The Emergency Provider reviewed the vital signs and test results, which are outlined above.    ED Discussion     10:33 AM: Dr. Hankins discussed the pt's case with Niurka Gibbs NP ([facility]) who recommends ordering an ABG and then calling her back.      11:00 AM: Discussed case with Niurka gibbs NP ([facility]) who agrees with current care and management of pt and accepts admission.   Admitting Service: Hospital medicine    Admitting Physician: Dr. Arellano  Admit to: Observation    11:03 AM: Re-evaluated pt. I have discussed test results, shared treatment plan, and the need for admission with patient and family at bedside. Pt and family express understanding at this time and agree with all information. All questions answered. Pt and family have no further questions or concerns at this time. Pt is ready for admit.      ED Medication(s):  Medications   furosemide injection 20 mg (not administered)       New Prescriptions    No medications on file             Medical Decision Making    Medical Decision Making:   Clinical Tests:   Lab Tests: Ordered and Reviewed  Radiological Study: Ordered and Reviewed  Medical Tests: Ordered and Reviewed           Scribe Attestation:   Scribe #1: I performed the above scribed service and the documentation accurately describes the services I performed. I attest to the accuracy of the note.    Attending:   Physician Attestation Statement for Scribe #1: I, Scott Hankins MD, personally performed the services described in this documentation, as scribed by Obinna Al, in my presence, and it is both accurate and complete.          Clinical Impression       ICD-10-CM ICD-9-CM   1. MENJIVAR (dyspnea on exertion) R06.09 786.09   2. Stage 4 chronic kidney disease N18.4 585.4   3. Leg swelling M79.89 729.81       Disposition:   Disposition: Placed in Observation  Condition: Stable         Scott Hankins MD  05/06/18 1106

## 2018-05-06 NOTE — HPI
Gloria Sanz is a 86 year old female with history of HTN, GERD, AOCD, CKD III, and left kidney mass who presents to ED with worsening lower extremity edema. Symptoms are intermittent and exacerbated by increased sodium intake. She denies associated dyspnea, PND, or orthopnea. Family also reports that patient drinks lots of water. Work up in ED revealed chronic anemia, worsening metabolic acidosis, and CKD. CXR  Unremarkable. Patient has received 20mg Lasix IV in ED. She has been admitted to Observation for further evaluation.

## 2018-05-06 NOTE — ASSESSMENT & PLAN NOTE
-patient has history of alpha thalassemia followed by Dr. Clark  -receives Procrit outpatient as well  -H/H 7/23 today.

## 2018-05-06 NOTE — HOSPITAL COURSE
Gloria Sanz is a 86 year old female with history of CKD IV who was admitted to [facility] for lower extremity edema and pain while ambulating. She was gently diuresed. She admits to noncompliance with sodium and fluid restriction and was counseled on this. She was continued on sodium bicarbonate for metabolic acidosis. Nephrology service assisted with care. She will continue Lasix 20mg daily as needed for leg swelling. Complains of feet pain with paraesthesias are concerning for underlying neuropathy. Tylenol is okay for pain, but patient was asked to avoid NSAIDS. Will not initiate gabapentin due to severity of renal failure. She will need to follow up with PCP for further work up regarding this. She did not have wounds or ulcers contributing to pain. Appt has been scheduled with Dr. Munguia for 5/11/2018. Patient seen and examined on date of discharge and deemed suitable.

## 2018-05-06 NOTE — ASSESSMENT & PLAN NOTE
Patient is seen in renal clinic at [facility] for CKD 4/5. Patient not a candidate for hemodialysis. Will follow her as outpatient.  Good hydration (60 ounces of fluids per day) and avoidance of NSAIDS was emphasized.

## 2018-05-06 NOTE — PLAN OF CARE
Problem: Patient Care Overview  Goal: Plan of Care Review  Outcome: Ongoing (interventions implemented as appropriate)  Pt has been free of falls. PIV intact. SR on the monitor. Pt is on RA. Pt has no c/o pain. Lasix given in ED. +2 edema jakob lower ext. Call light in reach and hourly rounding made.

## 2018-05-06 NOTE — NURSING
Discharged orders received and reviewed with family and pt. Pt instructed when to take each medication next dose. IV removed, telemetry removed. Pt assisted with dressing by staff. Pt transported to [facility] via w/c by staff for family to transport home.

## 2018-05-06 NOTE — SUBJECTIVE & OBJECTIVE
Past Medical History:   Diagnosis Date    Anemia     sickle cell trait, alpha thalessemia    Anemia of chronic renal failure 5/12/2014    Asthma, chronic     Cataract     GERD (gastroesophageal reflux disease)     Gout, arthritis     Helicobacter pylori gastritis     Hypertension     Osteoarthritis     Pulmonary HTN     Renal insufficiency        Past Surgical History:   Procedure Laterality Date    CHOLECYSTECTOMY      endometrial polyp      FETAL BLOOD TRANSFUSION  12/11/2015    2 pints    THYROID SURGERY         Review of patient's allergies indicates:   Allergen Reactions    Allopurinol analogues Rash and Other (See Comments)     Sores in mouth  Sores in mouth  Sores in mouth    Diflucan [fluconazole] Swelling    Amlodipine Edema and Swelling    Ace inhibitors Nausea And Vomiting    Metronidazole Nausea And Vomiting    Phenytoin sodium extended Nausea And Vomiting     Current Facility-Administered Medications   Medication Frequency    acetaminophen tablet 650 mg Q8H PRN    ondansetron injection 4 mg Q12H PRN    promethazine (PHENERGAN) 6.25 mg in dextrose 5 % 50 mL IVPB Q6H PRN     Family History     Problem Relation (Age of Onset)    Breast cancer Sister    Cancer Mother, Sister    Glaucoma Sister    Liver cancer Mother, Sister    Thyroid disease Sister        Social History Main Topics    Smoking status: Never Smoker    Smokeless tobacco: Never Used    Alcohol use No    Drug use: No    Sexual activity: Not on file     Review of Systems   Constitutional: Negative for fatigue and fever.   HENT: Negative for congestion.    Eyes: Negative for visual disturbance.   Respiratory: Negative for cough, shortness of breath and wheezing.    Cardiovascular: Positive for leg swelling. Negative for chest pain and palpitations.   Gastrointestinal: Negative for abdominal pain, diarrhea, nausea and vomiting.   Genitourinary: Negative for difficulty urinating and dysuria.   Musculoskeletal: Negative  for joint swelling.        She reports LE pain/burning.    Skin: Negative for rash.   Neurological: Negative for weakness and headaches.     Objective:     Vital Signs (Most Recent):  Temp: 97.5 °F (36.4 °C) (05/06/18 1414)  Pulse: 67 (05/06/18 1509)  Resp: 17 (05/06/18 1414)  BP: (!) 147/72 (05/06/18 1414)  SpO2: 97 % (05/06/18 1414)  O2 Device (Oxygen Therapy): room air (05/06/18 1414) Vital Signs (24h Range):  Temp:  [97.5 °F (36.4 °C)-98.4 °F (36.9 °C)] 97.5 °F (36.4 °C)  Pulse:  [60-96] 67  Resp:  [17-21] 17  SpO2:  [97 %-100 %] 97 %  BP: (117-163)/(69-81) 147/72     Weight: 69.5 kg (153 lb 3.2 oz) (05/06/18 1435)  Body mass index is 28.02 kg/m².  Body surface area is 1.74 meters squared.    No intake/output data recorded.    Physical Exam   Constitutional: She appears well-developed and well-nourished.   HENT:   Head: Normocephalic.   Eyes: Pupils are equal, round, and reactive to light.   Neck: No thyromegaly present.   Cardiovascular: Normal rate and regular rhythm.  Exam reveals no friction rub.    Pulmonary/Chest: Effort normal and breath sounds normal. She has no wheezes. She exhibits no tenderness.   Abdominal: Soft. Bowel sounds are normal. She exhibits no distension. There is no tenderness.   Musculoskeletal: She exhibits edema.   Mild bilateral ankle edema.    Lymphadenopathy:     She has no cervical adenopathy.   Neurological: She is alert.   Skin: Skin is warm and dry. No rash noted.   Psychiatric: She has a normal mood and affect.       Significant Labs:  Lab Results   Component Value Date    CREATININE 4.8 (H) 05/06/2018    BUN 54 (H) 05/06/2018     05/06/2018    K 4.7 05/06/2018     05/06/2018    CO2 16 (L) 05/06/2018     Lab Results   Component Value Date    .0 (H) 05/02/2016    CALCIUM 8.8 05/06/2018    PHOS 4.2 12/20/2016       Lab Results   Component Value Date    ALBUMIN 2.9 (L) 05/06/2018     Lab Results   Component Value Date    WBC 5.19 05/06/2018    HGB 7.6 (L)  05/06/2018    HCT 23.3 (L) 05/06/2018    MCV 74 (L) 05/06/2018     05/06/2018         Significant Imaging:  Imaging Results          X-Ray Chest AP Portable (Final result)  Result time 05/06/18 09:53:44    Final result by Haile Tejada MD (05/06/18 09:53:44)                 Impression:      No acute findings.      Electronically signed by: Haile Tejada MD  Date:    05/06/2018  Time:    09:53             Narrative:    EXAMINATION:  XR CHEST AP PORTABLE    CLINICAL HISTORY:  Acute shortness of breath;    TECHNIQUE:  AP view of the chest was performed.    COMPARISON:  None    FINDINGS:  The cardiac and mediastinal silhouettes appear within normal limits.   The lungs are clear bilaterally.  No acute osseous findings demonstrated.

## 2018-05-07 LAB
ESTIMATED PA SYSTOLIC PRESSURE: 27.21
MITRAL VALVE REGURGITATION: NORMAL
RETIRED EF AND QEF - SEE NOTES: 60 (ref 55–65)
TRICUSPID VALVE REGURGITATION: NORMAL

## 2018-05-08 ENCOUNTER — OFFICE VISIT (OUTPATIENT)
Dept: INTERNAL MEDICINE | Facility: CLINIC | Age: 83
End: 2018-05-08
Payer: COMMERCIAL

## 2018-05-08 VITALS
HEIGHT: 62 IN | SYSTOLIC BLOOD PRESSURE: 122 MMHG | OXYGEN SATURATION: 99 % | WEIGHT: 143.75 LBS | BODY MASS INDEX: 26.45 KG/M2 | DIASTOLIC BLOOD PRESSURE: 74 MMHG | HEART RATE: 97 BPM | TEMPERATURE: 97 F

## 2018-05-08 DIAGNOSIS — R60.0 LOWER EXTREMITY EDEMA: ICD-10-CM

## 2018-05-08 DIAGNOSIS — I10 ESSENTIAL HYPERTENSION: ICD-10-CM

## 2018-05-08 DIAGNOSIS — N18.4 CKD (CHRONIC KIDNEY DISEASE) STAGE 4, GFR 15-29 ML/MIN: Primary | ICD-10-CM

## 2018-05-08 PROCEDURE — 99213 OFFICE O/P EST LOW 20 MIN: CPT | Mod: S$GLB,,, | Performed by: FAMILY MEDICINE

## 2018-05-08 PROCEDURE — 99999 PR PBB SHADOW E&M-EST. PATIENT-LVL III: CPT | Mod: PBBFAC,,, | Performed by: FAMILY MEDICINE

## 2018-05-08 NOTE — PROGRESS NOTES
Subjective:       Patient ID: Gloria Sanz is a 86 y.o. female.    Chief Complaint: Follow-up    F/U    Pt is a 86 year old who is here post hospital follow-up. Pt had SOB and was volume over load and had to be given lasix. Pt is now feeling good.      Review of Systems   Constitutional: Negative.    Respiratory: Negative.    Gastrointestinal: Negative.    Genitourinary: Negative.    Musculoskeletal: Negative.    Neurological: Negative.    Hematological: Negative.        Objective:      Physical Exam   Constitutional: She is oriented to person, place, and time. She appears well-developed and well-nourished.   Cardiovascular: Normal rate and regular rhythm.    Pulmonary/Chest: Effort normal and breath sounds normal.   Neurological: She is alert and oriented to person, place, and time.   Skin: Skin is warm and dry.       Assessment:       1. CKD (chronic kidney disease) stage 4, GFR 15-29 ml/min    2. Essential hypertension    3. Lower extremity edema        Plan:       CKD (chronic kidney disease) stage 4, GFR 15-29 ml/min  Comments:  Pt continues to see Kidney    Essential hypertension  Comments:  Pt BP is well controlled    Lower extremity edema  Comments:  Normal today.

## 2018-05-11 ENCOUNTER — OFFICE VISIT (OUTPATIENT)
Dept: NEPHROLOGY | Facility: CLINIC | Age: 83
End: 2018-05-11
Payer: COMMERCIAL

## 2018-05-11 ENCOUNTER — INFUSION (OUTPATIENT)
Dept: INFUSION THERAPY | Facility: HOSPITAL | Age: 83
End: 2018-05-11
Attending: INTERNAL MEDICINE
Payer: COMMERCIAL

## 2018-05-11 ENCOUNTER — OFFICE VISIT (OUTPATIENT)
Dept: HEMATOLOGY/ONCOLOGY | Facility: CLINIC | Age: 83
End: 2018-05-11
Payer: COMMERCIAL

## 2018-05-11 VITALS
OXYGEN SATURATION: 100 % | SYSTOLIC BLOOD PRESSURE: 162 MMHG | HEIGHT: 62 IN | HEART RATE: 89 BPM | WEIGHT: 143.75 LBS | BODY MASS INDEX: 26.45 KG/M2 | DIASTOLIC BLOOD PRESSURE: 89 MMHG | TEMPERATURE: 98 F | RESPIRATION RATE: 16 BRPM

## 2018-05-11 VITALS
SYSTOLIC BLOOD PRESSURE: 142 MMHG | WEIGHT: 145.5 LBS | BODY MASS INDEX: 26.78 KG/M2 | HEIGHT: 62 IN | HEART RATE: 84 BPM | DIASTOLIC BLOOD PRESSURE: 80 MMHG

## 2018-05-11 DIAGNOSIS — N18.5 CKD (CHRONIC KIDNEY DISEASE) STAGE 5, GFR LESS THAN 15 ML/MIN: Primary | ICD-10-CM

## 2018-05-11 DIAGNOSIS — N18.4 ANEMIA OF CHRONIC RENAL FAILURE, STAGE 4 (SEVERE): Primary | ICD-10-CM

## 2018-05-11 DIAGNOSIS — N18.30 ANEMIA OF CHRONIC RENAL FAILURE, STAGE 3 (MODERATE): Primary | ICD-10-CM

## 2018-05-11 DIAGNOSIS — E87.20 ACIDOSIS, METABOLIC: ICD-10-CM

## 2018-05-11 DIAGNOSIS — D63.1 ANEMIA OF CHRONIC RENAL FAILURE, STAGE 4 (SEVERE): Primary | ICD-10-CM

## 2018-05-11 DIAGNOSIS — R80.9 PROTEINURIA, UNSPECIFIED TYPE: ICD-10-CM

## 2018-05-11 DIAGNOSIS — D63.1 ANEMIA OF CHRONIC RENAL FAILURE, STAGE 3 (MODERATE): Primary | ICD-10-CM

## 2018-05-11 PROCEDURE — 99214 OFFICE O/P EST MOD 30 MIN: CPT | Mod: S$GLB,,, | Performed by: INTERNAL MEDICINE

## 2018-05-11 PROCEDURE — 99999 PR PBB SHADOW E&M-EST. PATIENT-LVL III: CPT | Mod: PBBFAC,,, | Performed by: INTERNAL MEDICINE

## 2018-05-11 PROCEDURE — 99214 OFFICE O/P EST MOD 30 MIN: CPT | Mod: 25,S$GLB,, | Performed by: INTERNAL MEDICINE

## 2018-05-11 PROCEDURE — 63600175 PHARM REV CODE 636 W HCPCS: Mod: JG,PO | Performed by: INTERNAL MEDICINE

## 2018-05-11 PROCEDURE — 96372 THER/PROPH/DIAG INJ SC/IM: CPT | Mod: PO

## 2018-05-11 RX ORDER — SODIUM BICARBONATE 650 MG/1
650 TABLET ORAL 3 TIMES DAILY
Qty: 90 TABLET | Refills: 11 | Status: SHIPPED | OUTPATIENT
Start: 2018-05-11 | End: 2019-05-11

## 2018-05-11 RX ORDER — CLONIDINE HYDROCHLORIDE 0.3 MG/1
TABLET ORAL
Qty: 180 TABLET | Refills: 3 | Status: SHIPPED | OUTPATIENT
Start: 2018-05-11

## 2018-05-11 RX ADMIN — ERYTHROPOIETIN 40000 UNITS: 40000 INJECTION, SOLUTION INTRAVENOUS; SUBCUTANEOUS at 10:05

## 2018-05-11 NOTE — PROGRESS NOTES
Subjective:       Patient ID: Gloria Sanz is a 86 y.o. female.    Chief Complaint: No chief complaint on file.    HPI   This 84 year old AA lady comes for follow uo her anemia associated to chronic renal failure.  She is known to us because in MAy 2010 had a cbc that showed a hemoglobin of 10.6 with an MCV of 79.4. White cell count was 4,380 (differential included 17% monocytes) and a platelet count of 217,000 Review of information in the computer indicated that the patient has had mild anemia dating back a number of years\.    In 1991, her hemoglobin was 11.7 with an MCV of 84. Her MCV through the years has been anywhere from 79-84. The patient has had several iron studies that showed an elevated ferritin, a creatinine of 1.7 as well as serum protein electrophoresis that has failed to show a monoclonal spike.    . B12 levels have been normal.    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    The patient comes today for routine follow up.    At the end dec 2011 she had a severe epistaxis requiring a blood transfusion. She ended up having surgery for nasal polyps In May 2012 she ha had a drop of hr HGB to 8.8 and her creatinine was 1.8 She slowly improved ion her own. SPEp showed a polyclonal gammopathy and her free light chains showed elevations of both the kappa and lambda light chains which goes against clonality.       She had the zoster vaccine in 2008    She is on procrit every 2 weeks when needed    She is know to have a sub-centimeter pulmonary nodule in the right middle lobe, Ct scan in December 2015 showed stability.A CT scan done 3/1027 showed a new nodule, measuring 4.6 mm in the right lung.  She had a Ct scan in early April 2018 which showed stability of thee lung nodules but a new  Probable 3.1 cm lesion in the  lefy kidney. She had an US in 4/2018  that  showed multiple cysts but no solid masses.  She has recurrent epistaxis which is felt to be due to HBP     She comes for follow up to see if she will need procrit.     ALLERGIES: See med card  MEDICATIONS: See MedCard.  PREVIOUS SURGERIES: Thyroid nodule removed many years ago and endometrial  polyp removed in 2009.  Nasal polyps surgery around april 2012  SOCIAL HISTORY: She is  with three children. She lives in [place].  She does not smoke or drink. 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  FAMILY HISTORY: Two sisters have liver cancer, and another one has breast  cancer. Mother had cancer of the gallbladder. Father had a heart attack.  No diabetes in the family.  PAST MEDICAL HISTORY:  1. 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  2. Alpha globin gene rearrangement test showed that she is an alpha  thalassemia carrier.  3. Chronic anemia.  4-elevated creatinine  5. Hypertension.  6. Status post removal of endometrial polyp.  7. Epistaxis-recurrent felt to be due to HBP.  8. hx of epistaxis Dec 2011 ( nasal polyps  8.renal cysts        Review of Systems   Constitutional: Negative.  Negative for appetite change and unexpected weight change.   HENT: Negative.    Eyes: Negative.  Negative for visual disturbance.   Respiratory: Negative.  Negative for cough, shortness of breath and wheezing.    Cardiovascular: Negative.  Negative for chest pain.   Gastrointestinal: Negative.  Negative for abdominal pain and diarrhea.   Genitourinary: Negative.  Negative for frequency.   Musculoskeletal: Negative for back pain.   Skin: Negative for rash.   Neurological: Negative.  Negative for headaches.   Hematological: Negative for adenopathy.   Psychiatric/Behavioral: Negative.  The patient is not nervous/anxious.        Objective:      Physical Exam   Constitutional: She is oriented to person, place, and time. She appears well-developed. No distress.   HENT:   Head:  Normocephalic.   Right Ear: Tympanic membrane, external ear and ear canal normal.   Left Ear: Tympanic membrane, external ear and ear canal normal.   Nose: Nose normal. Right sinus exhibits no maxillary sinus tenderness and no frontal sinus tenderness. Left sinus exhibits no maxillary sinus tenderness and no frontal sinus tenderness.   Mouth/Throat: Oropharynx is clear and moist and mucous membranes are normal.   Teeth normal.  Gums normal.   Eyes: Conjunctivae and lids are normal. Pupils are equal, round, and reactive to light.   Neck: Normal carotid pulses, no hepatojugular reflux and no JVD present. Carotid bruit is not present. No tracheal deviation present. No thyroid mass and no thyromegaly present.   Cardiovascular: Normal rate, regular rhythm, S1 normal, S2 normal, normal heart sounds and intact distal pulses.  Exam reveals no gallop and no friction rub.    No murmur heard.  Carotid exam normal   Pulmonary/Chest: Effort normal and breath sounds normal. No accessory muscle usage. No respiratory distress. She has no wheezes. She has no rales. She exhibits no tenderness.   Abdominal: Soft. Normal appearance. She exhibits no distension and no mass. There is no splenomegaly or hepatomegaly. There is no tenderness. There is no rebound and no guarding.   Musculoskeletal: Normal range of motion. She exhibits no edema or tenderness.        Right hand: Normal.        Left hand: Normal.       Lymphadenopathy:     She has no cervical adenopathy.     She has no axillary adenopathy.        Right: No inguinal and no supraclavicular adenopathy present.        Left: No inguinal and no supraclavicular adenopathy present.   Neurological: She is alert and oriented to person, place, and time. She has normal strength. No cranial nerve deficit. Coordination normal.   Skin: Skin is warm and dry. No rash noted. She is not diaphoretic. No cyanosis or erythema. No pallor. Nails show no clubbing.   Psychiatric: She has a normal mood  and affect. Her behavior is normal. Judgment and thought content normal.       Wt Readings from Last 3 Encounters:   05/11/18 65.2 kg (143 lb 11.8 oz)   05/11/18 66 kg (145 lb 8.1 oz)   05/08/18 65.2 kg (143 lb 11.8 oz)     Temp Readings from Last 3 Encounters:   05/11/18 97.7 °F (36.5 °C)   05/08/18 96.9 °F (36.1 °C) (Tympanic)   05/06/18 97.5 °F (36.4 °C) (Oral)     BP Readings from Last 3 Encounters:   05/11/18 (!) 162/89   05/11/18 (!) 142/80   05/08/18 122/74     Pulse Readings from Last 3 Encounters:   05/11/18 89   05/11/18 84   05/08/18 97       Assessment:       1. Anemia of chronic renal failure, stage 4 (severe)        Plan:       Lab Results   Component Value Date    WBC 6.63 05/11/2018    HGB 8.7 (L) 05/11/2018    HCT 27.2 (L) 05/11/2018    MCV 74 (L) 05/11/2018     05/11/2018     Lab Results   Component Value Date    CREATININE 4.8 (H) 05/06/2018     Lab Results   Component Value Date    ALT 13 05/06/2018    AST 25 05/06/2018    ALKPHOS 78 05/06/2018    BILITOT 0.3 05/06/2018     Lab Results   Component Value Date    IRON 77 03/15/2018    TIBC 216 (L) 03/15/2018    FERRITIN 175 03/15/2018       Hemoglobin is up from 7 6  We will continue   Procrit. See me and nurses in 4 weeks with a cbc

## 2018-05-11 NOTE — PATIENT INSTRUCTIONS
[facility]  [address]  [address] [address]  [phone] phone     [phone] fax  Hours of Operation: Monday- Friday 8:00am- 5:00pm  After hours phone  [phone]  Hematology / Oncology Physicians on call      Dr. Fernando Napoles                        Please call with any concerns regarding your appointment today.  FALL PREVENTION   Falls often occur due to slipping, tripping or losing your balance. Here are ways to reduce your risk of falling again.   Was there anything that caused your fall that can be fixed, removed or replaced?   Make your home safe by keeping walkways clear of objects you may trip over.   Use non-slip pads under rugs.   Do not walk in poorly lit areas.   Do not stand on chairs or wobbly ladders.   Use caution when reaching overhead or looking upward. This position can cause a loss of balance.   Be sure your shoes fit properly, have non-slip bottoms and are in good condition.   Be cautious when going up and down stairs, curbs, and when walking on uneven sidewalks.   If your balance is poor, consider using a cane or walker.   If your fall was related to alcohol use, stop or limit alcohol intake.   If your fall was related to use of sleeping medicines, talk to your doctor about this. You may need to reduce your dosage at bedtime if you awaken during the night to go to the bathroom.   To reduce the need for nighttime bathroom trips:   Avoid drinking fluids for several hours before going to bed   Empty your bladder before going to bed   Men can keep a urinal at the bedside   © 3464-7326 [employer], [address] [place] [address], [place] [address]. All rights reserved. This information is not intended as a substitute for professional medical care. Always follow your healthcare professional's instructions.

## 2018-05-11 NOTE — PROGRESS NOTES
PROGRESS NOTE FOR ESTABLISHED PATIENT    PHYSICIAN REQUESTING THE CONSULT: Dr. Macedo    REASON FOR VISIT: Renal insufficiency    86 y.o. female with history of HTN, gout, osteoarthritis, anemia, asthma, pulmon. HTN, CKD presents to the renal clinic for evaluation of renal insufficiency.     Patient [place] [facility] [place] when she presented with LE edema. She was discharged on Lasix 20 mg po daily and LE edema has now largely resolved.          Past Medical History   Diagnosis Date    Hypertension     Gout, arthritis     Osteoarthritis     Anemia      sickle cell trait, alpha thalessemia    Helicobacter pylori gastritis     GERD (gastroesophageal reflux disease)     Asthma, chronic     Pulmonary HTN     Renal insufficiency     Cataract        Past Surgical History   Procedure Laterality Date    Thyroid surgery      Endometrial polyp         Allergies   Allergen Reactions    Ace Inhibitors Nausea And Vomiting    Amlodipine Edema    Metronidazole Nausea And Vomiting    Phenytoin Sodium Extended Nausea And Vomiting       Current Outpatient Prescriptions   Medication Sig Dispense Refill    alendronate (FOSAMAX) 70 MG tablet Take 1 tablet once weekly in the morning with a full glass of water on an empty stomach. Do not consume food or lie down for at least 30 minutes afterwards.  4 tablet  6    allopurinol (ZYLOPRIM) 100 MG tablet Take 1 tablet (100 mg total) by mouth once daily. 1 Tablet Oral Every day  30 tablet  6    B complex vitamins (B COMPLEX) TbSR Take by mouth. 1 Tablet Extended Release Oral Every day        CALCIUM CARBONATE/VITAMIN D3 (CALCIUM 500 + D, D3, ORAL) Take by mouth. 1 Tablet Oral Twice a day        cyclobenzaprine (FLEXERIL) 5 MG tablet Take 1 tablet (5 mg total) by mouth daily as needed for Muscle spasms. May cause sedation - use with caution  30 tablet  0    desonide (DESOWEN) 0.05 % cream     0    losartan (COZAAR) 100 MG tablet Take 1 tablet (100  mg total) by mouth once daily. 1 Tablet Oral daiy  30 tablet  6    mometasone (ASMANEX TWISTHALER) 220 mcg (60 doses) AePB Inhale into the lungs. 2 Aerosol Powdr Breath Activated Inhalation Every evening.  Always rinse mouth or brush teeth afteruse.Use every day as directed.        nebivolol (BYSTOLIC) 5 MG Tab Take 1 tablet (5 mg total) by mouth once daily.  30 tablet  3    nystatin-triamcinolone (MYCOLOG) ointment Apply topically 2 (two) times daily as needed.  15 g  0    omeprazole (PRILOSEC) 20 MG capsule Take 1 capsule (20 mg total) by mouth once daily.  30 capsule  6     No current facility-administered medications for this visit.       Family History   Problem Relation Age of Onset    Liver cancer Mother     Cancer Mother     Liver cancer Sister     Breast cancer Sister     Cancer Sister     Glaucoma Sister     Thyroid disease Sister        History     Social History    Marital Status:      Spouse Name: N/A     Number of Children: N/A    Years of Education: N/A     Occupational History    Not on file.     Social History Main Topics    Smoking status: Never Smoker     Smokeless tobacco: Never Used    Alcohol Use: No    Drug Use: No    Sexually Active: Not on file     Other Topics Concern    Not on file     Social History Narrative       Review of Systems:  1. GENERAL: patient denies any fever, weight changes, generalized weakness, dizziness.  2. HEENT: patient denies headaches, visual disturbances, swallowing problems, sinus pain, nasal congestion  3. CARDIOVASCULAR: patient denies chest pain, palpitations.  4. PULMONARY: patient denies SOB, coughing, hemoptysis, wheezing.  5. GASTROINTESTINAL: patient denies abdominal pain, nausea, vomiting, diarrhea.  6. GENITOURINARY: patient denies dysuria, hematuria, hesitancy, frequency.  7. EXTREMITIES: patient denies LE edema, LE cramping.  8. DERMATOLOGY: patient denies rashes  9. NEURO: patient denies tremors, extremity weakness, extremity  "numbness/tingling.  10. MUSCULOSKELETAL: patient denies knee pain, no joint swelling.  11. HEMATOLOGY: patient denies rectal or gum bleeding.  12: PSYCH: patient denies anxiety, depression.      PHYSICAL EXAM:  BP (!) 142/80   Pulse 84   Ht 5' 2" (1.575 m)   Wt 66 kg (145 lb 8.1 oz)   BMI 26.61 kg/m²     GENERAL: Elderly lady presents to clinic with non-labored breathing.  HEENT: PERRL, no nasal discharge, no icterus, no oral exudates, moist mucosal membranes.  NECK: no thyroid mass, no lymphadenopathy.  HEART: RRR S1/S2, no rubs, good peripheral pulses.  LUNGS: CTA bilaterally, no wheezing, breathing is nonlabored.  ABDOMEN: soft, nontender, not distended, bowel sounds are present, no abdominal hernia.  EXTREM: Trace LE edema.  SKIN: no visible rashes  NEURO: A & O x 3, no obvious focal signs.    LABORATORY RESULTS:  Lab Results   Component Value Date    CREATININE 4.8 (H) 05/06/2018    BUN 54 (H) 05/06/2018     05/06/2018    K 4.7 05/06/2018     05/06/2018    CO2 16 (L) 05/06/2018     Lab Results   Component Value Date    .0 (H) 05/02/2016    CALCIUM 8.8 05/06/2018    PHOS 4.2 12/20/2016       Lab Results   Component Value Date    ALBUMIN 2.9 (L) 05/06/2018     Lab Results   Component Value Date    WBC 5.19 05/06/2018    HGB 7.6 (L) 05/06/2018    HCT 23.3 (L) 05/06/2018    MCV 74 (L) 05/06/2018     05/06/2018       Protein Creatinine Ratios:    Creatinine, Random Ur   Date Value Ref Range Status   12/20/2016 52.0 15.0 - 325.0 mg/dL Final     Comment:     The random urine reference ranges provided were established   for 24 hour urine collections.  No reference ranges exist for  random urine specimens.  Correlate clinically.     06/24/2016 71.0 15.0 - 325.0 mg/dL Final     Comment:     The random urine reference ranges provided were established   for 24 hour urine collections.  No reference ranges exist for  random urine specimens.  Correlate clinically.     05/02/2016 85.0 15.0 - 325.0 " mg/dL Final     Comment:     The random urine reference ranges provided were established   for 24 hour urine collections.  No reference ranges exist for  random urine specimens.  Correlate clinically.       Protein, Urine   Date Value Ref Range Status   06/22/2010 38 (H) 0 - 10 mg/dl Final   06/01/2010 47 (H) 0 - 10 mg/dl Final     Protein, Urine Random   Date Value Ref Range Status   12/20/2016 86 (H) 0 - 15 mg/dL Final     Comment:     The random urine reference ranges provided were established   for 24 hour urine collections.  No reference ranges exist for  random urine specimens.  Correlate clinically.     06/24/2016 136 (H) 0 - 15 mg/dL Final     Comment:     The random urine reference ranges provided were established   for 24 hour urine collections.  No reference ranges exist for  random urine specimens.  Correlate clinically.     05/02/2016 319 (H) 0 - 15 mg/dL Final     Comment:     The random urine reference ranges provided were established   for 24 hour urine collections.  No reference ranges exist for  random urine specimens.  Correlate clinically.       Prot/Creat Ratio, Ur   Date Value Ref Range Status   12/20/2016 1.65 (H) 0.00 - 0.20 Final   06/24/2016 1.92 (H) 0.00 - 0.20 Final   05/02/2016 3.75 (H) 0.00 - 0.20 Final       Renal ultrasound (6/10/14): right kidney size 9 cm, left kidney size 10.8 cm, bilateral septated cysts.       ASSESSMENT AND PLAN:  86 y.o. female with history of HTN, gout, osteoarthritis, anemia, pulmon. HTN, CKD presents to the renal clinic for evaluation of renal insufficiency.    1. Renal insufficiency: Patient's renal function has worsened further with creatinine at 4.8. This is likely related to poor hydration and patient was advised to hydrate with 60-65 ounces of water per day. Losartan has been discontinued because of hyperkalemia. Protein creatinine ratio was 1.92. Patient is now on diltiazem. Serologies were negative. Possibility of renal biopsy has been discussed.  Patient's renal function will be monitored closely and she will return to the clinic in 3 weeks for follow up. Patient was advised to avoid NSAID pain medications such as advil, aleve, motrin, ibuprofen, naprosyn, meloxicam, diclofenac, mobic.     2. Electrolytes: at goal.    3. Acid base status: mild acidosis, resume sodium bicarbonate.     4. Volume: Euvolemic. Patient was advised to hydrate with 60-65 ounces of water per day.     5. Hypertension: acceptable BP control.     6. Medications: Reviewed.     7. Anemia: patient is being followed by Dr. Clark.     8. Septated cysts: Urology is following.     9. Pulmonary nodules: as per Pulmonology.

## 2018-05-24 ENCOUNTER — HOSPITAL ENCOUNTER (EMERGENCY)
Facility: HOSPITAL | Age: 83
Discharge: HOME | End: 2018-05-24
Attending: EMERGENCY MEDICINE
Payer: COMMERCIAL

## 2018-05-24 VITALS
OXYGEN SATURATION: 99 % | DIASTOLIC BLOOD PRESSURE: 68 MMHG | HEART RATE: 89 BPM | TEMPERATURE: 99 F | RESPIRATION RATE: 17 BRPM | SYSTOLIC BLOOD PRESSURE: 120 MMHG

## 2018-05-24 DIAGNOSIS — R51.9 HEADACHE: ICD-10-CM

## 2018-05-24 DIAGNOSIS — N18.9 CHRONIC RENAL FAILURE, UNSPECIFIED CKD STAGE: Primary | ICD-10-CM

## 2018-05-24 LAB
ALBUMIN SERPL BCP-MCNC: 3.1 G/DL
ALP SERPL-CCNC: 73 U/L
ALT SERPL W/O P-5'-P-CCNC: 15 U/L
ANION GAP SERPL CALC-SCNC: 12 MMOL/L
AST SERPL-CCNC: 25 U/L
BACTERIA #/AREA URNS HPF: NORMAL /HPF
BASOPHILS # BLD AUTO: 0.05 K/UL
BASOPHILS NFR BLD: 0.6 %
BILIRUB SERPL-MCNC: 0.4 MG/DL
BILIRUB UR QL STRIP: NEGATIVE
BUN SERPL-MCNC: 58 MG/DL
CALCIUM SERPL-MCNC: 9.2 MG/DL
CHLORIDE SERPL-SCNC: 108 MMOL/L
CLARITY UR: CLEAR
CO2 SERPL-SCNC: 18 MMOL/L
COLOR UR: YELLOW
CREAT SERPL-MCNC: 5.1 MG/DL
DIFFERENTIAL METHOD: ABNORMAL
EOSINOPHIL # BLD AUTO: 0.1 K/UL
EOSINOPHIL NFR BLD: 1.8 %
ERYTHROCYTE [DISTWIDTH] IN BLOOD BY AUTOMATED COUNT: 19.8 %
EST. GFR  (AFRICAN AMERICAN): 8 ML/MIN/1.73 M^2
EST. GFR  (NON AFRICAN AMERICAN): 7 ML/MIN/1.73 M^2
GLUCOSE SERPL-MCNC: 88 MG/DL
GLUCOSE UR QL STRIP: NEGATIVE
HCT VFR BLD AUTO: 26.7 %
HGB BLD-MCNC: 8.5 G/DL
HGB UR QL STRIP: NEGATIVE
HYALINE CASTS #/AREA URNS LPF: 0 /LPF
KETONES UR QL STRIP: NEGATIVE
LACTATE SERPL-SCNC: 1.9 MMOL/L
LEUKOCYTE ESTERASE UR QL STRIP: NEGATIVE
LYMPHOCYTES # BLD AUTO: 2.5 K/UL
LYMPHOCYTES NFR BLD: 31.8 %
MCH RBC QN AUTO: 23.2 PG
MCHC RBC AUTO-ENTMCNC: 31.8 G/DL
MCV RBC AUTO: 73 FL
MICROSCOPIC COMMENT: NORMAL
MONOCYTES # BLD AUTO: 1.3 K/UL
MONOCYTES NFR BLD: 16.3 %
NEUTROPHILS # BLD AUTO: 3.8 K/UL
NEUTROPHILS NFR BLD: 49.9 %
NITRITE UR QL STRIP: NEGATIVE
PH UR STRIP: 7 [PH] (ref 5–8)
PLATELET # BLD AUTO: 335 K/UL
PMV BLD AUTO: 9.4 FL
POTASSIUM SERPL-SCNC: 4.8 MMOL/L
PROT SERPL-MCNC: 8.7 G/DL
PROT UR QL STRIP: ABNORMAL
RBC # BLD AUTO: 3.66 M/UL
RBC #/AREA URNS HPF: 2 /HPF (ref 0–4)
SODIUM SERPL-SCNC: 138 MMOL/L
SP GR UR STRIP: <=1.005 (ref 1–1.03)
SQUAMOUS #/AREA URNS HPF: 2 /HPF
URN SPEC COLLECT METH UR: ABNORMAL
UROBILINOGEN UR STRIP-ACNC: NEGATIVE EU/DL
WBC # BLD AUTO: 7.77 K/UL
WBC #/AREA URNS HPF: 1 /HPF (ref 0–5)

## 2018-05-24 PROCEDURE — 83605 ASSAY OF LACTIC ACID: CPT

## 2018-05-24 PROCEDURE — 87086 URINE CULTURE/COLONY COUNT: CPT

## 2018-05-24 PROCEDURE — 87040 BLOOD CULTURE FOR BACTERIA: CPT

## 2018-05-24 PROCEDURE — 93005 ELECTROCARDIOGRAM TRACING: CPT

## 2018-05-24 PROCEDURE — 81000 URINALYSIS NONAUTO W/SCOPE: CPT

## 2018-05-24 PROCEDURE — 85025 COMPLETE CBC W/AUTO DIFF WBC: CPT

## 2018-05-24 PROCEDURE — 25000003 PHARM REV CODE 250: Performed by: EMERGENCY MEDICINE

## 2018-05-24 PROCEDURE — 93010 ELECTROCARDIOGRAM REPORT: CPT | Mod: ,,, | Performed by: INTERNAL MEDICINE

## 2018-05-24 PROCEDURE — 99285 EMERGENCY DEPT VISIT HI MDM: CPT | Mod: 25

## 2018-05-24 PROCEDURE — 80053 COMPREHEN METABOLIC PANEL: CPT

## 2018-05-24 RX ORDER — ACETAMINOPHEN 500 MG
1000 TABLET ORAL
Status: COMPLETED | OUTPATIENT
Start: 2018-05-24 | End: 2018-05-24

## 2018-05-24 RX ADMIN — ACETAMINOPHEN 1000 MG: 500 TABLET, FILM COATED ORAL at 08:05

## 2018-05-24 NOTE — ED PROVIDER NOTES
SCRIBE #1 NOTE: I, Corinne Mack, am scribing for, and in the presence of, Saul Olivas MD. I have scribed the entire note.      History      Chief Complaint   Patient presents with    Headache     headache since yesterday       Review of patient's allergies indicates:   Allergen Reactions    Allopurinol analogues Rash and Other (See Comments)     Sores in mouth  Sores in mouth  Sores in mouth    Diflucan [fluconazole] Swelling    Amlodipine Edema and Swelling    Ace inhibitors Nausea And Vomiting    Metronidazole Nausea And Vomiting    Phenytoin sodium extended Nausea And Vomiting        HPI   HPI    5/24/2018, 7:33 AM   History obtained from the patient and daughter      History of Present Illness: Gloria Sanz is a 86 y.o. female patient with PMHx of GERD, HTN, and CKD who presents to the Emergency Department for frontal HA which onset suddenly yesterday. Per the daughter, pt's HA yesterday was tolerable yesterday, but became worse today. Pt's NH called the daughter this morning stating that the pt was in severe pain. Symptoms are constant and moderate in severity. Pt's pain is worse with raising her head. No mitigating factors reported. No other associated sxs reported. Patient denies any fever,chills, CP, SOB, N/V, back pain, neck pain, neck stiffness, numbness, weakness, and all other sxs at this time. No prior Tx reported. No further complaints or concerns at this time.         Arrival mode: EMS    PCP: Derrick Desouza MD       Past Medical History:  Past Medical History:   Diagnosis Date    Anemia     sickle cell trait, alpha thalessemia    Anemia of chronic renal failure 5/12/2014    Asthma, chronic     Cataract     GERD (gastroesophageal reflux disease)     Gout, arthritis     Helicobacter pylori gastritis     Hypertension     Osteoarthritis     Pulmonary HTN     Renal insufficiency        Past Surgical History:  Past Surgical History:   Procedure Laterality Date     CHOLECYSTECTOMY      endometrial polyp      FETAL BLOOD TRANSFUSION  12/11/2015    2 pints    THYROID SURGERY           Family History:  Family History   Problem Relation Age of Onset    Liver cancer Mother     Cancer Mother     Liver cancer Sister     Breast cancer Sister     Cancer Sister     Glaucoma Sister     Thyroid disease Sister     Melanoma Neg Hx     Eczema Neg Hx     Lupus Neg Hx     Psoriasis Neg Hx        Social History:  Social History     Social History Main Topics    Smoking status: Never Smoker    Smokeless tobacco: Never Used    Alcohol use No    Drug use: No    Sexual activity: Not on file       ROS   Review of Systems   Constitutional: Negative for chills and fever.   Respiratory: Negative for cough and shortness of breath.    Cardiovascular: Negative for chest pain and leg swelling.   Gastrointestinal: Negative for abdominal pain, diarrhea, nausea and vomiting.   Musculoskeletal: Negative for back pain, neck pain and neck stiffness.   Skin: Negative for rash and wound.   Neurological: Positive for headaches. Negative for dizziness, light-headedness and numbness.   All other systems reviewed and are negative.    Physical Exam      Initial Vitals [05/24/18 0712]   BP Pulse Resp Temp SpO2   (!) 142/86 108 16 100.3 °F (37.9 °C) 98 %      MAP       104.67          Physical Exam  Nursing Notes and Vital Signs Reviewed.  Constitutional: Patient is in no apparent distress. Well-developed and well-nourished.  Head: Atraumatic. Normocephalic.  Eyes: PERRL. EOM intact. Conjunctivae are not pale. No scleral icterus.  ENT: Mucous membranes are moist. Oropharynx is clear and symmetric.    Neck: Supple. Full ROM. No lymphadenopathy. No nuchal rigidity.  Cardiovascular: Tachycardic. Regular rhythm. No murmurs, rubs, or gallops. Distal pulses are 2+ and symmetric.  Pulmonary/Chest: No respiratory distress. Clear to auscultation bilaterally. No wheezing or rales.  Abdominal: Soft and  non-distended.  There is no tenderness.  No rebound, guarding, or rigidity.   Musculoskeletal: Moves all extremities. No obvious deformities. No edema. No calf tenderness.  Skin: Warm and dry.  Neurological: Patient is alert and oriented to person, place and time. Pupils ERRL and EOM normal. Cranial nerves II-XII are intact. Strength is full bilaterally; it is equal and 5/5 in bilateral upper and lower extremities. Normal Finger-to-Nose test. Speech is clear and normal. No acute focal neurological deficits noted.  Psychiatric: Normal affect. Good eye contact. Appropriate in content.    ED Course    Procedures  ED Vital Signs:  Vitals:    05/24/18 0712 05/24/18 0734 05/24/18 0735 05/24/18 0800   BP: (!) 142/86  (!) 152/81 139/67   Pulse: 108 110 (!) 111 107   Resp: 16  (!) 22 (!) 35   Temp: 100.3 °F (37.9 °C)      TempSrc: Oral      SpO2: 98%  100% 100%    05/24/18 0805 05/24/18 0930   BP:  123/67   Pulse:  91   Resp:  (!) 23   Temp: 100 °F (37.8 °C) 99 °F (37.2 °C)   TempSrc:  Oral   SpO2:  98%       Abnormal Lab Results:  Labs Reviewed   CBC W/ AUTO DIFFERENTIAL - Abnormal; Notable for the following:        Result Value    RBC 3.66 (*)     Hemoglobin 8.5 (*)     Hematocrit 26.7 (*)     MCV 73 (*)     MCH 23.2 (*)     MCHC 31.8 (*)     RDW 19.8 (*)     Mono # 1.3 (*)     Mono% 16.3 (*)     All other components within normal limits   URINALYSIS - Abnormal; Notable for the following:     Specific Gravity, UA <=1.005 (*)     Protein, UA 1+ (*)     All other components within normal limits   COMPREHENSIVE METABOLIC PANEL - Abnormal; Notable for the following:     CO2 18 (*)     BUN, Bld 58 (*)     Creatinine 5.1 (*)     Total Protein 8.7 (*)     Albumin 3.1 (*)     eGFR if  8 (*)     eGFR if non  7 (*)     All other components within normal limits   CULTURE, BLOOD   CULTURE, BLOOD   CULTURE, URINE   URINALYSIS MICROSCOPIC   LACTIC ACID, PLASMA   LACTIC ACID, PLASMA        All Lab  Results:  Results for orders placed or performed during the hospital encounter of 05/24/18   CBC auto differential   Result Value Ref Range    WBC 7.77 3.90 - 12.70 K/uL    RBC 3.66 (L) 4.00 - 5.40 M/uL    Hemoglobin 8.5 (L) 12.0 - 16.0 g/dL    Hematocrit 26.7 (L) 37.0 - 48.5 %    MCV 73 (L) 82 - 98 fL    MCH 23.2 (L) 27.0 - 31.0 pg    MCHC 31.8 (L) 32.0 - 36.0 g/dL    RDW 19.8 (H) 11.5 - 14.5 %    Platelets 335 150 - 350 K/uL    MPV 9.4 9.2 - 12.9 fL    Gran # (ANC) 3.8 1.8 - 7.7 K/uL    Lymph # 2.5 1.0 - 4.8 K/uL    Mono # 1.3 (H) 0.3 - 1.0 K/uL    Eos # 0.1 0.0 - 0.5 K/uL    Baso # 0.05 0.00 - 0.20 K/uL    Gran% 49.9 38.0 - 73.0 %    Lymph% 31.8 18.0 - 48.0 %    Mono% 16.3 (H) 4.0 - 15.0 %    Eosinophil% 1.8 0.0 - 8.0 %    Basophil% 0.6 0.0 - 1.9 %    Differential Method Automated    Urinalysis   Result Value Ref Range    Specimen UA Urine, Catheterized     Color, UA Yellow Yellow, Straw, Fabiana    Appearance, UA Clear Clear    pH, UA 7.0 5.0 - 8.0    Specific Gravity, UA <=1.005 (A) 1.005 - 1.030    Protein, UA 1+ (A) Negative    Glucose, UA Negative Negative    Ketones, UA Negative Negative    Bilirubin (UA) Negative Negative    Occult Blood UA Negative Negative    Nitrite, UA Negative Negative    Urobilinogen, UA Negative <2.0 EU/dL    Leukocytes, UA Negative Negative   Urinalysis Microscopic   Result Value Ref Range    RBC, UA 2 0 - 4 /hpf    WBC, UA 1 0 - 5 /hpf    Bacteria, UA Rare None-Occ /hpf    Squam Epithel, UA 2 /hpf    Hyaline Casts, UA 0 0-1/lpf /lpf    Microscopic Comment SEE COMMENT    Comprehensive metabolic panel   Result Value Ref Range    Sodium 138 136 - 145 mmol/L    Potassium 4.8 3.5 - 5.1 mmol/L    Chloride 108 95 - 110 mmol/L    CO2 18 (L) 23 - 29 mmol/L    Glucose 88 70 - 110 mg/dL    BUN, Bld 58 (H) 8 - 23 mg/dL    Creatinine 5.1 (H) 0.5 - 1.4 mg/dL    Calcium 9.2 8.7 - 10.5 mg/dL    Total Protein 8.7 (H) 6.0 - 8.4 g/dL    Albumin 3.1 (L) 3.5 - 5.2 g/dL    Total Bilirubin 0.4 0.1 - 1.0  mg/dL    Alkaline Phosphatase 73 55 - 135 U/L    AST 25 10 - 40 U/L    ALT 15 10 - 44 U/L    Anion Gap 12 8 - 16 mmol/L    eGFR if African American 8 (A) >60 mL/min/1.73 m^2    eGFR if non African American 7 (A) >60 mL/min/1.73 m^2   Lactic acid, plasma   Result Value Ref Range    Lactate (Lactic Acid) 1.9 0.5 - 2.2 mmol/L       Imaging Results:  Imaging Results          X-Ray Chest AP Portable (Final result)  Result time 05/24/18 08:53:47    Final result by CARLO Lincoln Sr., MD (05/24/18 08:53:47)                 Impression:      1. The lungs are clear.  2. The size of the heart is prominent.  This may be secondary to magnification.  3. There is a surgical clip projected over the superior aspect of the left hemithorax.  .      Electronically signed by: Jack Lincoln MD  Date:    05/24/2018  Time:    08:53             Narrative:    EXAMINATION:  XR CHEST AP PORTABLE    CLINICAL HISTORY:  Sepsis;    COMPARISON:  None    FINDINGS:  The size of the heart is prominent.  The lungs are clear. There is no pneumothorax.  The costophrenic angles are sharp.  There is a surgical clip projected over the superior aspect of the left hemithorax.                               CT Head Without Contrast (Final result)  Result time 05/24/18 08:30:59    Final result by Chacorta Anton III, MD (05/24/18 08:30:59)                 Impression:      Atrophy with bilateral white matter changes and scattered lacuna most likely related to microvascular disease.  No bleed or other acute intracranial event suggested.    All CT scans at this facility use dose modulation, iterative reconstruction, and/or weight based dosing when appropriate to reduce radiation dose to as low as reasonably achievable.      Electronically signed by: Chacorta Anton MD  Date:    05/24/2018  Time:    08:30             Narrative:    EXAMINATION:  CT HEAD WITHOUT CONTRAST    CLINICAL HISTORY:  Altered mental status,  unspecifiedheadache;    TECHNIQUE:  Standard noncontrast CT scan of the brain.    FINDINGS:  The scan is degraded by motion artifact.  There are again generalized changes of atrophy, both supra and infratentorial.  Changes of low attenuation are noted within the periventricular and subcortical white matter with scattered lacuna, greater on the right.  No hemorrhage, mass lesion, hydrocephalus, or midline shift.  No acute/depressed skull fracture.                                 The EKG was ordered, reviewed, and independently interpreted by the ED provider.  Interpretation time: 0810  Rate: 110 BPM  Rhythm: sinus tachycardia  Interpretation: QRS, axis, ST segments, and T waves are all normal. No STEMI.             The Emergency Provider reviewed the vital signs and test results, which are outlined above.    ED Discussion     9:22 AM: Re-evaluated pt. Pt is resting comfortably and is in no acute distress.  Pt states she still had a HA, but is much more relaxed. Pt is no longer tachycardic.  D/w pt all pertinent results. D/w pt any concerns expressed at this time. Answered all questions. Pt expresses understanding at this time.    10:05 AM: Reassessed pt at this time. Pt is awake, alert, and in no distress. Discussed with pt all pertinent ED information and results. Discussed pt dx and plan of tx. Gave pt all f/u and return to the ED instructions. All questions and concerns were addressed at this time. Pt expresses understanding of information and instructions, and is comfortable with plan to discharge. Pt is stable for discharge.    I discussed with patient and/or family/caretaker that evaluation in the ED does not suggest any emergent or life threatening medical conditions requiring immediate intervention beyond what was provided in the ED, and I believe patient is safe for discharge.  Regardless, an unremarkable evaluation in the ED does not preclude the development or presence of a serious of life threatening  condition. As such, patient was instructed to return immediately for any worsening or change in current symptoms.    Patient's headache is either consistent with previous headache and/or lacks features concerning for emergent or life threatening condition.  I do not suspect SAH, meningitis, increased IC pressure, infectious, toxic, vascular, CNS, or other EMC.  I have discussed this at length with patient and/or family/caretaker.      ED Medication(s):  Medications   acetaminophen tablet 1,000 mg (1,000 mg Oral Given 5/24/18 0805)       New Prescriptions    No medications on file       Follow-up Information     Derrick Desouza MD In 1 day.    Specialty:  Family Medicine  Why:  Patient should return to the ED for any concerns or worsening of condition.  Contact information:  [address]  [place] [address] [phone]                     Medical Decision Making    Medical Decision Making:   Clinical Tests:   Lab Tests: Ordered and Reviewed  Radiological Study: Ordered and Reviewed  Medical Tests: Ordered and Reviewed           Scribe Attestation:   Scribe #1: I performed the above scribed service and the documentation accurately describes the services I performed. I attest to the accuracy of the note.    Attending:   Physician Attestation Statement for Scribe #1: I, Saul Olivas MD, personally performed the services described in this documentation, as scribed by Corinne Mack, in my presence, and it is both accurate and complete.          Clinical Impression       ICD-10-CM ICD-9-CM   1. Chronic renal failure, unspecified CKD stage N18.9 585.9   2. Headache R51 784.0       Disposition:   Disposition: Discharged  Condition: Stable           Saul Olivas MD  05/25/18 0656

## 2018-05-25 ENCOUNTER — INFUSION (OUTPATIENT)
Dept: INFUSION THERAPY | Facility: HOSPITAL | Age: 83
End: 2018-05-25
Attending: INTERNAL MEDICINE
Payer: COMMERCIAL

## 2018-05-25 ENCOUNTER — OFFICE VISIT (OUTPATIENT)
Dept: URGENT CARE | Facility: CLINIC | Age: 83
End: 2018-05-25
Payer: COMMERCIAL

## 2018-05-25 VITALS
HEIGHT: 62 IN | RESPIRATION RATE: 18 BRPM | SYSTOLIC BLOOD PRESSURE: 150 MMHG | OXYGEN SATURATION: 100 % | TEMPERATURE: 100 F | WEIGHT: 143.75 LBS | HEART RATE: 90 BPM | BODY MASS INDEX: 26.45 KG/M2 | DIASTOLIC BLOOD PRESSURE: 90 MMHG

## 2018-05-25 VITALS
TEMPERATURE: 102 F | DIASTOLIC BLOOD PRESSURE: 93 MMHG | SYSTOLIC BLOOD PRESSURE: 153 MMHG | OXYGEN SATURATION: 98 % | HEART RATE: 129 BPM

## 2018-05-25 DIAGNOSIS — R30.0 DYSURIA: ICD-10-CM

## 2018-05-25 DIAGNOSIS — R50.9 FEVER OF UNKNOWN ORIGIN: Primary | ICD-10-CM

## 2018-05-25 DIAGNOSIS — D63.1 ANEMIA OF CHRONIC RENAL FAILURE, STAGE 3 (MODERATE): Primary | ICD-10-CM

## 2018-05-25 DIAGNOSIS — N18.30 ANEMIA OF CHRONIC RENAL FAILURE, STAGE 3 (MODERATE): Primary | ICD-10-CM

## 2018-05-25 LAB — BACTERIA UR CULT: NO GROWTH

## 2018-05-25 PROCEDURE — 87186 SC STD MICRODIL/AGAR DIL: CPT

## 2018-05-25 PROCEDURE — 99214 OFFICE O/P EST MOD 30 MIN: CPT | Mod: 25,S$GLB,, | Performed by: FAMILY MEDICINE

## 2018-05-25 PROCEDURE — 99999 PR PBB SHADOW E&M-EST. PATIENT-LVL V: CPT | Mod: PBBFAC,,, | Performed by: FAMILY MEDICINE

## 2018-05-25 PROCEDURE — 87077 CULTURE AEROBIC IDENTIFY: CPT

## 2018-05-25 PROCEDURE — 63600175 PHARM REV CODE 636 W HCPCS: Mod: JG,PO | Performed by: INTERNAL MEDICINE

## 2018-05-25 PROCEDURE — 87086 URINE CULTURE/COLONY COUNT: CPT

## 2018-05-25 PROCEDURE — 96372 THER/PROPH/DIAG INJ SC/IM: CPT | Mod: PO

## 2018-05-25 PROCEDURE — 87088 URINE BACTERIA CULTURE: CPT

## 2018-05-25 PROCEDURE — 96372 THER/PROPH/DIAG INJ SC/IM: CPT | Mod: S$GLB,,, | Performed by: FAMILY MEDICINE

## 2018-05-25 RX ORDER — CEFTRIAXONE 1 G/1
1 INJECTION, POWDER, FOR SOLUTION INTRAMUSCULAR; INTRAVENOUS
Status: COMPLETED | OUTPATIENT
Start: 2018-05-25 | End: 2018-05-25

## 2018-05-25 RX ORDER — ACETAMINOPHEN 325 MG/1
650 TABLET ORAL
Status: COMPLETED | OUTPATIENT
Start: 2018-05-25 | End: 2018-05-25

## 2018-05-25 RX ADMIN — CEFTRIAXONE 1 G: 1 INJECTION, POWDER, FOR SOLUTION INTRAMUSCULAR; INTRAVENOUS at 05:05

## 2018-05-25 RX ADMIN — ACETAMINOPHEN 650 MG: 325 TABLET ORAL at 05:05

## 2018-05-25 RX ADMIN — ERYTHROPOIETIN 40000 UNITS: 40000 INJECTION, SOLUTION INTRAVENOUS; SUBCUTANEOUS at 04:05

## 2018-05-25 NOTE — PROGRESS NOTES
"Subjective:       Patient ID: Gloria Sanz is a 86 y.o. female.    Chief Complaint: Headache and Fever    BP (!) 150/90   Pulse 90   Temp 100.1 °F (37.8 °C)   Resp 18   Ht 5' 2" (1.575 m)   Wt 65.2 kg (143 lb 11.8 oz)   SpO2 100%   BMI 26.29 kg/m²     HPI  85 yo pt sent down to urgent care by [facility] for fever of 102, headache,  just now, when she was receiving her epogen injection. Family states she went to ER yesterday for the some symptoms, had extensive work up, including urine culture, blood culture and CXR, all negative so far, sent home after symptoms improved on tylenol. She has no running nose/congestion/sore throat or any other URI Sx. Pt reports burning when urinating  today that was absent yesterday. Daughter says pt received in and out cath for urine culture. Daughter believes this maybe the cause of dysuria. Daughter also reports pt is stressed   PMH reviewed, significant for hematological, renal and cardiovascular conditions.    Review of Systems   Constitutional: Positive for activity change and fever.   Respiratory: Positive for shortness of breath. Negative for wheezing.    Genitourinary: Positive for dysuria and frequency.   Neurological: Positive for weakness and headaches.       Objective:      Physical Exam   Constitutional: She is oriented to person, place, and time. She appears well-developed and well-nourished. She appears distressed (patient rocks back and forth at times).   HENT:   Head: Normocephalic and atraumatic.   Eyes: Conjunctivae and EOM are normal. Pupils are equal, round, and reactive to light. Right eye exhibits no discharge. Left eye exhibits no discharge.   Neck: Normal range of motion. Neck supple. No JVD present.   Cardiovascular:   (+) NE III/IV, HR 99   Pulmonary/Chest: Effort normal and breath sounds normal. No respiratory distress. She has no wheezes. She has no rales.   POX 99%   Musculoskeletal: Normal range of motion. She exhibits no edema. "   Lymphadenopathy:     She has no cervical adenopathy.   Neurological: She is alert and oriented to person, place, and time.   Skin: Skin is warm and dry. No rash noted. She is not diaphoretic.   Nursing note and vitals reviewed.      Assessment:       1. Fever of unknown origin    2. Dysuria        Plan:     Gloria was seen today for headache and fever.    Diagnoses and all orders for this visit:    Fever of unknown origin - w/u from ER yesterday so far all negative  -     acetaminophen tablet 650 mg; Take 2 tablets (650 mg total) by mouth one time.  -     cefTRIAXone injection 1 g; Inject 1 g into the muscle one time.  -     Cancel: POCT URINE DIPSTICK WITH MICROSCOPE, AUTOMATED  -     Urine culture  Dysuria - new today, s/p cath    Instructions  1. Follow up with PCP next week  2. Over the counter tylenol 650 mg every 6 hours as needed for fever or headaches    Discussed with pt/family all information and results pertaining to this visit. Discussed diagnosis and plan of treatment.  All questions and concerns were addressed at this time. Pt/family expresses understanding of information and instructions.  Care and follow up instruction provided.

## 2018-05-25 NOTE — NURSING
5/25/18 @ 1600-Pt appears very weak, barely able to walk and very sob.  V/S obtained. I told pt to go to urgent care now, both agreeable.  Spoke with pts dtr, Lakesha, via phone, explained why pt being sent to .  Dtr agrees states she's on her way.  Pt left unit in w/c with , [facility].

## 2018-05-25 NOTE — PROGRESS NOTES
After obtaining consent, and per orders of  DR. ROMERO , injection of SEE MAR  given by Jodee Mckeon. Patient instructed to remain in clinic for 20 minutes afterwards, and to report any adverse reaction to me immediately.

## 2018-05-25 NOTE — PATIENT INSTRUCTIONS
Febrile Illness, Uncertain Cause (Adult)  You have a fever, but the cause is not certain. A fever is a natural reaction of the body to an illness such as infection due to a virus or bacteria. In most cases, the temperature itself is not harmful. It actually helps the body fight infections. A fever does not need to be treated unless you feel very uncomfortable.  Sometimes a fever can be an early sign of a more serious infection, so make sure to follow up if your condition worsens.  Home care  Unless given other instructions by your healthcare provider, follow these guidelines when caring for yourself at home.  General care  · If your symptoms are severe, rest at home for the first 2 to 3 days. When you resume activity, don't let yourself get too tired.  · Do not smoke. Also avoid being exposed to secondhand smoke.  · Your appetite may be poor, so a light diet is fine. Avoid dehydration by drinking 6 to 8 glasses of fluids per day (such as water, soft drinks, sports drinks, juices, tea, or soup). Extra fluids will help loosen secretions in the nose and lungs.  Medicines  · You can take acetaminophen or ibuprofen for pain, unless you were given a different fever-reducing/pain medicine to use. (Note: If you have chronic liver or kidney disease or have ever had a stomach ulcer or gastrointestinal bleeding, talk with your healthcare provider before using these medicines. Also talk to your provider if you are taking medicine to prevent blood clots.) Aspirin should never be given to anyone younger than 18 years of age who is ill with a viral infection or fever. It may cause severe liver or brain damage.  · If you were given antibiotics, take them until they are used up, or your healthcare provider tells you to stop. It is important to finish the antibiotics even though you feel better. This is to make sure the infection has cleared. Be aware that antibiotics are not usually given for a fever with an unknown  cause.  · Over-the-counter medicines will not shorten the duration of the illness. However, they may be helpful for the following symptoms: cough, sore throat, or nasal and sinus congestion. Ask your pharmacist for product suggestions. (Note: Do not use decongestants if you have high blood pressure.)  Follow-up care  Follow up with your healthcare provider, or as advised.  · If a culture was done, you will be notified if your treatment needs to be changed. You can call as directed for the results.  · If X-rays, a CT, or an ultrasound were done, a specialist will review them. You will be notified of any findings that may affect your care.  Call 911  Contact emergency services right away if any of these occur:  · Trouble breathing or swallowing, or wheezing  · Chest pain  · Confusion  · Extreme drowsiness or trouble awakening  · Fainting or loss of consciousness  · Rapid heart rate  · Low blood pressure  · Vomiting blood, or large amounts of blood in stool  · Seizure  When to seek medical advice  Call your healthcare provider right away if any of these occur:  · Cough with lots of colored sputum (mucus) or blood in your sputum  · Severe headache  · Face, neck, throat, or ear pain  · Feeling drowsy  · Abdominal pain  · Repeated vomiting or diarrhea  · Joint pain or a new rash  · Burning when urinating  · Fever of 100.4°F (38°C) or higher, that does not get better after taking fever-reducing medicine  · Feeling weak or dizzy  Date Last Reviewed: 7/30/2015  © 3666-1528 [employer]. [address] [place] [address], [place] [address]. All rights reserved. This information is not intended as a substitute for professional medical care. Always follow your healthcare professional's instructions.

## 2018-05-28 ENCOUNTER — OFFICE VISIT (OUTPATIENT)
Dept: INTERNAL MEDICINE | Facility: CLINIC | Age: 83
End: 2018-05-28
Payer: COMMERCIAL

## 2018-05-28 VITALS
TEMPERATURE: 97 F | SYSTOLIC BLOOD PRESSURE: 130 MMHG | DIASTOLIC BLOOD PRESSURE: 66 MMHG | HEART RATE: 90 BPM | WEIGHT: 142.19 LBS | BODY MASS INDEX: 25.2 KG/M2 | HEIGHT: 63 IN

## 2018-05-28 DIAGNOSIS — N30.00 ACUTE CYSTITIS WITHOUT HEMATURIA: Primary | ICD-10-CM

## 2018-05-28 LAB — BACTERIA UR CULT: NORMAL

## 2018-05-28 PROCEDURE — 99999 PR PBB SHADOW E&M-EST. PATIENT-LVL III: CPT | Mod: PBBFAC,,, | Performed by: FAMILY MEDICINE

## 2018-05-28 PROCEDURE — 99213 OFFICE O/P EST LOW 20 MIN: CPT | Mod: S$GLB,,, | Performed by: FAMILY MEDICINE

## 2018-05-28 NOTE — PROGRESS NOTES
Subjective:       Patient ID: Gloria Sanz is a 86 y.o. female.    Chief Complaint: Follow-up    F/U:      Pt is a 86 year old who had  UTI that was causing secondary symptoms which are now gone after rocephin 250 shot. Pt E. Coli was organism that grew from culture.      Review of Systems   Constitutional: Negative.    Respiratory: Negative.    Cardiovascular: Negative.    Genitourinary: Negative.    Neurological: Negative.    Hematological: Negative.        Objective:      Physical Exam   Constitutional: She is oriented to person, place, and time. She appears well-developed and well-nourished.   Cardiovascular: Normal rate and regular rhythm.    Pulmonary/Chest: Effort normal and breath sounds normal.   Abdominal: Soft. Bowel sounds are normal.   Neurological: She is alert and oriented to person, place, and time.   Skin: Skin is warm and dry.       Assessment:       1. Acute cystitis without hematuria        Plan:       Acute cystitis without hematuria  Comments:  Will repeat the U/A and culture  Orders:  -     Urinalysis; Future; Expected date: 06/07/2018  -     Urine culture; Future; Expected date: 06/07/2018

## 2018-05-29 LAB — BACTERIA BLD CULT: NORMAL

## 2018-06-08 ENCOUNTER — INFUSION (OUTPATIENT)
Dept: INFUSION THERAPY | Facility: HOSPITAL | Age: 83
End: 2018-06-08
Attending: INTERNAL MEDICINE
Payer: COMMERCIAL

## 2018-06-08 ENCOUNTER — OFFICE VISIT (OUTPATIENT)
Dept: HEMATOLOGY/ONCOLOGY | Facility: CLINIC | Age: 83
End: 2018-06-08
Payer: COMMERCIAL

## 2018-06-08 ENCOUNTER — LAB VISIT (OUTPATIENT)
Dept: LAB | Facility: HOSPITAL | Age: 83
End: 2018-06-08
Attending: INTERNAL MEDICINE
Payer: COMMERCIAL

## 2018-06-08 VITALS
HEIGHT: 63 IN | WEIGHT: 139.31 LBS | WEIGHT: 139.31 LBS | SYSTOLIC BLOOD PRESSURE: 123 MMHG | HEIGHT: 63 IN | HEART RATE: 114 BPM | RESPIRATION RATE: 20 BRPM | BODY MASS INDEX: 24.68 KG/M2 | DIASTOLIC BLOOD PRESSURE: 82 MMHG | BODY MASS INDEX: 24.68 KG/M2 | TEMPERATURE: 98 F | OXYGEN SATURATION: 99 %

## 2018-06-08 DIAGNOSIS — D63.1 ANEMIA OF CHRONIC RENAL FAILURE, STAGE 4 (SEVERE): Primary | ICD-10-CM

## 2018-06-08 DIAGNOSIS — N18.4 ANEMIA OF CHRONIC RENAL FAILURE, STAGE 4 (SEVERE): ICD-10-CM

## 2018-06-08 DIAGNOSIS — D63.1 ANEMIA OF CHRONIC RENAL FAILURE, STAGE 3 (MODERATE): Primary | ICD-10-CM

## 2018-06-08 DIAGNOSIS — N18.4 ANEMIA OF CHRONIC RENAL FAILURE, STAGE 4 (SEVERE): Primary | ICD-10-CM

## 2018-06-08 DIAGNOSIS — D63.1 ANEMIA OF CHRONIC RENAL FAILURE, STAGE 4 (SEVERE): ICD-10-CM

## 2018-06-08 DIAGNOSIS — N18.30 ANEMIA OF CHRONIC RENAL FAILURE, STAGE 3 (MODERATE): Primary | ICD-10-CM

## 2018-06-08 LAB
BASOPHILS # BLD AUTO: 0.01 K/UL
BASOPHILS NFR BLD: 0.3 %
DIFFERENTIAL METHOD: ABNORMAL
EOSINOPHIL # BLD AUTO: 0 K/UL
EOSINOPHIL NFR BLD: 0 %
ERYTHROCYTE [DISTWIDTH] IN BLOOD BY AUTOMATED COUNT: 18.8 %
HCT VFR BLD AUTO: 26.9 %
HGB BLD-MCNC: 8.8 G/DL
LYMPHOCYTES # BLD AUTO: 0.8 K/UL
LYMPHOCYTES NFR BLD: 23.9 %
MCH RBC QN AUTO: 23.3 PG
MCHC RBC AUTO-ENTMCNC: 32.7 G/DL
MCV RBC AUTO: 71 FL
MONOCYTES # BLD AUTO: 0.1 K/UL
MONOCYTES NFR BLD: 1.5 %
NEUTROPHILS # BLD AUTO: 2.5 K/UL
NEUTROPHILS NFR BLD: 74.3 %
PLATELET # BLD AUTO: 289 K/UL
PMV BLD AUTO: 8.9 FL
RBC # BLD AUTO: 3.78 M/UL
WBC # BLD AUTO: 3.39 K/UL

## 2018-06-08 PROCEDURE — 99214 OFFICE O/P EST MOD 30 MIN: CPT | Mod: 25,S$GLB,, | Performed by: INTERNAL MEDICINE

## 2018-06-08 PROCEDURE — 63600175 PHARM REV CODE 636 W HCPCS: Mod: JG,PO | Performed by: INTERNAL MEDICINE

## 2018-06-08 PROCEDURE — 99999 PR PBB SHADOW E&M-EST. PATIENT-LVL IV: CPT | Mod: PBBFAC,,, | Performed by: INTERNAL MEDICINE

## 2018-06-08 PROCEDURE — 96372 THER/PROPH/DIAG INJ SC/IM: CPT | Mod: PO

## 2018-06-08 PROCEDURE — 36415 COLL VENOUS BLD VENIPUNCTURE: CPT | Mod: PO

## 2018-06-08 PROCEDURE — 85025 COMPLETE CBC W/AUTO DIFF WBC: CPT | Mod: PO

## 2018-06-08 RX ADMIN — ERYTHROPOIETIN 40000 UNITS: 40000 INJECTION, SOLUTION INTRAVENOUS; SUBCUTANEOUS at 01:06

## 2018-06-08 NOTE — PROGRESS NOTES
Subjective:       Patient ID: Gloria Sanz is a 86 y.o. female.    Chief Complaint: No chief complaint on file.    HPI   This 86 year old AA lady comes for follow uo her anemia associated to chronic renal failure.  She is known to us because in MAy 2010 had a cbc that showed a hemoglobin of 10.6 with an MCV of 79.4. White cell count was 4,380 (differential included 17% monocytes) and a platelet count of 217,000 Review of information in the computer indicated that the patient has had mild anemia dating back a number of years\.    In 1991, her hemoglobin was 11.7 with an MCV of 84. Her MCV through the years has been anywhere from 79-84. The patient has had several iron studies that showed an elevated ferritin, a creatinine of 1.7 as well as serum protein electrophoresis that has failed to show a monoclonal spike.    . B12 levels have been normal.    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    The patient comes today for routine follow up.    At the end dec 2011 she had a severe epistaxis requiring a blood transfusion. She ended up having surgery for nasal polyps In May 2012 she ha had a drop of hr HGB to 8.8 and her creatinine was 1.8 She slowly improved ion her own. SPEp showed a polyclonal gammopathy and her free light chains showed elevations of both the kappa and lambda light chains which goes against clonality.       She had the zoster vaccine in 2008    She is on procrit every 2 weeks when needed    She is know to have a sub-centimeter pulmonary nodule in the right middle lobe, Ct scan in December 2015 showed stability.A CT scan done 3/1027 showed a new nodule, measuring 4.6 mm in the right lung.  She had a Ct scan in early April 2018 which showed stability of thee lung nodules but a new  Probable 3.1 cm lesion in the  lefy kidney. She had an US in 4/2018  that  showed multiple cysts but no solid masses.  She has recurrent epistaxis which is felt to be due to HBP     She comes for follow up to see if she will need procrit.    Her creatinine has gone up and now is around 5.0  She will follow up with nephrology tomorrow     ALLERGIES: See med card  MEDICATIONS: See MedCard.  PREVIOUS SURGERIES: Thyroid nodule removed many years ago and endometrial  polyp removed in 2009.  Nasal polyps surgery around april 2012  SOCIAL HISTORY: She is  with three children. She lives in [place].  She does not smoke or drink. 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  FAMILY HISTORY: Two sisters have liver cancer, and another one has breast  cancer. Mother had cancer of the gallbladder. Father had a heart attack.  No diabetes in the family.  PAST MEDICAL HISTORY:  1. 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  2. Alpha globin gene rearrangement test showed that she is an alpha  thalassemia carrier.  3. Chronic anemia.  4-elevated creatinine  5. Hypertension.  6. Status post removal of endometrial polyp.  7. Epistaxis-recurrent felt to be due to HBP.  8. hx of epistaxis Dec 2011 ( nasal polyps  8.renal cysts       Review of Systems   Constitutional: Negative.  Negative for appetite change and unexpected weight change.   HENT: Negative.    Eyes: Negative.  Negative for visual disturbance.   Respiratory: Negative.  Negative for cough, shortness of breath and wheezing.    Cardiovascular: Negative.  Negative for chest pain.   Gastrointestinal: Negative.  Negative for abdominal pain and diarrhea.   Genitourinary: Negative.  Negative for frequency.   Musculoskeletal: Negative for back pain.   Skin: Negative for rash.   Neurological: Negative.  Negative for headaches.   Hematological: Negative for adenopathy.   Psychiatric/Behavioral: Negative.  The patient is not nervous/anxious.        Objective:      Physical Exam   Constitutional: She  is oriented to person, place, and time. She appears well-developed. No distress.   HENT:   Head: Normocephalic.   Right Ear: Tympanic membrane, external ear and ear canal normal.   Left Ear: Tympanic membrane, external ear and ear canal normal.   Nose: Nose normal. Right sinus exhibits no maxillary sinus tenderness and no frontal sinus tenderness. Left sinus exhibits no maxillary sinus tenderness and no frontal sinus tenderness.   Mouth/Throat: Oropharynx is clear and moist and mucous membranes are normal.   Teeth normal.  Gums normal.   Eyes: Conjunctivae and lids are normal. Pupils are equal, round, and reactive to light.   Neck: Normal carotid pulses, no hepatojugular reflux and no JVD present. Carotid bruit is not present. No tracheal deviation present. No thyroid mass and no thyromegaly present.   Cardiovascular: Normal rate, regular rhythm, S1 normal, S2 normal, normal heart sounds and intact distal pulses.  Exam reveals no gallop and no friction rub.    No murmur heard.  Carotid exam normal   Pulmonary/Chest: Effort normal and breath sounds normal. No accessory muscle usage. No respiratory distress. She has no wheezes. She has no rales. She exhibits no tenderness.   Abdominal: Soft. Normal appearance. She exhibits no distension and no mass. There is no splenomegaly or hepatomegaly. There is no tenderness. There is no rebound and no guarding.   Musculoskeletal: Normal range of motion. She exhibits no edema or tenderness.        Right hand: Normal.        Left hand: Normal.       Lymphadenopathy:     She has no cervical adenopathy.     She has no axillary adenopathy.        Right: No inguinal and no supraclavicular adenopathy present.        Left: No inguinal and no supraclavicular adenopathy present.   Neurological: She is alert and oriented to person, place, and time. She has normal strength. No cranial nerve deficit. Coordination normal.   Skin: Skin is warm and dry. No rash noted. She is not diaphoretic. No  cyanosis or erythema. No pallor. Nails show no clubbing.   Psychiatric: She has a normal mood and affect. Her behavior is normal. Judgment and thought content normal.       Wt Readings from Last 3 Encounters:   06/08/18 63.2 kg (139 lb 5.3 oz)   05/28/18 64.5 kg (142 lb 3.2 oz)   05/25/18 65.2 kg (143 lb 11.8 oz)     Temp Readings from Last 3 Encounters:   06/08/18 98.2 °F (36.8 °C) (Oral)   05/28/18 96.7 °F (35.9 °C) (Tympanic)   05/25/18 100.1 °F (37.8 °C)     BP Readings from Last 3 Encounters:   06/08/18 123/82   05/28/18 130/66   05/25/18 (!) 150/90     Pulse Readings from Last 3 Encounters:   06/08/18 (!) 114   05/28/18 90   05/25/18 90       Assessment:       1. Anemia of chronic renal failure, stage 4 (severe)        Plan:       Lab Results   Component Value Date    WBC 3.39 (L) 06/08/2018    HGB 8.8 (L) 06/08/2018    HCT 26.9 (L) 06/08/2018    MCV 71 (L) 06/08/2018     06/08/2018     Lab Results   Component Value Date    CREATININE 5.1 (H) 05/24/2018     Lab Results   Component Value Date    FERRITIN 175 03/15/2018       She will meet with nephrology tomorrow.  Procrit 40,000 units today and in 2 weeks,  See me ion 4 weeks with a cbc, feritin and tibc

## 2018-06-08 NOTE — PATIENT INSTRUCTIONS
[facility]  [address]  [address] [address]  [phone] phone     [phone] fax  Hours of Operation: Monday- Friday 8:00am- 5:00pm  After hours phone  [phone]  Hematology / Oncology Physicians on call      Dr. Fernando Napoles                        Please call with any concerns regarding your appointment today.  HOME CARE AFTER CHEMOTHERAPY   Meals   Many patients feel sick and lose their appetites during treatment. Eat small meals several times a day. Choose bland foods with little taste or smell if you have problems with nausea. Be sure to cook all food thoroughly. This kills bacteria and helps you avoid intestinal infection. Soft foods are easier to swallow and digest.   Activity   Exercise keeps you strong and keeps your heart and lungs active. Talk to your doctor about an appropriate exercise program for you.   Skin Care   To prevent a skin infection, bathe or shower once a day. Use a moisturizing soap and wash with warm water. Avoid very hot or cold water. Chemotherapy can make your skin dry . Apply moisturizing lotion to help relieve dry skin. Some drugs used in high doses can cause slight burns to appear (like sunburn). Ask for a special cream to help relieve the burn and protect your skin.   Prevent Mouth Sores   During chemotherapy, many people get mouth sores. Do the following to help prevent mouth sores or to ease discomfort.   Brush your teeth with a soft-bristle toothbrush after every meal.  Don't use dental floss if your platelet count is below 50,000. Your doctor or nurse will tell you if this is the case.  Use an oral swab or special soft toothbrush if your gums bleed during regular brushing.  Use mouthwash as directed. If you can't tolerate commercial mouthwash, use salt and baking soda to clean your mouth. Mix 1 teaspoon of salt and 1 teaspoon of baking soda into a glass of water. Swish and spit.  Call your doctor  or return to this facility if you develop any of the following:   Sore throat   White patches in the mouth or throat   Fever of 100.4ºF (38ºC) or higher, or as directed by your healthcare provider  © 6487-6948 [employer], [address] [place] [address], [place] [address]. All rights reserved. This information is not intended as a substitute for professional medical care. Always follow your healthcare professional's

## 2018-06-09 ENCOUNTER — OFFICE VISIT (OUTPATIENT)
Dept: NEPHROLOGY | Facility: CLINIC | Age: 83
End: 2018-06-09
Payer: COMMERCIAL

## 2018-06-09 VITALS
HEART RATE: 84 BPM | SYSTOLIC BLOOD PRESSURE: 124 MMHG | BODY MASS INDEX: 26.21 KG/M2 | DIASTOLIC BLOOD PRESSURE: 68 MMHG | RESPIRATION RATE: 20 BRPM | WEIGHT: 142.44 LBS | HEIGHT: 62 IN

## 2018-06-09 DIAGNOSIS — N17.9 AKI (ACUTE KIDNEY INJURY): ICD-10-CM

## 2018-06-09 DIAGNOSIS — N18.4 CKD (CHRONIC KIDNEY DISEASE) STAGE 4, GFR 15-29 ML/MIN: Primary | ICD-10-CM

## 2018-06-09 DIAGNOSIS — E87.20 ACIDOSIS, METABOLIC: ICD-10-CM

## 2018-06-09 PROCEDURE — 99214 OFFICE O/P EST MOD 30 MIN: CPT | Mod: S$GLB,,, | Performed by: INTERNAL MEDICINE

## 2018-06-09 PROCEDURE — 99999 PR PBB SHADOW E&M-EST. PATIENT-LVL III: CPT | Mod: PBBFAC,,, | Performed by: INTERNAL MEDICINE

## 2018-06-09 NOTE — PROGRESS NOTES
PROGRESS NOTE FOR ESTABLISHED PATIENT    PHYSICIAN REQUESTING THE CONSULT: Dr. Macedo    REASON FOR VISIT: Renal insufficiency    86 y.o. female with history of HTN, gout, osteoarthritis, anemia, asthma, pulmon. HTN, CKD presents to the renal clinic for evaluation of renal insufficiency.     Patient had recent hospital stay at [facility] on 5/6/18 when she presented with LE edema. She was discharged on Lasix 20 mg po daily and LE edema has now largely resolved.    Patient also with UTI since last visit. She received antibiotics and dysuria has now resolved. No complaints today.           Past Medical History   Diagnosis Date    Hypertension     Gout, arthritis     Osteoarthritis     Anemia      sickle cell trait, alpha thalessemia    Helicobacter pylori gastritis     GERD (gastroesophageal reflux disease)     Asthma, chronic     Pulmonary HTN     Renal insufficiency     Cataract        Past Surgical History   Procedure Laterality Date    Thyroid surgery      Endometrial polyp         Allergies   Allergen Reactions    Ace Inhibitors Nausea And Vomiting    Amlodipine Edema    Metronidazole Nausea And Vomiting    Phenytoin Sodium Extended Nausea And Vomiting       Current Outpatient Prescriptions   Medication Sig Dispense Refill    alendronate (FOSAMAX) 70 MG tablet Take 1 tablet once weekly in the morning with a full glass of water on an empty stomach. Do not consume food or lie down for at least 30 minutes afterwards.  4 tablet  6    allopurinol (ZYLOPRIM) 100 MG tablet Take 1 tablet (100 mg total) by mouth once daily. 1 Tablet Oral Every day  30 tablet  6    B complex vitamins (B COMPLEX) TbSR Take by mouth. 1 Tablet Extended Release Oral Every day        CALCIUM CARBONATE/VITAMIN D3 (CALCIUM 500 + D, D3, ORAL) Take by mouth. 1 Tablet Oral Twice a day        cyclobenzaprine (FLEXERIL) 5 MG tablet Take 1 tablet (5 mg total) by mouth daily as needed for Muscle spasms. May cause sedation - use with  caution  30 tablet  0    desonide (DESOWEN) 0.05 % cream     0    losartan (COZAAR) 100 MG tablet Take 1 tablet (100 mg total) by mouth once daily. 1 Tablet Oral daiy  30 tablet  6    mometasone (ASMANEX TWISTHALER) 220 mcg (60 doses) AePB Inhale into the lungs. 2 Aerosol Powdr Breath Activated Inhalation Every evening.  Always rinse mouth or brush teeth afteruse.Use every day as directed.        nebivolol (BYSTOLIC) 5 MG Tab Take 1 tablet (5 mg total) by mouth once daily.  30 tablet  3    nystatin-triamcinolone (MYCOLOG) ointment Apply topically 2 (two) times daily as needed.  15 g  0    omeprazole (PRILOSEC) 20 MG capsule Take 1 capsule (20 mg total) by mouth once daily.  30 capsule  6     No current facility-administered medications for this visit.       Family History   Problem Relation Age of Onset    Liver cancer Mother     Cancer Mother     Liver cancer Sister     Breast cancer Sister     Cancer Sister     Glaucoma Sister     Thyroid disease Sister        History     Social History    Marital Status:      Spouse Name: N/A     Number of Children: N/A    Years of Education: N/A     Occupational History    Not on file.     Social History Main Topics    Smoking status: Never Smoker     Smokeless tobacco: Never Used    Alcohol Use: No    Drug Use: No    Sexually Active: Not on file     Other Topics Concern    Not on file     Social History Narrative       Review of Systems:  1. GENERAL: patient denies any fever, weight changes, generalized weakness, dizziness.  2. HEENT: patient denies headaches, visual disturbances, swallowing problems, sinus pain, nasal congestion  3. CARDIOVASCULAR: patient denies chest pain, palpitations.  4. PULMONARY: patient denies SOB, coughing, hemoptysis, wheezing.  5. GASTROINTESTINAL: patient denies abdominal pain, nausea, vomiting, diarrhea.  6. GENITOURINARY: patient denies dysuria, hematuria, hesitancy, frequency.  7. EXTREMITIES: patient denies LE  "edema, LE cramping.  8. DERMATOLOGY: patient denies rashes  9. NEURO: patient denies tremors, extremity weakness, extremity numbness/tingling.  10. MUSCULOSKELETAL: patient denies knee pain, no joint swelling.  11. HEMATOLOGY: patient denies rectal or gum bleeding.  12: PSYCH: patient denies anxiety, depression.      PHYSICAL EXAM:  /68   Pulse 84   Resp 20   Ht 5' 2" (1.575 m)   Wt 64.6 kg (142 lb 6.7 oz)   BMI 26.05 kg/m²     GENERAL: Elderly lady presents to clinic with non-labored breathing.  HEENT: PERRL, no nasal discharge, no icterus, no oral exudates, moist mucosal membranes.  NECK: no thyroid mass, no lymphadenopathy.  HEART: RRR S1/S2, no rubs, good peripheral pulses.  LUNGS: CTA bilaterally, no wheezing, breathing is nonlabored.  ABDOMEN: soft, nontender, not distended, bowel sounds are present, no abdominal hernia.  EXTREM: Trace LE edema.  SKIN: no visible rashes  NEURO: A & O x 3, no obvious focal signs.    LABORATORY RESULTS:  Lab Results   Component Value Date    CREATININE 5.1 (H) 05/24/2018    BUN 58 (H) 05/24/2018     05/24/2018    K 4.8 05/24/2018     05/24/2018    CO2 18 (L) 05/24/2018     Lab Results   Component Value Date    .0 (H) 05/02/2016    CALCIUM 9.2 05/24/2018    PHOS 4.2 12/20/2016       Lab Results   Component Value Date    ALBUMIN 3.1 (L) 05/24/2018     Lab Results   Component Value Date    WBC 3.39 (L) 06/08/2018    HGB 8.8 (L) 06/08/2018    HCT 26.9 (L) 06/08/2018    MCV 71 (L) 06/08/2018     06/08/2018       Protein Creatinine Ratios:    Creatinine, Random Ur   Date Value Ref Range Status   12/20/2016 52.0 15.0 - 325.0 mg/dL Final     Comment:     The random urine reference ranges provided were established   for 24 hour urine collections.  No reference ranges exist for  random urine specimens.  Correlate clinically.     06/24/2016 71.0 15.0 - 325.0 mg/dL Final     Comment:     The random urine reference ranges provided were established   for " 24 hour urine collections.  No reference ranges exist for  random urine specimens.  Correlate clinically.     05/02/2016 85.0 15.0 - 325.0 mg/dL Final     Comment:     The random urine reference ranges provided were established   for 24 hour urine collections.  No reference ranges exist for  random urine specimens.  Correlate clinically.       Protein, Urine   Date Value Ref Range Status   06/22/2010 38 (H) 0 - 10 mg/dl Final   06/01/2010 47 (H) 0 - 10 mg/dl Final     Protein, Urine Random   Date Value Ref Range Status   12/20/2016 86 (H) 0 - 15 mg/dL Final     Comment:     The random urine reference ranges provided were established   for 24 hour urine collections.  No reference ranges exist for  random urine specimens.  Correlate clinically.     06/24/2016 136 (H) 0 - 15 mg/dL Final     Comment:     The random urine reference ranges provided were established   for 24 hour urine collections.  No reference ranges exist for  random urine specimens.  Correlate clinically.     05/02/2016 319 (H) 0 - 15 mg/dL Final     Comment:     The random urine reference ranges provided were established   for 24 hour urine collections.  No reference ranges exist for  random urine specimens.  Correlate clinically.       Prot/Creat Ratio, Ur   Date Value Ref Range Status   12/20/2016 1.65 (H) 0.00 - 0.20 Final   06/24/2016 1.92 (H) 0.00 - 0.20 Final   05/02/2016 3.75 (H) 0.00 - 0.20 Final       Renal ultrasound (6/10/14): right kidney size 9 cm, left kidney size 10.8 cm, bilateral septated cysts.       ASSESSMENT AND PLAN:  86 y.o. female with history of HTN, gout, osteoarthritis, anemia, pulmon. HTN, CKD presents to the renal clinic for evaluation of renal insufficiency.    1. Renal insufficiency: Patient's renal function has worsened further with creatinine at 45.1. This is likely related to poor hydration and recent UTI. Patient was advised to hydrate with 60-65 ounces of water per day. Losartan has been discontinued because of  hyperkalemia. Protein creatinine ratio was 1.65. Patient is now on diltiazem. Serologies were negative. Possibility of renal biopsy has been discussed. Patient's renal function will be monitored closely and she will return to the clinic in 1 week for follow up. Patient was advised to avoid NSAID pain medications such as advil, aleve, motrin, ibuprofen, naprosyn, meloxicam, diclofenac, mobic.     2. Electrolytes: at goal.    3. Acid base status: mild acidosis, continue sodium bicarbonate.     4. Volume: Euvolemic. Patient was advised to hydrate with 60-65 ounces of water per day.     5. Hypertension: good BP control.     6. Medications: Reviewed.     7. Anemia: patient is being followed by Dr. Clark.     8. Septated cysts: Urology is following.     9. Pulmonary nodules: as per Pulmonology.

## 2018-06-14 ENCOUNTER — OFFICE VISIT (OUTPATIENT)
Dept: NEPHROLOGY | Facility: CLINIC | Age: 83
DRG: 683 | End: 2018-06-14
Payer: COMMERCIAL

## 2018-06-14 ENCOUNTER — HOSPITAL ENCOUNTER (INPATIENT)
Facility: HOSPITAL | Age: 83
LOS: 1 days | Discharge: HOME HEALTH | DRG: 683 | End: 2018-06-16
Attending: EMERGENCY MEDICINE | Admitting: INTERNAL MEDICINE
Payer: COMMERCIAL

## 2018-06-14 VITALS
WEIGHT: 144.38 LBS | HEIGHT: 62 IN | HEART RATE: 86 BPM | SYSTOLIC BLOOD PRESSURE: 144 MMHG | DIASTOLIC BLOOD PRESSURE: 82 MMHG | BODY MASS INDEX: 26.57 KG/M2

## 2018-06-14 DIAGNOSIS — E87.20 METABOLIC ACIDOSIS: ICD-10-CM

## 2018-06-14 DIAGNOSIS — N18.4 CKD (CHRONIC KIDNEY DISEASE) STAGE 4, GFR 15-29 ML/MIN: Primary | ICD-10-CM

## 2018-06-14 DIAGNOSIS — R80.9 PROTEINURIA, UNSPECIFIED TYPE: ICD-10-CM

## 2018-06-14 DIAGNOSIS — E87.5 HYPERKALEMIA: ICD-10-CM

## 2018-06-14 DIAGNOSIS — N17.9 AKI (ACUTE KIDNEY INJURY): ICD-10-CM

## 2018-06-14 DIAGNOSIS — N17.9 AKI (ACUTE KIDNEY INJURY): Primary | ICD-10-CM

## 2018-06-14 DIAGNOSIS — E87.20 ACIDOSIS, METABOLIC: ICD-10-CM

## 2018-06-14 DIAGNOSIS — E86.0 DEHYDRATION: ICD-10-CM

## 2018-06-14 LAB
ALBUMIN SERPL BCP-MCNC: 3.1 G/DL
ALBUMIN SERPL BCP-MCNC: 3.4 G/DL
ALP SERPL-CCNC: 67 U/L
ALP SERPL-CCNC: 70 U/L
ALT SERPL W/O P-5'-P-CCNC: 17 U/L
ALT SERPL W/O P-5'-P-CCNC: 17 U/L
ANION GAP SERPL CALC-SCNC: 16 MMOL/L
ANION GAP SERPL CALC-SCNC: 18 MMOL/L
ANISOCYTOSIS BLD QL SMEAR: SLIGHT
APTT BLDCRRT: <21 SEC
AST SERPL-CCNC: 37 U/L
AST SERPL-CCNC: 42 U/L
BASOPHILS # BLD AUTO: 0.01 K/UL
BASOPHILS NFR BLD: 0.1 %
BILIRUB SERPL-MCNC: 0.3 MG/DL
BILIRUB SERPL-MCNC: 0.3 MG/DL
BILIRUB UR QL STRIP: NEGATIVE
BUN SERPL-MCNC: 112 MG/DL
BUN SERPL-MCNC: 113 MG/DL
BURR CELLS BLD QL SMEAR: ABNORMAL
CALCIUM SERPL-MCNC: 8.8 MG/DL
CALCIUM SERPL-MCNC: 9.7 MG/DL
CHLORIDE SERPL-SCNC: 102 MMOL/L
CHLORIDE SERPL-SCNC: 108 MMOL/L
CLARITY UR: CLEAR
CO2 SERPL-SCNC: 14 MMOL/L
CO2 SERPL-SCNC: 14 MMOL/L
COLOR UR: YELLOW
CREAT SERPL-MCNC: 6.7 MG/DL
CREAT SERPL-MCNC: 7.4 MG/DL
DACRYOCYTES BLD QL SMEAR: ABNORMAL
DIFFERENTIAL METHOD: ABNORMAL
EOSINOPHIL # BLD AUTO: 0 K/UL
EOSINOPHIL NFR BLD: 0.4 %
ERYTHROCYTE [DISTWIDTH] IN BLOOD BY AUTOMATED COUNT: 19.2 %
EST. GFR  (AFRICAN AMERICAN): 5 ML/MIN/1.73 M^2
EST. GFR  (AFRICAN AMERICAN): 6 ML/MIN/1.73 M^2
EST. GFR  (NON AFRICAN AMERICAN): 5 ML/MIN/1.73 M^2
EST. GFR  (NON AFRICAN AMERICAN): 5 ML/MIN/1.73 M^2
GLUCOSE SERPL-MCNC: 85 MG/DL
GLUCOSE SERPL-MCNC: 87 MG/DL
GLUCOSE UR QL STRIP: NEGATIVE
HCT VFR BLD AUTO: 25.3 %
HGB BLD-MCNC: 8.1 G/DL
HGB UR QL STRIP: NEGATIVE
HYPOCHROMIA BLD QL SMEAR: ABNORMAL
INR PPP: 1
KETONES UR QL STRIP: NEGATIVE
LEUKOCYTE ESTERASE UR QL STRIP: ABNORMAL
LYMPHOCYTES # BLD AUTO: 0.9 K/UL
LYMPHOCYTES NFR BLD: 12.3 %
MCH RBC QN AUTO: 22.6 PG
MCHC RBC AUTO-ENTMCNC: 32 G/DL
MCV RBC AUTO: 71 FL
MICROSCOPIC COMMENT: ABNORMAL
MONOCYTES # BLD AUTO: 1.2 K/UL
MONOCYTES NFR BLD: 17.6 %
NEUTROPHILS # BLD AUTO: 4.9 K/UL
NEUTROPHILS NFR BLD: 69.9 %
NITRITE UR QL STRIP: NEGATIVE
OVALOCYTES BLD QL SMEAR: ABNORMAL
PH UR STRIP: 6 [PH] (ref 5–8)
PLATELET # BLD AUTO: 216 K/UL
PLATELET BLD QL SMEAR: ABNORMAL
PMV BLD AUTO: 9.2 FL
POIKILOCYTOSIS BLD QL SMEAR: SLIGHT
POLYCHROMASIA BLD QL SMEAR: ABNORMAL
POTASSIUM SERPL-SCNC: 4.5 MMOL/L
POTASSIUM SERPL-SCNC: 5.8 MMOL/L
PROT SERPL-MCNC: 8.2 G/DL
PROT SERPL-MCNC: 9.1 G/DL
PROT UR QL STRIP: ABNORMAL
PROTHROMBIN TIME: 10.3 SEC
RBC # BLD AUTO: 3.59 M/UL
SCHISTOCYTES BLD QL SMEAR: PRESENT
SODIUM SERPL-SCNC: 134 MMOL/L
SODIUM SERPL-SCNC: 138 MMOL/L
SP GR UR STRIP: <=1.005 (ref 1–1.03)
SPHEROCYTES BLD QL SMEAR: ABNORMAL
STOMATOCYTES BLD QL SMEAR: PRESENT
TARGETS BLD QL SMEAR: ABNORMAL
URN SPEC COLLECT METH UR: ABNORMAL
UROBILINOGEN UR STRIP-ACNC: NEGATIVE EU/DL
WBC # BLD AUTO: 6.99 K/UL
WBC #/AREA URNS HPF: 11 /HPF (ref 0–5)

## 2018-06-14 PROCEDURE — 85610 PROTHROMBIN TIME: CPT

## 2018-06-14 PROCEDURE — 87088 URINE BACTERIA CULTURE: CPT

## 2018-06-14 PROCEDURE — 99214 OFFICE O/P EST MOD 30 MIN: CPT | Mod: S$GLB,,, | Performed by: INTERNAL MEDICINE

## 2018-06-14 PROCEDURE — 25000003 PHARM REV CODE 250: Performed by: EMERGENCY MEDICINE

## 2018-06-14 PROCEDURE — 80053 COMPREHEN METABOLIC PANEL: CPT | Mod: 91

## 2018-06-14 PROCEDURE — 81000 URINALYSIS NONAUTO W/SCOPE: CPT

## 2018-06-14 PROCEDURE — 93010 ELECTROCARDIOGRAM REPORT: CPT | Mod: ,,, | Performed by: INTERNAL MEDICINE

## 2018-06-14 PROCEDURE — 99999 PR PBB SHADOW E&M-EST. PATIENT-LVL III: CPT | Mod: PBBFAC,,, | Performed by: INTERNAL MEDICINE

## 2018-06-14 PROCEDURE — 85025 COMPLETE CBC W/AUTO DIFF WBC: CPT

## 2018-06-14 PROCEDURE — 83735 ASSAY OF MAGNESIUM: CPT

## 2018-06-14 PROCEDURE — 99285 EMERGENCY DEPT VISIT HI MDM: CPT | Mod: 25

## 2018-06-14 PROCEDURE — 84100 ASSAY OF PHOSPHORUS: CPT

## 2018-06-14 PROCEDURE — 96372 THER/PROPH/DIAG INJ SC/IM: CPT

## 2018-06-14 PROCEDURE — 85730 THROMBOPLASTIN TIME PARTIAL: CPT

## 2018-06-14 PROCEDURE — 80053 COMPREHEN METABOLIC PANEL: CPT

## 2018-06-14 PROCEDURE — 87086 URINE CULTURE/COLONY COUNT: CPT

## 2018-06-14 PROCEDURE — G0378 HOSPITAL OBSERVATION PER HR: HCPCS

## 2018-06-14 PROCEDURE — 96374 THER/PROPH/DIAG INJ IV PUSH: CPT

## 2018-06-14 PROCEDURE — 87147 CULTURE TYPE IMMUNOLOGIC: CPT

## 2018-06-14 PROCEDURE — 96361 HYDRATE IV INFUSION ADD-ON: CPT

## 2018-06-14 RX ADMIN — SODIUM CHLORIDE 1000 ML: 0.9 INJECTION, SOLUTION INTRAVENOUS at 08:06

## 2018-06-14 RX ADMIN — SODIUM CHLORIDE 1000 ML: 0.9 INJECTION, SOLUTION INTRAVENOUS at 06:06

## 2018-06-14 NOTE — ED NOTES
Sent her by her PCP for elevated BUN and Creatine. Does not know the value of the results. Denies any trouble urinating.

## 2018-06-14 NOTE — PROGRESS NOTES
PROGRESS NOTE FOR ESTABLISHED PATIENT    PHYSICIAN REQUESTING THE CONSULT: Dr. Macedo    REASON FOR VISIT: Renal insufficiency    86 y.o. female with history of HTN, gout, osteoarthritis, anemia, asthma, pulmon. HTN, CKD presents to the renal clinic for evaluation of renal insufficiency.     Patient had recent hospital stay at [facility] on 5/6/18 when she presented with LE edema. She was discharged on Lasix 20 mg po daily and LE edema has now largely resolved.    Patient reports poor appetite. She has increased her fluid intake.           Past Medical History   Diagnosis Date    Hypertension     Gout, arthritis     Osteoarthritis     Anemia      sickle cell trait, alpha thalessemia    Helicobacter pylori gastritis     GERD (gastroesophageal reflux disease)     Asthma, chronic     Pulmonary HTN     Renal insufficiency     Cataract        Past Surgical History   Procedure Laterality Date    Thyroid surgery      Endometrial polyp         Allergies   Allergen Reactions    Ace Inhibitors Nausea And Vomiting    Amlodipine Edema    Metronidazole Nausea And Vomiting    Phenytoin Sodium Extended Nausea And Vomiting       Current Outpatient Prescriptions   Medication Sig Dispense Refill    alendronate (FOSAMAX) 70 MG tablet Take 1 tablet once weekly in the morning with a full glass of water on an empty stomach. Do not consume food or lie down for at least 30 minutes afterwards.  4 tablet  6    allopurinol (ZYLOPRIM) 100 MG tablet Take 1 tablet (100 mg total) by mouth once daily. 1 Tablet Oral Every day  30 tablet  6    B complex vitamins (B COMPLEX) TbSR Take by mouth. 1 Tablet Extended Release Oral Every day        CALCIUM CARBONATE/VITAMIN D3 (CALCIUM 500 + D, D3, ORAL) Take by mouth. 1 Tablet Oral Twice a day        cyclobenzaprine (FLEXERIL) 5 MG tablet Take 1 tablet (5 mg total) by mouth daily as needed for Muscle spasms. May cause sedation - use with caution  30 tablet  0    desonide (DESOWEN)  0.05 % cream     0    losartan (COZAAR) 100 MG tablet Take 1 tablet (100 mg total) by mouth once daily. 1 Tablet Oral daiy  30 tablet  6    mometasone (ASMANEX TWISTHALER) 220 mcg (60 doses) AePB Inhale into the lungs. 2 Aerosol Powdr Breath Activated Inhalation Every evening.  Always rinse mouth or brush teeth afteruse.Use every day as directed.        nebivolol (BYSTOLIC) 5 MG Tab Take 1 tablet (5 mg total) by mouth once daily.  30 tablet  3    nystatin-triamcinolone (MYCOLOG) ointment Apply topically 2 (two) times daily as needed.  15 g  0    omeprazole (PRILOSEC) 20 MG capsule Take 1 capsule (20 mg total) by mouth once daily.  30 capsule  6     No current facility-administered medications for this visit.       Family History   Problem Relation Age of Onset    Liver cancer Mother     Cancer Mother     Liver cancer Sister     Breast cancer Sister     Cancer Sister     Glaucoma Sister     Thyroid disease Sister        History     Social History    Marital Status:      Spouse Name: N/A     Number of Children: N/A    Years of Education: N/A     Occupational History    Not on file.     Social History Main Topics    Smoking status: Never Smoker     Smokeless tobacco: Never Used    Alcohol Use: No    Drug Use: No    Sexually Active: Not on file     Other Topics Concern    Not on file     Social History Narrative       Review of Systems:  1. GENERAL: patient denies any fever, weight changes, generalized weakness, dizziness.  2. HEENT: patient denies headaches, visual disturbances, swallowing problems, sinus pain, nasal congestion  3. CARDIOVASCULAR: patient denies chest pain, palpitations.  4. PULMONARY: patient denies SOB, coughing, hemoptysis, wheezing.  5. GASTROINTESTINAL: patient denies abdominal pain, nausea, vomiting, diarrhea.  6. GENITOURINARY: patient denies dysuria, hematuria, hesitancy, frequency.  7. EXTREMITIES: patient denies LE edema, LE cramping.  8. DERMATOLOGY: patient  "denies rashes  9. NEURO: patient denies tremors, extremity weakness, extremity numbness/tingling.  10. MUSCULOSKELETAL: patient denies knee pain, no joint swelling.  11. HEMATOLOGY: patient denies rectal or gum bleeding.  12: PSYCH: patient denies anxiety, depression.      PHYSICAL EXAM:  BP (!) 144/82   Pulse 86   Ht 5' 2" (1.575 m)   Wt 65.5 kg (144 lb 6.4 oz)   BMI 26.41 kg/m²     GENERAL: Elderly lady presents to clinic with non-labored breathing.  HEENT: PERRL, no nasal discharge, no icterus, no oral exudates, moist mucosal membranes.  NECK: no thyroid mass, no lymphadenopathy.  HEART: RRR S1/S2, no rubs, good peripheral pulses.  LUNGS: CTA bilaterally, no wheezing, breathing is nonlabored.  ABDOMEN: soft, nontender, not distended, bowel sounds are present, no abdominal hernia.  EXTREM: Trace LE edema.  SKIN: no visible rashes  NEURO: A & O x 3, no obvious focal signs.    LABORATORY RESULTS:  Lab Results   Component Value Date    CREATININE 8.5 (H) 06/11/2018     (H) 06/11/2018     (L) 06/11/2018    K 4.6 06/11/2018     06/11/2018    CO2 16 (L) 06/11/2018     Lab Results   Component Value Date    .0 (H) 05/02/2016    CALCIUM 8.9 06/11/2018    PHOS 4.6 (H) 06/11/2018       Lab Results   Component Value Date    ALBUMIN 3.0 (L) 06/11/2018     Lab Results   Component Value Date    WBC 3.39 (L) 06/08/2018    HGB 8.8 (L) 06/08/2018    HCT 26.9 (L) 06/08/2018    MCV 71 (L) 06/08/2018     06/08/2018       Protein Creatinine Ratios:    Creatinine, Random Ur   Date Value Ref Range Status   12/20/2016 52.0 15.0 - 325.0 mg/dL Final     Comment:     The random urine reference ranges provided were established   for 24 hour urine collections.  No reference ranges exist for  random urine specimens.  Correlate clinically.     06/24/2016 71.0 15.0 - 325.0 mg/dL Final     Comment:     The random urine reference ranges provided were established   for 24 hour urine collections.  No reference " ranges exist for  random urine specimens.  Correlate clinically.     05/02/2016 85.0 15.0 - 325.0 mg/dL Final     Comment:     The random urine reference ranges provided were established   for 24 hour urine collections.  No reference ranges exist for  random urine specimens.  Correlate clinically.       Protein, Urine   Date Value Ref Range Status   06/22/2010 38 (H) 0 - 10 mg/dl Final   06/01/2010 47 (H) 0 - 10 mg/dl Final     Protein, Urine Random   Date Value Ref Range Status   12/20/2016 86 (H) 0 - 15 mg/dL Final     Comment:     The random urine reference ranges provided were established   for 24 hour urine collections.  No reference ranges exist for  random urine specimens.  Correlate clinically.     06/24/2016 136 (H) 0 - 15 mg/dL Final     Comment:     The random urine reference ranges provided were established   for 24 hour urine collections.  No reference ranges exist for  random urine specimens.  Correlate clinically.     05/02/2016 319 (H) 0 - 15 mg/dL Final     Comment:     The random urine reference ranges provided were established   for 24 hour urine collections.  No reference ranges exist for  random urine specimens.  Correlate clinically.       Prot/Creat Ratio, Ur   Date Value Ref Range Status   12/20/2016 1.65 (H) 0.00 - 0.20 Final   06/24/2016 1.92 (H) 0.00 - 0.20 Final   05/02/2016 3.75 (H) 0.00 - 0.20 Final       Renal ultrasound (6/10/14): right kidney size 9 cm, left kidney size 10.8 cm, bilateral septated cysts.       ASSESSMENT AND PLAN:  86 y.o. female with history of HTN, gout, osteoarthritis, anemia, pulmon. HTN, CKD presents to the renal clinic for evaluation of renal insufficiency.    1. Renal insufficiency: Patient's renal function has worsened further with creatinine at 8.5. This is possibly related to poor hydration. Patient was advised to hydrate with 60-65 ounces of water per day. Losartan has been discontinued because of hyperkalemia. Protein creatinine ratio was 1.65. Patient is  now on diltiazem. Serologies were negative. [place] [facility] [place]. Consider biopsy if no improvement. She will return to the clinic in 2 months for follow up. Patient was advised to avoid NSAID pain medications such as advil, aleve, motrin, ibuprofen, naprosyn, meloxicam, diclofenac, mobic.     2. Electrolytes: at goal.    3. Acid base status: acidosis, continue sodium bicarbonate.     4. Volume: Euvolemic. Patient was advised to hydrate with 60-65 ounces of water per day.     5. Hypertension: good BP control.     6. Medications: Reviewed.     7. Anemia: patient is being followed by Dr. Clark.     8. Septated cysts: Urology is following.     9. Pulmonary nodules: as per Pulmonology.

## 2018-06-14 NOTE — ED PROVIDER NOTES
SCRIBE #1 NOTE: I, Elizabeth Dave, am scribing for, and in the presence of, Shadi Queen MD. I have scribed the HPI, ROS, and PEx.     SCRIBE #2 NOTE: I, Marty Owen, am scribing for, and in the presence of,  Ryan Soto MD. I have scribed the remaining portions of the note not scribed by Scribe #1.     History      Chief Complaint   Patient presents with    Abnormal Lab     sent from Dr. [facility] for dehydration       Review of patient's allergies indicates:   Allergen Reactions    Allopurinol analogues Rash and Other (See Comments)     Sores in mouth  Sores in mouth  Sores in mouth    Diflucan [fluconazole] Swelling    Amlodipine Edema and Swelling    Ace inhibitors Nausea And Vomiting    Metronidazole Nausea And Vomiting    Phenytoin sodium extended Nausea And Vomiting        HPI   HPI    6/14/2018, 5:13 PM   History obtained from the patient and relative      History of Present Illness: Gloria Sanz is a 86 y.o. female patient with a PMHx of asthma and HTN who presents to the Emergency Department for dehydration after being seen today at Dr. Munguia's office. Dr. Munguia expressed that pt has abnormal kidney function and should be admitted. No associated sxs included. Patient denies any nausea, vomiting, fatigue, myalgias, headache, and all other sxs at this time. No prior Tx included. No further complaints or concerns at this time.         Arrival mode: Personal vehicle    PCP: Derrick Desouza MD       Past Medical History:  Past Medical History:   Diagnosis Date    Anemia     sickle cell trait, alpha thalessemia    Anemia of chronic renal failure 5/12/2014    Asthma, chronic     Cataract     GERD (gastroesophageal reflux disease)     Gout, arthritis     Helicobacter pylori gastritis     Hypertension     Osteoarthritis     Pulmonary HTN     Renal insufficiency        Past Surgical History:  Past Surgical History:   Procedure Laterality Date    CHOLECYSTECTOMY       endometrial polyp      FETAL BLOOD TRANSFUSION  12/11/2015    2 pints    THYROID SURGERY           Family History:  Family History   Problem Relation Age of Onset    Liver cancer Mother     Cancer Mother     Liver cancer Sister     Breast cancer Sister     Cancer Sister     Glaucoma Sister     Thyroid disease Sister     Melanoma Neg Hx     Eczema Neg Hx     Lupus Neg Hx     Psoriasis Neg Hx        Social History:  Social History     Social History Main Topics    Smoking status: Never Smoker    Smokeless tobacco: Never Used    Alcohol use No    Drug use: No    Sexual activity: Unknown       ROS   Review of Systems   Constitutional: Negative for appetite change, chills, diaphoresis, fatigue and fever.   HENT: Negative for congestion and sore throat.    Respiratory: Negative for cough and shortness of breath.    Cardiovascular: Negative for chest pain.   Gastrointestinal: Negative for abdominal pain, diarrhea, nausea and vomiting.   Genitourinary: Negative for dysuria.   Musculoskeletal: Negative for back pain and myalgias.   Skin: Negative for rash.   Neurological: Negative for weakness, light-headedness and headaches.   Hematological: Does not bruise/bleed easily.   All other systems reviewed and are negative.    Physical Exam      Initial Vitals [06/14/18 1658]   BP Pulse Resp Temp SpO2   (!) 173/95 84 18 97.9 °F (36.6 °C) 99 %      MAP       --          Physical Exam  Nursing Notes and Vital Signs Reviewed.  Constitutional: Patient is in no acute distress. Well-developed and well-nourished.  Head: Atraumatic. Normocephalic.  Eyes: PERRL. EOM intact. Conjunctivae are not pale. No scleral icterus.  ENT: Mucous membranes are dry. Oropharynx is clear and symmetric.    Neck: Supple. Full ROM. No lymphadenopathy.  Cardiovascular: Regular rate. Regular rhythm. No murmurs, rubs, or gallops. Distal pulses are 2+ and symmetric.  Pulmonary/Chest: No respiratory distress. Clear to auscultation bilaterally. No  wheezing or rales.  Abdominal: Soft and non-distended.  There is no tenderness.   Musculoskeletal: Moves all extremities. No obvious deformities. No edema. No calf tenderness.  Skin: Warm and dry.  Neurological:  Alert, awake, and appropriate.  Normal speech.  No acute focal neurological deficits are appreciated.  Psychiatric: Normal affect. Good eye contact. Appropriate in content.    ED Course    Procedures  ED Vital Signs:  Vitals:    06/14/18 2201 06/14/18 2301 06/14/18 2339 06/15/18 0136   BP: (!) 154/79 (!) 174/89 (!) 193/103 (!) 184/105   Pulse: 94 94 101 94   Resp:       Temp:       TempSrc:       SpO2: 99% 99% 99% 100%   Weight:       Height:        06/15/18 0201 06/15/18 0230 06/15/18 0300 06/15/18 0750   BP: (!) 160/83 (!) 152/88 (!) 167/85 (!) 172/88   Pulse: 91 87 87 93   Resp:    18   Temp: 98.1 °F (36.7 °C)      TempSrc: Oral      SpO2: 99% 100% 100% 99%   Weight:       Height:        06/15/18 1002 06/15/18 1143 06/15/18 1153 06/15/18 1520   BP: (!) 162/88  (!) 158/89    Pulse: 89 90 84 97   Resp: 18  16    Temp: 98.1 °F (36.7 °C)  97.5 °F (36.4 °C)    TempSrc: Oral  Oral    SpO2: 99%  98%    Weight:       Height:        06/15/18 1744 06/15/18 1938 06/16/18 0006   BP:  132/63 137/83   Pulse: 97 82 85   Resp:  16 19   Temp:  97.8 °F (36.6 °C) 98.7 °F (37.1 °C)   TempSrc:  Oral Oral   SpO2:  99% 97%   Weight:      Height:          Abnormal Lab Results:  Labs Reviewed   CBC W/ AUTO DIFFERENTIAL - Abnormal; Notable for the following:        Result Value    RBC 3.59 (*)     Hemoglobin 8.1 (*)     Hematocrit 25.3 (*)     MCV 71 (*)     MCH 22.6 (*)     RDW 19.2 (*)     Lymph # 0.9 (*)     Mono # 1.2 (*)     Lymph% 12.3 (*)     Mono% 17.6 (*)     Platelet Estimate Clumped (*)     All other components within normal limits   COMPREHENSIVE METABOLIC PANEL - Abnormal; Notable for the following:     Sodium 134 (*)     Potassium 5.8 (*)     CO2 14 (*)     BUN, Bld 113 (*)     Creatinine 7.4 (*)     Total Protein  9.1 (*)     Albumin 3.4 (*)     AST 42 (*)     Anion Gap 18 (*)     eGFR if  5 (*)     eGFR if non  5 (*)     All other components within normal limits   URINALYSIS - Abnormal; Notable for the following:     Specific Gravity, UA <=1.005 (*)     Protein, UA Trace (*)     Leukocytes, UA 3+ (*)     All other components within normal limits   URINALYSIS MICROSCOPIC - Abnormal; Notable for the following:     WBC, UA 11 (*)     All other components within normal limits   COMPREHENSIVE METABOLIC PANEL - Abnormal; Notable for the following:     CO2 14 (*)     BUN, Bld 112 (*)     Creatinine 6.7 (*)     Albumin 3.1 (*)     eGFR if  6 (*)     eGFR if non  5 (*)     All other components within normal limits   TSH - Abnormal; Notable for the following:     TSH 0.011 (*)     All other components within normal limits    Narrative:     Fasting   T4, FREE - Abnormal; Notable for the following:     Free T4 0.64 (*)     All other components within normal limits    Narrative:     Fasting   CBC W/ AUTO DIFFERENTIAL - Abnormal; Notable for the following:     RBC 3.69 (*)     Hemoglobin 8.4 (*)     Hematocrit 26.1 (*)     MCV 71 (*)     MCH 22.8 (*)     RDW 19.3 (*)     MPV 8.9 (*)     Lymph # 0.9 (*)     Lymph% 16.1 (*)     Mono% 15.5 (*)     All other components within normal limits   COMPREHENSIVE METABOLIC PANEL - Abnormal; Notable for the following:     CO2 15 (*)     BUN, Bld 110 (*)     Creatinine 6.6 (*)     Total Protein 8.6 (*)     Albumin 3.2 (*)     eGFR if  6 (*)     eGFR if non  5 (*)     All other components within normal limits   APTT   PROTIME-INR   MAGNESIUM   PHOSPHORUS   MAGNESIUM   PHOSPHORUS        All Lab Results:  Results for orders placed or performed during the hospital encounter of 06/14/18   CBC auto differential   Result Value Ref Range    WBC 6.99 3.90 - 12.70 K/uL    RBC 3.59 (L) 4.00 - 5.40 M/uL    Hemoglobin  8.1 (L) 12.0 - 16.0 g/dL    Hematocrit 25.3 (L) 37.0 - 48.5 %    MCV 71 (L) 82 - 98 fL    MCH 22.6 (L) 27.0 - 31.0 pg    MCHC 32.0 32.0 - 36.0 g/dL    RDW 19.2 (H) 11.5 - 14.5 %    Platelets 216 150 - 350 K/uL    MPV 9.2 9.2 - 12.9 fL    Gran # (ANC) 4.9 1.8 - 7.7 K/uL    Lymph # 0.9 (L) 1.0 - 4.8 K/uL    Mono # 1.2 (H) 0.3 - 1.0 K/uL    Eos # 0.0 0.0 - 0.5 K/uL    Baso # 0.01 0.00 - 0.20 K/uL    Gran% 69.9 38.0 - 73.0 %    Lymph% 12.3 (L) 18.0 - 48.0 %    Mono% 17.6 (H) 4.0 - 15.0 %    Eosinophil% 0.4 0.0 - 8.0 %    Basophil% 0.1 0.0 - 1.9 %    Platelet Estimate Clumped (A)     Aniso Slight     Poik Slight     Poly Occasional     Hypo Moderate     Ovalocytes Occasional     Target Cells Occasional     Tear Drop Cells Occasional     Angelica Cells Occasional     Stomatocytes Present     Spherocytes Occasional     Schistocytes Present     Differential Method Automated    Comprehensive metabolic panel   Result Value Ref Range    Sodium 134 (L) 136 - 145 mmol/L    Potassium 5.8 (H) 3.5 - 5.1 mmol/L    Chloride 102 95 - 110 mmol/L    CO2 14 (L) 23 - 29 mmol/L    Glucose 87 70 - 110 mg/dL    BUN, Bld 113 (H) 8 - 23 mg/dL    Creatinine 7.4 (H) 0.5 - 1.4 mg/dL    Calcium 9.7 8.7 - 10.5 mg/dL    Total Protein 9.1 (H) 6.0 - 8.4 g/dL    Albumin 3.4 (L) 3.5 - 5.2 g/dL    Total Bilirubin 0.3 0.1 - 1.0 mg/dL    Alkaline Phosphatase 70 55 - 135 U/L    AST 42 (H) 10 - 40 U/L    ALT 17 10 - 44 U/L    Anion Gap 18 (H) 8 - 16 mmol/L    eGFR if African American 5 (A) >60 mL/min/1.73 m^2    eGFR if non African American 5 (A) >60 mL/min/1.73 m^2   APTT   Result Value Ref Range    aPTT <21.0 21.0 - 32.0 sec   Protime-INR   Result Value Ref Range    Prothrombin Time 10.3 9.0 - 12.5 sec    INR 1.0 0.8 - 1.2   Urinalysis   Result Value Ref Range    Specimen UA Urine, Clean Catch     Color, UA Yellow Yellow, Straw, Fabiana    Appearance, UA Clear Clear    pH, UA 6.0 5.0 - 8.0    Specific Gravity, UA <=1.005 (A) 1.005 - 1.030    Protein, UA Trace  (A) Negative    Glucose, UA Negative Negative    Ketones, UA Negative Negative    Bilirubin (UA) Negative Negative    Occult Blood UA Negative Negative    Nitrite, UA Negative Negative    Urobilinogen, UA Negative <2.0 EU/dL    Leukocytes, UA 3+ (A) Negative   Urinalysis Microscopic   Result Value Ref Range    WBC, UA 11 (H) 0 - 5 /hpf    Microscopic Comment SEE COMMENT    Comprehensive metabolic panel   Result Value Ref Range    Sodium 138 136 - 145 mmol/L    Potassium 4.5 3.5 - 5.1 mmol/L    Chloride 108 95 - 110 mmol/L    CO2 14 (L) 23 - 29 mmol/L    Glucose 85 70 - 110 mg/dL    BUN, Bld 112 (H) 8 - 23 mg/dL    Creatinine 6.7 (H) 0.5 - 1.4 mg/dL    Calcium 8.8 8.7 - 10.5 mg/dL    Total Protein 8.2 6.0 - 8.4 g/dL    Albumin 3.1 (L) 3.5 - 5.2 g/dL    Total Bilirubin 0.3 0.1 - 1.0 mg/dL    Alkaline Phosphatase 67 55 - 135 U/L    AST 37 10 - 40 U/L    ALT 17 10 - 44 U/L    Anion Gap 16 8 - 16 mmol/L    eGFR if African American 6 (A) >60 mL/min/1.73 m^2    eGFR if non African American 5 (A) >60 mL/min/1.73 m^2   Hemoglobin A1c   Result Value Ref Range    Hemoglobin A1C 5.0 4.0 - 5.6 %    Estimated Avg Glucose 97 68 - 131 mg/dL   Lipid panel   Result Value Ref Range    Cholesterol 122 120 - 199 mg/dL    Triglycerides 43 30 - 150 mg/dL    HDL 53 40 - 75 mg/dL    LDL Cholesterol 60.4 (L) 63.0 - 159.0 mg/dL    HDL/Chol Ratio 43.4 20.0 - 50.0 %    Total Cholesterol/HDL Ratio 2.3 2.0 - 5.0    Non-HDL Cholesterol 69 mg/dL   TSH   Result Value Ref Range    TSH 0.011 (L) 0.400 - 4.000 uIU/mL   Magnesium   Result Value Ref Range    Magnesium 1.6 1.6 - 2.6 mg/dL   Phosphorus   Result Value Ref Range    Phosphorus 3.9 2.7 - 4.5 mg/dL   T4, free   Result Value Ref Range    Free T4 0.64 (L) 0.71 - 1.51 ng/dL   CBC auto differential   Result Value Ref Range    WBC 5.66 3.90 - 12.70 K/uL    RBC 3.69 (L) 4.00 - 5.40 M/uL    Hemoglobin 8.4 (L) 12.0 - 16.0 g/dL    Hematocrit 26.1 (L) 37.0 - 48.5 %    MCV 71 (L) 82 - 98 fL    MCH 22.8  (L) 27.0 - 31.0 pg    MCHC 32.2 32.0 - 36.0 g/dL    RDW 19.3 (H) 11.5 - 14.5 %    Platelets 290 150 - 350 K/uL    MPV 8.9 (L) 9.2 - 12.9 fL    Gran # (ANC) 3.8 1.8 - 7.7 K/uL    Lymph # 0.9 (L) 1.0 - 4.8 K/uL    Mono # 0.9 0.3 - 1.0 K/uL    Eos # 0.1 0.0 - 0.5 K/uL    Baso # 0.01 0.00 - 0.20 K/uL    Gran% 66.6 38.0 - 73.0 %    Lymph% 16.1 (L) 18.0 - 48.0 %    Mono% 15.5 (H) 4.0 - 15.0 %    Eosinophil% 1.6 0.0 - 8.0 %    Basophil% 0.2 0.0 - 1.9 %    Differential Method Automated    Comprehensive metabolic panel   Result Value Ref Range    Sodium 141 136 - 145 mmol/L    Potassium 4.6 3.5 - 5.1 mmol/L    Chloride 110 95 - 110 mmol/L    CO2 15 (L) 23 - 29 mmol/L    Glucose 80 70 - 110 mg/dL    BUN, Bld 110 (H) 8 - 23 mg/dL    Creatinine 6.6 (H) 0.5 - 1.4 mg/dL    Calcium 9.3 8.7 - 10.5 mg/dL    Total Protein 8.6 (H) 6.0 - 8.4 g/dL    Albumin 3.2 (L) 3.5 - 5.2 g/dL    Total Bilirubin 0.3 0.1 - 1.0 mg/dL    Alkaline Phosphatase 73 55 - 135 U/L    AST 39 10 - 40 U/L    ALT 19 10 - 44 U/L    Anion Gap 16 8 - 16 mmol/L    eGFR if African American 6 (A) >60 mL/min/1.73 m^2    eGFR if non African American 5 (A) >60 mL/min/1.73 m^2   Basic metabolic panel   Result Value Ref Range    Sodium 142 136 - 145 mmol/L    Potassium 4.6 3.5 - 5.1 mmol/L    Chloride 110 95 - 110 mmol/L    CO2 17 (L) 23 - 29 mmol/L    Glucose 125 (H) 70 - 110 mg/dL    BUN, Bld 102 (H) 8 - 23 mg/dL    Creatinine 6.3 (H) 0.5 - 1.4 mg/dL    Calcium 9.4 8.7 - 10.5 mg/dL    Anion Gap 15 8 - 16 mmol/L    eGFR if African American 6 (A) >60 mL/min/1.73 m^2    eGFR if non African American 6 (A) >60 mL/min/1.73 m^2     The EKG was ordered, reviewed, and independently interpreted by the ED provider.  Interpretation time: 23:30  Rate: 88 BPM  Rhythm: sinus rhythm with 1st degree AV block  Interpretation: Possible Left atrial enlargement. Left axis deviation. Left ventricular hypertrophy. Abnormal ECG. No STEMI.            The Emergency Provider reviewed the vital  signs and test results, which are outlined above.    ED Discussion     8:00 PM: Dr. Queen transfers care of pt to Dr. Soto.     11:25 PM: Dr. Soto discussed the pt's case with Dr. Fofana ([facility]) who recommends admit to hospital and giving Pt half normal saline with 75 yusuf-equivalents of sodium bicarbonate at 75 cc's an hour.    11:29 PM: Discussed case with Chacorta ([facility]). Dr. Vergara agrees with current care and management of pt and accepts admission.   Admitting Service: [facility]   Admitting Physician: Dr. Vergara  Admit to: Obs Tele    11:29 PM: Re-evaluated pt. I have discussed test results, shared treatment plan, and the need for admission with patient and family at bedside. Pt and family express understanding at this time and agree with all information. All questions answered. Pt and family have no further questions or concerns at this time. Pt is ready for admit.    ED Medication(s):  Medications   sodium chloride 0.45% 1,000 mL with sodium bicarbonate 1 mEq/mL (8.4 %) 75 mEq infusion ( Intravenous New Bag 6/15/18 0041)   cloNIDine tablet 0.3 mg (0.3 mg Oral Given 6/15/18 2142)   hydrALAZINE tablet 50 mg (50 mg Oral Given 6/15/18 2142)   diltiaZEM 24 hr capsule 180 mg (180 mg Oral Given 6/15/18 0916)   sodium chloride 0.9% flush 5 mL (not administered)   glucose chewable tablet 16 g (not administered)   glucose chewable tablet 24 g (not administered)   dextrose 50% injection 12.5 g (not administered)   dextrose 50% injection 25 g (not administered)   glucagon (human recombinant) injection 1 mg (not administered)   heparin (porcine) injection 5,000 Units (5,000 Units Subcutaneous Given 6/15/18 2141)   pantoprazole EC tablet 40 mg (40 mg Oral Given 6/15/18 0916)   sodium bicarbonate tablet 650 mg (650 mg Oral Given 6/15/18 2142)   sodium chloride 0.9% bolus 1,000 mL (0 mLs Intravenous Stopped 6/14/18 2003)   sodium chloride 0.9% bolus 1,000 mL (0 mLs Intravenous  Stopped 6/14/18 3215)       Current Discharge Medication List                Medical Decision Making    Medical Decision Making:   Clinical Tests:   Lab Tests: Ordered and Reviewed  Medical Tests: Reviewed and Ordered           Scribe Attestation:   Scribe #1: I performed the above scribed service and the documentation accurately describes the services I performed. I attest to the accuracy of the note.    Attending:   Physician Attestation Statement for Scribe #1: I, Shadi Queen MD, personally performed the services described in this documentation, as scribed by Elizabeth Dave, in my presence, and it is both accurate and complete.       Scribe Attestation:   Scribe #2: I performed the above scribed service and the documentation accurately describes the services I performed. I attest to the accuracy of the note.    Attending Attestation:           Physician Attestation for Scribe:    Physician Attestation Statement for Scribe #2: I, Ryan Soto MD, reviewed documentation, as scribed by Marty Owen in my presence, and it is both accurate and complete. I also acknowledge and confirm the content of the note done by Scribe #1.          Clinical Impression       ICD-10-CM ICD-9-CM   1. SEGUNDO (acute kidney injury) N17.9 584.9   2. Hyperkalemia E87.5 276.7   3. Dehydration E86.0 276.51   4. Metabolic acidosis E87.2 276.2       Disposition:   Disposition: Placed in Observation  Condition: Rachel Soto Jr., MD  06/16/18 0413

## 2018-06-15 PROBLEM — N18.4 ACUTE RENAL FAILURE SUPERIMPOSED ON STAGE 4 CHRONIC KIDNEY DISEASE: Status: ACTIVE | Noted: 2018-06-14

## 2018-06-15 PROBLEM — N18.5 ACUTE RENAL FAILURE SUPERIMPOSED ON STAGE 5 CHRONIC KIDNEY DISEASE, NOT ON CHRONIC DIALYSIS: Status: ACTIVE | Noted: 2018-06-14

## 2018-06-15 LAB
ALBUMIN SERPL BCP-MCNC: 3.2 G/DL
ALP SERPL-CCNC: 73 U/L
ALT SERPL W/O P-5'-P-CCNC: 19 U/L
ANION GAP SERPL CALC-SCNC: 15 MMOL/L
ANION GAP SERPL CALC-SCNC: 16 MMOL/L
AST SERPL-CCNC: 39 U/L
BASOPHILS # BLD AUTO: 0.01 K/UL
BASOPHILS NFR BLD: 0.2 %
BILIRUB SERPL-MCNC: 0.3 MG/DL
BUN SERPL-MCNC: 102 MG/DL
BUN SERPL-MCNC: 110 MG/DL
CALCIUM SERPL-MCNC: 9.3 MG/DL
CALCIUM SERPL-MCNC: 9.4 MG/DL
CHLORIDE SERPL-SCNC: 110 MMOL/L
CHLORIDE SERPL-SCNC: 110 MMOL/L
CHOLEST SERPL-MCNC: 122 MG/DL
CHOLEST/HDLC SERPL: 2.3 {RATIO}
CO2 SERPL-SCNC: 15 MMOL/L
CO2 SERPL-SCNC: 17 MMOL/L
CREAT SERPL-MCNC: 6.3 MG/DL
CREAT SERPL-MCNC: 6.6 MG/DL
DIFFERENTIAL METHOD: ABNORMAL
EOSINOPHIL # BLD AUTO: 0.1 K/UL
EOSINOPHIL NFR BLD: 1.6 %
ERYTHROCYTE [DISTWIDTH] IN BLOOD BY AUTOMATED COUNT: 19.3 %
EST. GFR  (AFRICAN AMERICAN): 6 ML/MIN/1.73 M^2
EST. GFR  (AFRICAN AMERICAN): 6 ML/MIN/1.73 M^2
EST. GFR  (NON AFRICAN AMERICAN): 5 ML/MIN/1.73 M^2
EST. GFR  (NON AFRICAN AMERICAN): 6 ML/MIN/1.73 M^2
ESTIMATED AVG GLUCOSE: 97 MG/DL
GLUCOSE SERPL-MCNC: 125 MG/DL
GLUCOSE SERPL-MCNC: 80 MG/DL
HBA1C MFR BLD HPLC: 5 %
HCT VFR BLD AUTO: 26.1 %
HDLC SERPL-MCNC: 53 MG/DL
HDLC SERPL: 43.4 %
HGB BLD-MCNC: 8.4 G/DL
LDLC SERPL CALC-MCNC: 60.4 MG/DL
LYMPHOCYTES # BLD AUTO: 0.9 K/UL
LYMPHOCYTES NFR BLD: 16.1 %
MAGNESIUM SERPL-MCNC: 1.6 MG/DL
MCH RBC QN AUTO: 22.8 PG
MCHC RBC AUTO-ENTMCNC: 32.2 G/DL
MCV RBC AUTO: 71 FL
MONOCYTES # BLD AUTO: 0.9 K/UL
MONOCYTES NFR BLD: 15.5 %
NEUTROPHILS # BLD AUTO: 3.8 K/UL
NEUTROPHILS NFR BLD: 66.6 %
NONHDLC SERPL-MCNC: 69 MG/DL
PHOSPHATE SERPL-MCNC: 3.9 MG/DL
PLATELET # BLD AUTO: 290 K/UL
PMV BLD AUTO: 8.9 FL
POTASSIUM SERPL-SCNC: 4.6 MMOL/L
POTASSIUM SERPL-SCNC: 4.6 MMOL/L
PROT SERPL-MCNC: 8.6 G/DL
RBC # BLD AUTO: 3.69 M/UL
SODIUM SERPL-SCNC: 141 MMOL/L
SODIUM SERPL-SCNC: 142 MMOL/L
T4 FREE SERPL-MCNC: 0.64 NG/DL
TRIGL SERPL-MCNC: 43 MG/DL
TSH SERPL DL<=0.005 MIU/L-ACNC: 0.01 UIU/ML
WBC # BLD AUTO: 5.66 K/UL

## 2018-06-15 PROCEDURE — 84439 ASSAY OF FREE THYROXINE: CPT

## 2018-06-15 PROCEDURE — 25000003 PHARM REV CODE 250: Performed by: EMERGENCY MEDICINE

## 2018-06-15 PROCEDURE — 25000003 PHARM REV CODE 250: Performed by: NURSE PRACTITIONER

## 2018-06-15 PROCEDURE — 80053 COMPREHEN METABOLIC PANEL: CPT

## 2018-06-15 PROCEDURE — 83036 HEMOGLOBIN GLYCOSYLATED A1C: CPT

## 2018-06-15 PROCEDURE — 11000001 HC ACUTE MED/SURG PRIVATE ROOM

## 2018-06-15 PROCEDURE — 84443 ASSAY THYROID STIM HORMONE: CPT

## 2018-06-15 PROCEDURE — 36415 COLL VENOUS BLD VENIPUNCTURE: CPT

## 2018-06-15 PROCEDURE — 63600175 PHARM REV CODE 636 W HCPCS: Performed by: NURSE PRACTITIONER

## 2018-06-15 PROCEDURE — 85025 COMPLETE CBC W/AUTO DIFF WBC: CPT

## 2018-06-15 PROCEDURE — 80061 LIPID PANEL: CPT

## 2018-06-15 PROCEDURE — 80048 BASIC METABOLIC PNL TOTAL CA: CPT

## 2018-06-15 PROCEDURE — 99223 1ST HOSP IP/OBS HIGH 75: CPT | Mod: ,,, | Performed by: INTERNAL MEDICINE

## 2018-06-15 RX ORDER — PANTOPRAZOLE SODIUM 40 MG/1
40 TABLET, DELAYED RELEASE ORAL DAILY
Status: DISCONTINUED | OUTPATIENT
Start: 2018-06-15 | End: 2018-06-16 | Stop reason: HOSPADM

## 2018-06-15 RX ORDER — HEPARIN SODIUM 5000 [USP'U]/ML
5000 INJECTION, SOLUTION INTRAVENOUS; SUBCUTANEOUS EVERY 8 HOURS
Status: DISCONTINUED | OUTPATIENT
Start: 2018-06-15 | End: 2018-06-16 | Stop reason: HOSPADM

## 2018-06-15 RX ORDER — CLONIDINE HYDROCHLORIDE 0.3 MG/1
0.3 TABLET ORAL 2 TIMES DAILY
Status: DISCONTINUED | OUTPATIENT
Start: 2018-06-15 | End: 2018-06-16 | Stop reason: HOSPADM

## 2018-06-15 RX ORDER — IBUPROFEN 200 MG
16 TABLET ORAL
Status: DISCONTINUED | OUTPATIENT
Start: 2018-06-15 | End: 2018-06-16 | Stop reason: HOSPADM

## 2018-06-15 RX ORDER — SODIUM CHLORIDE 0.9 % (FLUSH) 0.9 %
5 SYRINGE (ML) INJECTION
Status: DISCONTINUED | OUTPATIENT
Start: 2018-06-15 | End: 2018-06-16 | Stop reason: HOSPADM

## 2018-06-15 RX ORDER — HYDRALAZINE HYDROCHLORIDE 50 MG/1
50 TABLET, FILM COATED ORAL EVERY 8 HOURS
Status: DISCONTINUED | OUTPATIENT
Start: 2018-06-15 | End: 2018-06-16 | Stop reason: HOSPADM

## 2018-06-15 RX ORDER — SODIUM BICARBONATE 650 MG/1
650 TABLET ORAL 3 TIMES DAILY
Status: DISCONTINUED | OUTPATIENT
Start: 2018-06-15 | End: 2018-06-16 | Stop reason: HOSPADM

## 2018-06-15 RX ORDER — IBUPROFEN 200 MG
24 TABLET ORAL
Status: DISCONTINUED | OUTPATIENT
Start: 2018-06-15 | End: 2018-06-16 | Stop reason: HOSPADM

## 2018-06-15 RX ORDER — DILTIAZEM HYDROCHLORIDE 180 MG/1
180 CAPSULE, COATED, EXTENDED RELEASE ORAL DAILY
Status: DISCONTINUED | OUTPATIENT
Start: 2018-06-15 | End: 2018-06-16 | Stop reason: HOSPADM

## 2018-06-15 RX ORDER — GLUCAGON 1 MG
1 KIT INJECTION
Status: DISCONTINUED | OUTPATIENT
Start: 2018-06-15 | End: 2018-06-16 | Stop reason: HOSPADM

## 2018-06-15 RX ADMIN — HYDRALAZINE HYDROCHLORIDE 50 MG: 50 TABLET, FILM COATED ORAL at 09:06

## 2018-06-15 RX ADMIN — DILTIAZEM HYDROCHLORIDE 180 MG: 180 CAPSULE, COATED, EXTENDED RELEASE ORAL at 09:06

## 2018-06-15 RX ADMIN — HYDRALAZINE HYDROCHLORIDE 50 MG: 50 TABLET, FILM COATED ORAL at 12:06

## 2018-06-15 RX ADMIN — HEPARIN SODIUM 5000 UNITS: 5000 INJECTION, SOLUTION INTRAVENOUS; SUBCUTANEOUS at 09:06

## 2018-06-15 RX ADMIN — CLONIDINE HYDROCHLORIDE 0.3 MG: 0.3 TABLET ORAL at 12:06

## 2018-06-15 RX ADMIN — PANTOPRAZOLE SODIUM 40 MG: 40 TABLET, DELAYED RELEASE ORAL at 09:06

## 2018-06-15 RX ADMIN — HEPARIN SODIUM 5000 UNITS: 5000 INJECTION, SOLUTION INTRAVENOUS; SUBCUTANEOUS at 05:06

## 2018-06-15 RX ADMIN — HEPARIN SODIUM 5000 UNITS: 5000 INJECTION, SOLUTION INTRAVENOUS; SUBCUTANEOUS at 03:06

## 2018-06-15 RX ADMIN — CLONIDINE HYDROCHLORIDE 0.3 MG: 0.3 TABLET ORAL at 09:06

## 2018-06-15 RX ADMIN — SODIUM BICARBONATE TAB 650 MG 650 MG: 650 TAB at 03:06

## 2018-06-15 RX ADMIN — SODIUM BICARBONATE TAB 650 MG 650 MG: 650 TAB at 09:06

## 2018-06-15 RX ADMIN — HYDRALAZINE HYDROCHLORIDE 50 MG: 50 TABLET, FILM COATED ORAL at 03:06

## 2018-06-15 RX ADMIN — HYDRALAZINE HYDROCHLORIDE 50 MG: 50 TABLET, FILM COATED ORAL at 05:06

## 2018-06-15 RX ADMIN — SODIUM CHLORIDE: 450 INJECTION, SOLUTION INTRAVENOUS at 12:06

## 2018-06-15 NOTE — SUBJECTIVE & OBJECTIVE
Past Medical History:   Diagnosis Date    Anemia     sickle cell trait, alpha thalessemia    Anemia of chronic renal failure 5/12/2014    Asthma, chronic     Cataract     GERD (gastroesophageal reflux disease)     Gout, arthritis     Helicobacter pylori gastritis     Hypertension     Osteoarthritis     Pulmonary HTN     Renal insufficiency        Past Surgical History:   Procedure Laterality Date    CHOLECYSTECTOMY      endometrial polyp      FETAL BLOOD TRANSFUSION  12/11/2015    2 pints    THYROID SURGERY         Review of patient's allergies indicates:   Allergen Reactions    Allopurinol analogues Rash and Other (See Comments)     Sores in mouth  Sores in mouth  Sores in mouth    Diflucan [fluconazole] Swelling    Amlodipine Edema and Swelling    Ace inhibitors Nausea And Vomiting    Metronidazole Nausea And Vomiting    Phenytoin sodium extended Nausea And Vomiting     Current Facility-Administered Medications   Medication Frequency    cloNIDine tablet 0.3 mg BID    dextrose 50% injection 12.5 g PRN    dextrose 50% injection 25 g PRN    diltiaZEM 24 hr capsule 180 mg Daily    glucagon (human recombinant) injection 1 mg PRN    glucose chewable tablet 16 g PRN    glucose chewable tablet 24 g PRN    heparin (porcine) injection 5,000 Units Q8H    hydrALAZINE tablet 50 mg Q8H    pantoprazole EC tablet 40 mg Daily    sodium bicarbonate tablet 650 mg TID    sodium chloride 0.45% 1,000 mL with sodium bicarbonate 1 mEq/mL (8.4 %) 75 mEq infusion Continuous    sodium chloride 0.9% flush 5 mL PRN     Family History     Problem Relation (Age of Onset)    Breast cancer Sister    Cancer Mother, Sister    Glaucoma Sister    Liver cancer Mother, Sister    Thyroid disease Sister        Social History Main Topics    Smoking status: Never Smoker    Smokeless tobacco: Never Used    Alcohol use No    Drug use: No    Sexual activity: Not on file     Review of Systems   Constitutional: Positive  for activity change, appetite change and fatigue. Negative for fever.   HENT: Negative for congestion, facial swelling, sore throat, trouble swallowing and voice change.    Eyes: Negative for redness and visual disturbance.   Respiratory: Negative for apnea, cough, chest tightness, shortness of breath and wheezing.    Cardiovascular: Negative for chest pain, palpitations and leg swelling.   Gastrointestinal: Negative for abdominal distention, abdominal pain, blood in stool, constipation, diarrhea, nausea and vomiting.   Genitourinary: Negative for decreased urine volume, difficulty urinating, dysuria, flank pain, frequency, hematuria, pelvic pain and urgency.   Musculoskeletal: Positive for arthralgias. Negative for back pain, gait problem and joint swelling.   Skin: Negative for color change and rash.   Neurological: Positive for weakness. Negative for dizziness, syncope and headaches.   Hematological: Does not bruise/bleed easily.   Psychiatric/Behavioral: Negative for agitation, behavioral problems and confusion. The patient is not nervous/anxious.      Objective:     Vital Signs (Most Recent):  Temp: 97.5 °F (36.4 °C) (06/15/18 1153)  Pulse: 84 (06/15/18 1153)  Resp: 16 (06/15/18 1153)  BP: (!) 158/89 (06/15/18 1153)  SpO2: 98 % (06/15/18 1153)  O2 Device (Oxygen Therapy): room air (06/15/18 1002) Vital Signs (24h Range):  Temp:  [97.5 °F (36.4 °C)-98.1 °F (36.7 °C)] 97.5 °F (36.4 °C)  Pulse:  [] 84  Resp:  [16-19] 16  SpO2:  [98 %-100 %] 98 %  BP: (152-193)/() 158/89     Weight: 65.5 kg (144 lb 6.4 oz) (06/14/18 1658)  Body mass index is 26.41 kg/m².  Body surface area is 1.69 meters squared.    I/O last 3 completed shifts:  In: 2000 [I.V.:1000; IV Piggyback:1000]  Out: 750 [Urine:750]    Physical Exam   Constitutional: She is oriented to person, place, and time. She appears well-developed. No distress.   HENT:   Head: Normocephalic and atraumatic.   Mouth/Throat: Oropharynx is clear and moist. No  oropharyngeal exudate.   Eyes: Conjunctivae and EOM are normal. Pupils are equal, round, and reactive to light.   Neck: Normal range of motion. Neck supple. No JVD present. Carotid bruit is not present. No tracheal deviation present. No thyroid mass and no thyromegaly present.   Cardiovascular: Normal rate, regular rhythm and intact distal pulses.  Exam reveals no gallop and no friction rub.    Murmur heard.  Pulmonary/Chest: Effort normal. No respiratory distress. She has no wheezes. She has rales. She exhibits no tenderness.   Abdominal: Soft. Bowel sounds are normal. She exhibits no distension, no abdominal bruit, no ascites and no mass. There is no hepatosplenomegaly. There is no tenderness. There is no rebound, no guarding and no CVA tenderness.   Musculoskeletal: She exhibits no edema or tenderness.   Lymphadenopathy:     She has no cervical adenopathy.   Neurological: She is alert and oriented to person, place, and time. No cranial nerve deficit. She exhibits normal muscle tone. Coordination normal.   Skin: Skin is warm and intact. No rash noted. No erythema. No pallor.   Psychiatric: She has a normal mood and affect.       Significant Labs:  CBC:   Recent Labs  Lab 06/15/18  0815   WBC 5.66   RBC 3.69*   HGB 8.4*   HCT 26.1*      MCV 71*   MCH 22.8*   MCHC 32.2     CMP:   Recent Labs  Lab 06/15/18  0815   GLU 80   CALCIUM 9.3   ALBUMIN 3.2*   PROT 8.6*      K 4.6   CO2 15*      *   CREATININE 6.6*   ALKPHOS 73   ALT 19   AST 39   BILITOT 0.3     Coagulation:   Recent Labs  Lab 06/14/18  1807   INR 1.0   APTT <21.0     LFTs:   Recent Labs  Lab 06/15/18  0815   ALT 19   AST 39   ALKPHOS 73   BILITOT 0.3   PROT 8.6*   ALBUMIN 3.2*     All labs within the past 24 hours have been reviewed.    Significant Imaging: reviewed

## 2018-06-15 NOTE — HOSPITAL COURSE
Ms Sanz is a 86 year old female with PMHx of CKD stage 5, HTN, anemia and recently diagnosis UTI who was admitted to [facility] with increase weakness, dehydration and fatigue. On admit K 5.8, , Creatinine 7.4. Nephrology consult, patient was seen by Dr Fofana. She was started on Sodium Bicarb infusion. Patient is feeling much better today. Creatinine down to 6.3 and potassium 4.7 this AM. Urine cultures pending. Vital signs stable. Patient has been evaluated today by Ct on today. She has anemia from CKD and will follow up with hematology after discharge.  to discuss hospice with family.  Denies associated symptoms of chest pain or shortness of breath. Patient was seen, examined and deem suitable for discharge.

## 2018-06-15 NOTE — HPI
Gloria Sanz is a pleasant 86 y.o. AA woman with history of HTN, gout, osteoarthritis, anemia, asthma, pulmon. HTN, CKD stage 5 ( baseline serum cr about 5 ) was admitted to hospital yesterday for SEGUNDO and malaise, she was recently treated for a UTI , according to the dtr pt has been not eating well and drinking enough fluids, renal fn is improving with hydration ,

## 2018-06-15 NOTE — PLAN OF CARE
06/15/18 1442   Discharge Assessment   Assessment Type Discharge Planning Assessment   Confirmed/corrected address and phone number on facesheet? Yes   Assessment information obtained from? Patient;Medical Record   Prior to hospitilization cognitive status: Alert/Oriented   Current cognitive status: Alert/Oriented   Lives With child(ga), adult   Able to Return to Prior Arrangements yes   Is patient able to care for self after discharge? Yes   Who are your caregiver(s) and their phone number(s)? henrietta Crowder, [phone]   Patient's perception of discharge disposition admitted as an inpatient   Readmission Within The Last 30 Days no previous admission in last 30 days   Patient currently being followed by outpatient case management? No   Patient currently receives any other outside agency services? No   Do you have any problems affording any of your prescribed medications? TBD   Is the patient taking medications as prescribed? yes   Does the patient have transportation home? Yes   Transportation Available car;family or friend will provide   Discharge Plan A Home with family;Home   Patient/Family In Agreement With Plan yes

## 2018-06-15 NOTE — ASSESSMENT & PLAN NOTE
Improving with IV hydration, continue with Bicarb fluids as recommended by nephrology  Avoid nephrotoxic drugs  Renal consult  Continue to monitor.

## 2018-06-15 NOTE — SUBJECTIVE & OBJECTIVE
Review of Systems   Constitutional: Positive for appetite change and fatigue. Negative for chills, diaphoresis and fever.   HENT: Negative for congestion, nosebleeds, sinus pressure and sore throat.    Eyes: Negative for pain, discharge and visual disturbance.   Respiratory: Negative for cough, chest tightness, shortness of breath, wheezing and stridor.    Cardiovascular: Negative for chest pain, palpitations and leg swelling.   Gastrointestinal: Negative for abdominal distention, abdominal pain, blood in stool, constipation, diarrhea, nausea and vomiting.   Endocrine: Negative for cold intolerance and heat intolerance.   Genitourinary: Negative for difficulty urinating, dysuria, flank pain, frequency and urgency.   Musculoskeletal: Negative for arthralgias, back pain, joint swelling, myalgias, neck pain and neck stiffness.   Skin: Negative for rash and wound.   Allergic/Immunologic: Negative for food allergies and immunocompromised state.   Neurological: Positive for weakness. Negative for dizziness, seizures, syncope, facial asymmetry, speech difficulty, light-headedness, numbness and headaches.   Hematological: Negative for adenopathy.   Psychiatric/Behavioral: Negative for agitation, confusion and hallucinations.     Objective:     Vital Signs (Most Recent):  Temp: 97.5 °F (36.4 °C) (06/15/18 1153)  Pulse: 84 (06/15/18 1153)  Resp: 16 (06/15/18 1153)  BP: (!) 158/89 (06/15/18 1153)  SpO2: 98 % (06/15/18 1153) Vital Signs (24h Range):  Temp:  [97.5 °F (36.4 °C)-98.1 °F (36.7 °C)] 97.5 °F (36.4 °C)  Pulse:  [] 84  Resp:  [16-19] 16  SpO2:  [98 %-100 %] 98 %  BP: (144-193)/() 158/89     Weight: 65.5 kg (144 lb 6.4 oz)  Body mass index is 26.41 kg/m².    Intake/Output Summary (Last 24 hours) at 06/15/18 1501  Last data filed at 06/14/18 2300   Gross per 24 hour   Intake             2000 ml   Output              750 ml   Net             1250 ml      Physical Exam   Constitutional: She is oriented to  person, place, and time. No distress.   Frail, elderly    HENT:   Head: Normocephalic and atraumatic.   Nose: Nose normal.   Mouth/Throat: Oropharynx is clear and moist.   Eyes: Conjunctivae and EOM are normal. No scleral icterus.   Neck: Normal range of motion. Neck supple. No tracheal deviation present.   Cardiovascular: Normal rate, regular rhythm, normal heart sounds and intact distal pulses.  Exam reveals no gallop and no friction rub.    No murmur heard.  Pulmonary/Chest: Effort normal and breath sounds normal. No stridor. No respiratory distress. She has no wheezes. She has no rales. She exhibits no tenderness.   Abdominal: Soft. Bowel sounds are normal. She exhibits no distension and no mass. There is no tenderness. There is no rebound and no guarding.   Musculoskeletal: Normal range of motion. She exhibits no edema, tenderness or deformity.   Neurological: She is alert and oriented to person, place, and time. No cranial nerve deficit. She exhibits normal muscle tone. Coordination normal.   Skin: Skin is warm and dry. No rash noted. She is not diaphoretic. No erythema. No pallor.   Psychiatric: She has a normal mood and affect. Her behavior is normal. Thought content normal.   Nursing note and vitals reviewed.      Significant Labs:   BMP:   Recent Labs  Lab 06/14/18  2246 06/15/18  0815   GLU 85 80    141   K 4.5 4.6    110   CO2 14* 15*   * 110*   CREATININE 6.7* 6.6*   CALCIUM 8.8 9.3   MG 1.6  --      CBC:   Recent Labs  Lab 06/14/18  1807 06/15/18  0815   WBC 6.99 5.66   HGB 8.1* 8.4*   HCT 25.3* 26.1*    290     CMP:   Recent Labs  Lab 06/14/18 1807 06/14/18 2246 06/15/18  0815   * 138 141   K 5.8* 4.5 4.6    108 110   CO2 14* 14* 15*   GLU 87 85 80   * 112* 110*   CREATININE 7.4* 6.7* 6.6*   CALCIUM 9.7 8.8 9.3   PROT 9.1* 8.2 8.6*   ALBUMIN 3.4* 3.1* 3.2*   BILITOT 0.3 0.3 0.3   ALKPHOS 70 67 73   AST 42* 37 39   ALT 17 17 19   ANIONGAP 18* 16 16    EGFRNONAA 5* 5* 5*     All pertinent labs within the past 24 hours have been reviewed.    Significant Imaging:   Imaging Results    None

## 2018-06-15 NOTE — SUBJECTIVE & OBJECTIVE
Past Medical History:   Diagnosis Date    Anemia     sickle cell trait, alpha thalessemia    Anemia of chronic renal failure 5/12/2014    Asthma, chronic     Cataract     GERD (gastroesophageal reflux disease)     Gout, arthritis     Helicobacter pylori gastritis     Hypertension     Osteoarthritis     Pulmonary HTN     Renal insufficiency        Past Surgical History:   Procedure Laterality Date    CHOLECYSTECTOMY      endometrial polyp      FETAL BLOOD TRANSFUSION  12/11/2015    2 pints    THYROID SURGERY         Review of patient's allergies indicates:   Allergen Reactions    Allopurinol analogues Rash and Other (See Comments)     Sores in mouth  Sores in mouth  Sores in mouth    Diflucan [fluconazole] Swelling    Amlodipine Edema and Swelling    Ace inhibitors Nausea And Vomiting    Metronidazole Nausea And Vomiting    Phenytoin sodium extended Nausea And Vomiting       No current facility-administered medications on file prior to encounter.      Current Outpatient Prescriptions on File Prior to Encounter   Medication Sig    albuterol 90 mcg/actuation inhaler Inhale 2 puffs into the lungs every 6 (six) hours as needed for Wheezing.    ammonium lactate (AMLACTIN) 12 % lotion Use daily.  Apply to damp skin after bathing.    B complex vitamins (B COMPLEX) TbSR Take by mouth. 1 Tablet Extended Release Oral Every day    CALCIUM CARBONATE/VITAMIN D3 (CALCIUM 500 + D, D3, ORAL) Take by mouth. 1 Tablet Oral Twice a day    cloNIDine (CATAPRES) 0.3 MG tablet take 1 tablet by mouth twice a day    diltiaZEM (CARDIZEM CD) 180 MG 24 hr capsule take 1 capsule by mouth once daily    docusate sodium (COLACE) 100 MG capsule Take 1 capsule (100 mg total) by mouth 2 (two) times daily as needed for Constipation.    epoetin traci (PROCRIT) 40,000 unit/mL injection Inject 40,000 Units into the skin every 14 (fourteen) days. Or as instructed by MD    fluticasone (FLONASE) 50 mcg/actuation nasal spray 2  sprays by Each Nare route daily as needed.    furosemide (LASIX) 20 MG tablet Take 1 tablet (20 mg total) by mouth daily as needed.    hydrALAZINE (APRESOLINE) 50 MG tablet Take 50 mg by mouth.    ketoconazole (NIZORAL) 2 % shampoo Wash hair with medicated shampoo at least 2x/week - let sit on scalp at least 5 minutes prior to rinsing    polyethylene glycol (GLYCOLAX) 17 gram PwPk TAKE 17 GRAMS WITH WATER BY MOUTH DAILY    senna-docusate 8.6-50 mg (PERICOLACE) 8.6-50 mg per tablet Take by mouth.    sodium bicarbonate 650 MG tablet Take 1 tablet (650 mg total) by mouth 3 (three) times daily.    triamcinolone acetonide 0.1% (KENALOG) 0.1 % ointment apply to affected area twice a day    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 3 nights/week     Family History     Problem Relation (Age of Onset)    Breast cancer Sister    Cancer Mother, Sister    Glaucoma Sister    Liver cancer Mother, Sister    Thyroid disease Sister        Social History Main Topics    Smoking status: Never Smoker    Smokeless tobacco: Never Used    Alcohol use No    Drug use: No    Sexual activity: Not on file     Review of Systems   Constitutional: Negative for chills, diaphoresis, fatigue and fever.   HENT: Negative for congestion, sore throat and voice change.    Eyes: Negative for photophobia and visual disturbance.   Respiratory: Negative for cough, shortness of breath, wheezing and stridor.    Cardiovascular: Negative for chest pain and leg swelling.   Gastrointestinal: Negative for abdominal distention, abdominal pain, constipation, diarrhea, nausea and vomiting.   Endocrine: Negative for polydipsia, polyphagia and polyuria.   Genitourinary: Negative for difficulty urinating, dysuria, flank pain, pelvic pain, urgency and vaginal discharge.        Positive abnormal Kidney Labs   Musculoskeletal: Negative for back pain, joint swelling, neck pain and neck stiffness.   Skin: Negative for color change and rash.   Allergic/Immunologic:  Negative for immunocompromised state.   Neurological: Positive for weakness. Negative for dizziness, syncope, numbness and headaches.   Hematological: Does not bruise/bleed easily.   Psychiatric/Behavioral: Negative for agitation, behavioral problems and confusion.     Objective:     Vital Signs (Most Recent):  Temp: 97.9 °F (36.6 °C) (06/14/18 1658)  Pulse: 94 (06/14/18 2301)  Resp: 19 (06/14/18 1844)  BP: (!) 174/89 (06/14/18 2301)  SpO2: 99 % (06/14/18 2301) Vital Signs (24h Range):  Temp:  [97.9 °F (36.6 °C)] 97.9 °F (36.6 °C)  Pulse:  [] 94  Resp:  [18-19] 19  SpO2:  [99 %-100 %] 99 %  BP: (144-175)/(79-97) 174/89     Weight: 65.5 kg (144 lb 6.4 oz)  Body mass index is 26.41 kg/m².    Physical Exam   Constitutional: She is oriented to person, place, and time. She appears well-developed and well-nourished. No distress.   Elderly     HENT:   Head: Normocephalic and atraumatic.   Nose: Nose normal.   Dry oral membranes     Eyes: Conjunctivae and EOM are normal. Pupils are equal, round, and reactive to light. No scleral icterus.   Neck: Normal range of motion. Neck supple. No tracheal deviation present.   Cardiovascular: Normal rate, regular rhythm, normal heart sounds and intact distal pulses.    No murmur heard.  Pulmonary/Chest: Effort normal and breath sounds normal. No stridor. No respiratory distress. She has no wheezes. She has no rales.   Abdominal: Soft. Bowel sounds are normal. She exhibits no distension. There is no tenderness. There is no guarding.   Genitourinary:   Genitourinary Comments: ua neg     Musculoskeletal: Normal range of motion. She exhibits no edema or deformity.   General weakness     Neurological: She is alert and oriented to person, place, and time. No cranial nerve deficit.   Skin: Skin is warm and dry. Capillary refill takes less than 2 seconds. No rash noted. She is not diaphoretic.   Decreased skin turgor     Psychiatric: She has a normal mood and affect. Her behavior is  normal. Judgment and thought content normal.   Nursing note and vitals reviewed.        CRANIAL NERVES     CN III, IV, VI   Pupils are equal, round, and reactive to light.  Extraocular motions are normal.        Significant Labs: All pertinent labs within the past 24 hours have been reviewed.  Results for orders placed or performed during the hospital encounter of 06/14/18   CBC auto differential   Result Value Ref Range    WBC 6.99 3.90 - 12.70 K/uL    RBC 3.59 (L) 4.00 - 5.40 M/uL    Hemoglobin 8.1 (L) 12.0 - 16.0 g/dL    Hematocrit 25.3 (L) 37.0 - 48.5 %    MCV 71 (L) 82 - 98 fL    MCH 22.6 (L) 27.0 - 31.0 pg    MCHC 32.0 32.0 - 36.0 g/dL    RDW 19.2 (H) 11.5 - 14.5 %    Platelets 216 150 - 350 K/uL    MPV 9.2 9.2 - 12.9 fL    Gran # (ANC) 4.9 1.8 - 7.7 K/uL    Lymph # 0.9 (L) 1.0 - 4.8 K/uL    Mono # 1.2 (H) 0.3 - 1.0 K/uL    Eos # 0.0 0.0 - 0.5 K/uL    Baso # 0.01 0.00 - 0.20 K/uL    Gran% 69.9 38.0 - 73.0 %    Lymph% 12.3 (L) 18.0 - 48.0 %    Mono% 17.6 (H) 4.0 - 15.0 %    Eosinophil% 0.4 0.0 - 8.0 %    Basophil% 0.1 0.0 - 1.9 %    Platelet Estimate Clumped (A)     Aniso Slight     Poik Slight     Poly Occasional     Hypo Moderate     Ovalocytes Occasional     Target Cells Occasional     Tear Drop Cells Occasional     [place] Cells Occasional     Stomatocytes Present     Spherocytes Occasional     Schistocytes Present     Differential Method Automated    Comprehensive metabolic panel   Result Value Ref Range    Sodium 134 (L) 136 - 145 mmol/L    Potassium 5.8 (H) 3.5 - 5.1 mmol/L    Chloride 102 95 - 110 mmol/L    CO2 14 (L) 23 - 29 mmol/L    Glucose 87 70 - 110 mg/dL    BUN, Bld 113 (H) 8 - 23 mg/dL    Creatinine 7.4 (H) 0.5 - 1.4 mg/dL    Calcium 9.7 8.7 - 10.5 mg/dL    Total Protein 9.1 (H) 6.0 - 8.4 g/dL    Albumin 3.4 (L) 3.5 - 5.2 g/dL    Total Bilirubin 0.3 0.1 - 1.0 mg/dL    Alkaline Phosphatase 70 55 - 135 U/L    AST 42 (H) 10 - 40 U/L    ALT 17 10 - 44 U/L    Anion Gap 18 (H) 8 - 16 mmol/L    eGFR if   5 (A) >60 mL/min/1.73 m^2    eGFR if non African American 5 (A) >60 mL/min/1.73 m^2   APTT   Result Value Ref Range    aPTT <21.0 21.0 - 32.0 sec   Protime-INR   Result Value Ref Range    Prothrombin Time 10.3 9.0 - 12.5 sec    INR 1.0 0.8 - 1.2   Urinalysis   Result Value Ref Range    Specimen UA Urine, Clean Catch     Color, UA Yellow Yellow, Straw, Fabiana    Appearance, UA Clear Clear    pH, UA 6.0 5.0 - 8.0    Specific Gravity, UA <=1.005 (A) 1.005 - 1.030    Protein, UA Trace (A) Negative    Glucose, UA Negative Negative    Ketones, UA Negative Negative    Bilirubin (UA) Negative Negative    Occult Blood UA Negative Negative    Nitrite, UA Negative Negative    Urobilinogen, UA Negative <2.0 EU/dL    Leukocytes, UA 3+ (A) Negative   Urinalysis Microscopic   Result Value Ref Range    WBC, UA 11 (H) 0 - 5 /hpf    Microscopic Comment SEE COMMENT        Significant Imaging: I have reviewed all pertinent imaging results/findings within the past 24 hours.   Imaging Results    None

## 2018-06-15 NOTE — PLAN OF CARE
Problem: Patient Care Overview  Goal: Plan of Care Review  Outcome: Ongoing (interventions implemented as appropriate)  Pt AAO x4.  VSS.  Pt remained afebrile throughout this shift.   IV fluids administered per order.   Pt remained free of falls this shift.   Pt has no complaints of pain this shift.  Plan of care reviewed. Patient verbalizes understanding.   Pain meds administered as ordered.   Pt moving/turing independently.   Patient SR on monitor.   Bed low, side rails up x 2, wheels locked, call light in reach.   Patient instructed to call for assistance.   Hourly rounding completed.   Will continue to monitor.

## 2018-06-15 NOTE — CONSULTS
[facility]  Nephrology  Consult Note      Patient Name: Gloria Sanz  MRN: [id number]  Admission Date: 6/14/2018  Hospital Length of Stay: 0 days  Attending Provider: Buck Grove MD   Primary Care Physician: Derrick Desouza MD  Principal Problem:Acute renal failure superimposed on stage 5 chronic kidney disease, not on chronic dialysis    Consults  Subjective:     HPI: Gloria Sanz is a pleasant 86 y.o. AA woman with history of HTN, gout, osteoarthritis, anemia, asthma, pulmon. HTN, CKD stage 5 ( baseline serum cr about 5 ) was admitted to hospital yesterday for SEGUNDO and malaise, she was recently treated for a UTI , according to the dtr pt has been not eating well and drinking enough fluids, renal fn is improving with hydration ,      Past Medical History:   Diagnosis Date    Anemia     sickle cell trait, alpha thalessemia    Anemia of chronic renal failure 5/12/2014    Asthma, chronic     Cataract     GERD (gastroesophageal reflux disease)     Gout, arthritis     Helicobacter pylori gastritis     Hypertension     Osteoarthritis     Pulmonary HTN     Renal insufficiency        Past Surgical History:   Procedure Laterality Date    CHOLECYSTECTOMY      endometrial polyp      FETAL BLOOD TRANSFUSION  12/11/2015    2 pints    THYROID SURGERY         Review of patient's allergies indicates:   Allergen Reactions    Allopurinol analogues Rash and Other (See Comments)     Sores in mouth  Sores in mouth  Sores in mouth    Diflucan [fluconazole] Swelling    Amlodipine Edema and Swelling    Ace inhibitors Nausea And Vomiting    Metronidazole Nausea And Vomiting    Phenytoin sodium extended Nausea And Vomiting     Current Facility-Administered Medications   Medication Frequency    cloNIDine tablet 0.3 mg BID    dextrose 50% injection 12.5 g PRN    dextrose 50% injection 25 g PRN    diltiaZEM 24 hr capsule 180 mg Daily    glucagon (human recombinant) injection 1 mg PRN     glucose chewable tablet 16 g PRN    glucose chewable tablet 24 g PRN    heparin (porcine) injection 5,000 Units Q8H    hydrALAZINE tablet 50 mg Q8H    pantoprazole EC tablet 40 mg Daily    sodium bicarbonate tablet 650 mg TID    sodium chloride 0.45% 1,000 mL with sodium bicarbonate 1 mEq/mL (8.4 %) 75 mEq infusion Continuous    sodium chloride 0.9% flush 5 mL PRN     Family History     Problem Relation (Age of Onset)    Breast cancer Sister    Cancer Mother, Sister    Glaucoma Sister    Liver cancer Mother, Sister    Thyroid disease Sister        Social History Main Topics    Smoking status: Never Smoker    Smokeless tobacco: Never Used    Alcohol use No    Drug use: No    Sexual activity: Not on file     Review of Systems   Constitutional: Positive for activity change, appetite change and fatigue. Negative for fever.   HENT: Negative for congestion, facial swelling, sore throat, trouble swallowing and voice change.    Eyes: Negative for redness and visual disturbance.   Respiratory: Negative for apnea, cough, chest tightness, shortness of breath and wheezing.    Cardiovascular: Negative for chest pain, palpitations and leg swelling.   Gastrointestinal: Negative for abdominal distention, abdominal pain, blood in stool, constipation, diarrhea, nausea and vomiting.   Genitourinary: Negative for decreased urine volume, difficulty urinating, dysuria, flank pain, frequency, hematuria, pelvic pain and urgency.   Musculoskeletal: Positive for arthralgias. Negative for back pain, gait problem and joint swelling.   Skin: Negative for color change and rash.   Neurological: Positive for weakness. Negative for dizziness, syncope and headaches.   Hematological: Does not bruise/bleed easily.   Psychiatric/Behavioral: Negative for agitation, behavioral problems and confusion. The patient is not nervous/anxious.      Objective:     Vital Signs (Most Recent):  Temp: 97.5 °F (36.4 °C) (06/15/18 1153)  Pulse: 84 (06/15/18  1153)  Resp: 16 (06/15/18 1153)  BP: (!) 158/89 (06/15/18 1153)  SpO2: 98 % (06/15/18 1153)  O2 Device (Oxygen Therapy): room air (06/15/18 1002) Vital Signs (24h Range):  Temp:  [97.5 °F (36.4 °C)-98.1 °F (36.7 °C)] 97.5 °F (36.4 °C)  Pulse:  [] 84  Resp:  [16-19] 16  SpO2:  [98 %-100 %] 98 %  BP: (152-193)/() 158/89     Weight: 65.5 kg (144 lb 6.4 oz) (06/14/18 1658)  Body mass index is 26.41 kg/m².  Body surface area is 1.69 meters squared.    I/O last 3 completed shifts:  In: 2000 [I.V.:1000; IV Piggyback:1000]  Out: 750 [Urine:750]    Physical Exam   Constitutional: She is oriented to person, place, and time. She appears well-developed. No distress.   HENT:   Head: Normocephalic and atraumatic.   Mouth/Throat: Oropharynx is clear and moist. No oropharyngeal exudate.   Eyes: Conjunctivae and EOM are normal. Pupils are equal, round, and reactive to light.   Neck: Normal range of motion. Neck supple. No JVD present. Carotid bruit is not present. No tracheal deviation present. No thyroid mass and no thyromegaly present.   Cardiovascular: Normal rate, regular rhythm and intact distal pulses.  Exam reveals no gallop and no friction rub.    Murmur heard.  Pulmonary/Chest: Effort normal. No respiratory distress. She has no wheezes. She has rales. She exhibits no tenderness.   Abdominal: Soft. Bowel sounds are normal. She exhibits no distension, no abdominal bruit, no ascites and no mass. There is no hepatosplenomegaly. There is no tenderness. There is no rebound, no guarding and no CVA tenderness.   Musculoskeletal: She exhibits no edema or tenderness.   Lymphadenopathy:     She has no cervical adenopathy.   Neurological: She is alert and oriented to person, place, and time. No cranial nerve deficit. She exhibits normal muscle tone. Coordination normal.   Skin: Skin is warm and intact. No rash noted. No erythema. No pallor.   Psychiatric: She has a normal mood and affect.       Significant Labs:  CBC:    Recent Labs  Lab 06/15/18  0815   WBC 5.66   RBC 3.69*   HGB 8.4*   HCT 26.1*      MCV 71*   MCH 22.8*   MCHC 32.2     CMP:   Recent Labs  Lab 06/15/18  0815   GLU 80   CALCIUM 9.3   ALBUMIN 3.2*   PROT 8.6*      K 4.6   CO2 15*      *   CREATININE 6.6*   ALKPHOS 73   ALT 19   AST 39   BILITOT 0.3     Coagulation:   Recent Labs  Lab 06/14/18  1807   INR 1.0   APTT <21.0     LFTs:   Recent Labs  Lab 06/15/18  0815   ALT 19   AST 39   ALKPHOS 73   BILITOT 0.3   PROT 8.6*   ALBUMIN 3.2*     All labs within the past 24 hours have been reviewed.    Significant Imaging: reviewed     Assessment/Plan:     * Acute renal failure superimposed on stage 5 chronic kidney disease, not on chronic dialysis    1. SEGUNDO on CKD stage 5 : baseline serum cr is about 5 mg/dl, worsening renal fn due to dehydration , cont gentle hydration , no indication for RRT ,     2. HTN : BP controlled, follow    3. Hyperkalemia : improved     4. Anemia of CKD : follow Hb , pRBC tx when indicated,     5. Met acidosis : cont bicarb supplements     6. Repeat urine cx , recent UTI                 Thank you for your consult. I will follow-up with patient. Please contact us if you have any additional questions.     Total time spent 70 minutes including time needed to review the records,  patient  evaluation, documentation, face-to-face discussion with the patient, dtr ,   primary team ,   more than 50% of the time was spent on coordination of care and counseling.       Murali Fofana MD   Nephrology  [facility]
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
## 2018-06-15 NOTE — ASSESSMENT & PLAN NOTE
1. SEGUNDO on CKD stage 5 : baseline serum cr is about 5 mg/dl, worsening renal fn due to dehydration , cont gentle hydration , no indication for RRT ,     2. HTN : BP controlled, follow    3. Hyperkalemia : improved     4. Anemia of CKD : follow Hb , pRBC tx when indicated,     5. Met acidosis : cont bicarb supplements     6. Repeat urine cx , recent UTI

## 2018-06-15 NOTE — H&P
[facility] Hospital Medicine  History & Physical    Patient Name: Gloria Sanz  MRN: [id number]  Admission Date: 6/14/2018  Attending Physician: Carlos Vergara MD  Primary Care Provider: Derrick Desouza MD         Patient information was obtained from patient, past medical records and ER records.     Subjective:     Principal Problem:Acute renal failure superimposed on stage 4 chronic kidney disease    Chief Complaint:   Chief Complaint   Patient presents with    Abnormal Lab     sent from Dr. [facility] for dehydration        HPI: Gloria Sanz is a 86 y.o. female patient  who was sent to ED by Nephrology for further evaluation of dehydration per patient. Associated symptoms include general weakness. Patient has no other complaints or concern and states she has had no decrease in oral intake.Patient denies any nausea, vomiting, fatigue, myalgias, dysuria or decreased urine, headache, and all other sxs at this time. No further complaints or concerns. Patient evaluated in Er. K 5.8,  CR 7.4. Patient was given 2L NS in Er and case discussed with nephrology. Er started Bicarb recommended and called hospital medicine for admission. Patients repeat BMP improved. Patient placed in obs for further evaluation/treatment with continuation of bicarb as recommended by nephrology.      Past Medical History:   Diagnosis Date    Anemia     sickle cell trait, alpha thalessemia    Anemia of chronic renal failure 5/12/2014    Asthma, chronic     Cataract     GERD (gastroesophageal reflux disease)     Gout, arthritis     Helicobacter pylori gastritis     Hypertension     Osteoarthritis     Pulmonary HTN     Renal insufficiency        Past Surgical History:   Procedure Laterality Date    CHOLECYSTECTOMY      endometrial polyp      FETAL BLOOD TRANSFUSION  12/11/2015    2 pints    THYROID SURGERY         Review of patient's allergies indicates:   Allergen Reactions    Allopurinol analogues  Rash and Other (See Comments)     Sores in mouth  Sores in mouth  Sores in mouth    Diflucan [fluconazole] Swelling    Amlodipine Edema and Swelling    Ace inhibitors Nausea And Vomiting    Metronidazole Nausea And Vomiting    Phenytoin sodium extended Nausea And Vomiting       No current facility-administered medications on file prior to encounter.      Current Outpatient Prescriptions on File Prior to Encounter   Medication Sig    albuterol 90 mcg/actuation inhaler Inhale 2 puffs into the lungs every 6 (six) hours as needed for Wheezing.    ammonium lactate (AMLACTIN) 12 % lotion Use daily.  Apply to damp skin after bathing.    B complex vitamins (B COMPLEX) TbSR Take by mouth. 1 Tablet Extended Release Oral Every day    CALCIUM CARBONATE/VITAMIN D3 (CALCIUM 500 + D, D3, ORAL) Take by mouth. 1 Tablet Oral Twice a day    cloNIDine (CATAPRES) 0.3 MG tablet take 1 tablet by mouth twice a day    diltiaZEM (CARDIZEM CD) 180 MG 24 hr capsule take 1 capsule by mouth once daily    docusate sodium (COLACE) 100 MG capsule Take 1 capsule (100 mg total) by mouth 2 (two) times daily as needed for Constipation.    epoetin traci (PROCRIT) 40,000 unit/mL injection Inject 40,000 Units into the skin every 14 (fourteen) days. Or as instructed by MD    fluticasone (FLONASE) 50 mcg/actuation nasal spray 2 sprays by Each Nare route daily as needed.    furosemide (LASIX) 20 MG tablet Take 1 tablet (20 mg total) by mouth daily as needed.    hydrALAZINE (APRESOLINE) 50 MG tablet Take 50 mg by mouth.    ketoconazole (NIZORAL) 2 % shampoo Wash hair with medicated shampoo at least 2x/week - let sit on scalp at least 5 minutes prior to rinsing    polyethylene glycol (GLYCOLAX) 17 gram PwPk TAKE 17 GRAMS WITH WATER BY MOUTH DAILY    senna-docusate 8.6-50 mg (PERICOLACE) 8.6-50 mg per tablet Take by mouth.    sodium bicarbonate 650 MG tablet Take 1 tablet (650 mg total) by mouth 3 (three) times daily.    triamcinolone  acetonide 0.1% (KENALOG) 0.1 % ointment apply to affected area twice a day    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 3 nights/week     Family History     Problem Relation (Age of Onset)    Breast cancer Sister    Cancer Mother, Sister    Glaucoma Sister    Liver cancer Mother, Sister    Thyroid disease Sister        Social History Main Topics    Smoking status: Never Smoker    Smokeless tobacco: Never Used    Alcohol use No    Drug use: No    Sexual activity: Not on file     Review of Systems   Constitutional: Negative for chills, diaphoresis, fatigue and fever.   HENT: Negative for congestion, sore throat and voice change.    Eyes: Negative for photophobia and visual disturbance.   Respiratory: Negative for cough, shortness of breath, wheezing and stridor.    Cardiovascular: Negative for chest pain and leg swelling.   Gastrointestinal: Negative for abdominal distention, abdominal pain, constipation, diarrhea, nausea and vomiting.   Endocrine: Negative for polydipsia, polyphagia and polyuria.   Genitourinary: Negative for difficulty urinating, dysuria, flank pain, pelvic pain, urgency and vaginal discharge.        Positive abnormal Kidney Labs   Musculoskeletal: Negative for back pain, joint swelling, neck pain and neck stiffness.   Skin: Negative for color change and rash.   Allergic/Immunologic: Negative for immunocompromised state.   Neurological: Positive for weakness. Negative for dizziness, syncope, numbness and headaches.   Hematological: Does not bruise/bleed easily.   Psychiatric/Behavioral: Negative for agitation, behavioral problems and confusion.     Objective:     Vital Signs (Most Recent):  Temp: 97.9 °F (36.6 °C) (06/14/18 1658)  Pulse: 94 (06/14/18 2301)  Resp: 19 (06/14/18 1844)  BP: (!) 174/89 (06/14/18 2301)  SpO2: 99 % (06/14/18 2301) Vital Signs (24h Range):  Temp:  [97.9 °F (36.6 °C)] 97.9 °F (36.6 °C)  Pulse:  [] 94  Resp:  [18-19] 19  SpO2:  [99 %-100 %] 99 %  BP: (144-175)/(79-97)  174/89     Weight: 65.5 kg (144 lb 6.4 oz)  Body mass index is 26.41 kg/m².    Physical Exam   Constitutional: She is oriented to person, place, and time. She appears well-developed and well-nourished. No distress.   Elderly     HENT:   Head: Normocephalic and atraumatic.   Nose: Nose normal.   Dry oral membranes     Eyes: Conjunctivae and EOM are normal. Pupils are equal, round, and reactive to light. No scleral icterus.   Neck: Normal range of motion. Neck supple. No tracheal deviation present.   Cardiovascular: Normal rate, regular rhythm, normal heart sounds and intact distal pulses.    No murmur heard.  Pulmonary/Chest: Effort normal and breath sounds normal. No stridor. No respiratory distress. She has no wheezes. She has no rales.   Abdominal: Soft. Bowel sounds are normal. She exhibits no distension. There is no tenderness. There is no guarding.   Genitourinary:   Genitourinary Comments: ua neg     Musculoskeletal: Normal range of motion. She exhibits no edema or deformity.   General weakness     Neurological: She is alert and oriented to person, place, and time. No cranial nerve deficit.   Skin: Skin is warm and dry. Capillary refill takes less than 2 seconds. No rash noted. She is not diaphoretic.   Decreased skin turgor     Psychiatric: She has a normal mood and affect. Her behavior is normal. Judgment and thought content normal.   Nursing note and vitals reviewed.        CRANIAL NERVES     CN III, IV, VI   Pupils are equal, round, and reactive to light.  Extraocular motions are normal.        Significant Labs: All pertinent labs within the past 24 hours have been reviewed.  Results for orders placed or performed during the hospital encounter of 06/14/18   CBC auto differential   Result Value Ref Range    WBC 6.99 3.90 - 12.70 K/uL    RBC 3.59 (L) 4.00 - 5.40 M/uL    Hemoglobin 8.1 (L) 12.0 - 16.0 g/dL    Hematocrit 25.3 (L) 37.0 - 48.5 %    MCV 71 (L) 82 - 98 fL    MCH 22.6 (L) 27.0 - 31.0 pg    MCHC  32.0 32.0 - 36.0 g/dL    RDW 19.2 (H) 11.5 - 14.5 %    Platelets 216 150 - 350 K/uL    MPV 9.2 9.2 - 12.9 fL    Gran # (ANC) 4.9 1.8 - 7.7 K/uL    Lymph # 0.9 (L) 1.0 - 4.8 K/uL    Mono # 1.2 (H) 0.3 - 1.0 K/uL    Eos # 0.0 0.0 - 0.5 K/uL    Baso # 0.01 0.00 - 0.20 K/uL    Gran% 69.9 38.0 - 73.0 %    Lymph% 12.3 (L) 18.0 - 48.0 %    Mono% 17.6 (H) 4.0 - 15.0 %    Eosinophil% 0.4 0.0 - 8.0 %    Basophil% 0.1 0.0 - 1.9 %    Platelet Estimate Clumped (A)     Aniso Slight     Poik Slight     Poly Occasional     Hypo Moderate     Ovalocytes Occasional     Target Cells Occasional     Tear Drop Cells Occasional     Plainfield Cells Occasional     Stomatocytes Present     Spherocytes Occasional     Schistocytes Present     Differential Method Automated    Comprehensive metabolic panel   Result Value Ref Range    Sodium 134 (L) 136 - 145 mmol/L    Potassium 5.8 (H) 3.5 - 5.1 mmol/L    Chloride 102 95 - 110 mmol/L    CO2 14 (L) 23 - 29 mmol/L    Glucose 87 70 - 110 mg/dL    BUN, Bld 113 (H) 8 - 23 mg/dL    Creatinine 7.4 (H) 0.5 - 1.4 mg/dL    Calcium 9.7 8.7 - 10.5 mg/dL    Total Protein 9.1 (H) 6.0 - 8.4 g/dL    Albumin 3.4 (L) 3.5 - 5.2 g/dL    Total Bilirubin 0.3 0.1 - 1.0 mg/dL    Alkaline Phosphatase 70 55 - 135 U/L    AST 42 (H) 10 - 40 U/L    ALT 17 10 - 44 U/L    Anion Gap 18 (H) 8 - 16 mmol/L    eGFR if African American 5 (A) >60 mL/min/1.73 m^2    eGFR if non African American 5 (A) >60 mL/min/1.73 m^2   APTT   Result Value Ref Range    aPTT <21.0 21.0 - 32.0 sec   Protime-INR   Result Value Ref Range    Prothrombin Time 10.3 9.0 - 12.5 sec    INR 1.0 0.8 - 1.2   Urinalysis   Result Value Ref Range    Specimen UA Urine, Clean Catch     Color, UA Yellow Yellow, Straw, Fabiana    Appearance, UA Clear Clear    pH, UA 6.0 5.0 - 8.0    Specific Gravity, UA <=1.005 (A) 1.005 - 1.030    Protein, UA Trace (A) Negative    Glucose, UA Negative Negative    Ketones, UA Negative Negative    Bilirubin (UA) Negative Negative    Occult  Blood UA Negative Negative    Nitrite, UA Negative Negative    Urobilinogen, UA Negative <2.0 EU/dL    Leukocytes, UA 3+ (A) Negative   Urinalysis Microscopic   Result Value Ref Range    WBC, UA 11 (H) 0 - 5 /hpf    Microscopic Comment SEE COMMENT        Significant Imaging: I have reviewed all pertinent imaging results/findings within the past 24 hours.   Imaging Results    None         I have personally reviewed the patients labs, imaging, ekg, most recent echo 5/2018 (normal EF, mild regurgitation and L atrial enlargment) and discussed the patient case in detail with the Er provider    Assessment/Plan:     * Acute renal failure superimposed on stage 4 chronic kidney disease    Improving with IV hydration, continue with Bicarb fluids as recommended by nephrology  Avoid nephrotoxic drugs  Renal consult  Continue to monitor.             Dehydration    Medication profie with number of stool softner/simulants. Consider education and adjustment pending course  Continue IV hydration  Hold lasix at this time  Monitor.         Anemia of chronic renal failure    Monitor cbc  Transfuse if need          Hypertension    Resume bp meds  Monitor trends              VTE Risk Mitigation         Ordered     heparin (porcine) injection 5,000 Units  Every 8 hours      06/15/18 0027     IP VTE HIGH RISK PATIENT  Once      06/15/18 0027     Place LUZ MARIA hose  Until discontinued      06/15/18 0027     IP VTE HIGH RISK PATIENT  Once      06/15/18 0027             Chacorta Crespo NP  Department of Hospital Medicine   [facility]

## 2018-06-15 NOTE — PROGRESS NOTES
[facility] Hospital Medicine  Progress Note    Patient Name: Gloria Sanz  MRN: [id number]  Patient Class: IP- Inpatient   Admission Date: 6/14/2018  Length of Stay: 0 days  Attending Physician: [place] MD  Primary Care Provider: Derrick Desouza MD        Subjective:     Principal Problem:Acute renal failure superimposed on stage 4 chronic kidney disease    HPI:  Gloria Sanz is a 86 y.o. female patient  who was sent to ED by Nephrology for further evaluation of dehydration per patient. Associated symptoms include general weakness. Patient has no other complaints or concern and states she has had no decrease in oral intake.Patient denies any nausea, vomiting, fatigue, myalgias, dysuria or decreased urine, headache, and all other sxs at this time. No further complaints or concerns. Patient evaluated in Er. K 5.8,  CR 7.4. Patient was given 2L NS in Er and case discussed with nephrology. Er started Bicarb recommended and called hospital medicine for admission. Patients repeat BMP improved. Patient placed in obs for further evaluation/treatment with continuation of bicarb as recommended by nephrology.      Hospital Course:  The pt was admitted with ARF/CKD5, dehydration, severe metabolic acidosis, and hyperkalemia. Pt has been offered HD in the past but chose not to initiate the treatment. Care discussed with Dr. Fofana who recommended sodium bicarb drip.   The pt complain of nausea, fatigue, and poor appetite. No other complaints       Review of Systems   Constitutional: Positive for appetite change and fatigue. Negative for chills, diaphoresis and fever.   HENT: Negative for congestion, nosebleeds, sinus pressure and sore throat.    Eyes: Negative for pain, discharge and visual disturbance.   Respiratory: Negative for cough, chest tightness, shortness of breath, wheezing and stridor.    Cardiovascular: Negative for chest pain, palpitations and leg swelling.    Gastrointestinal: Negative for abdominal distention, abdominal pain, blood in stool, constipation, diarrhea, nausea and vomiting.   Endocrine: Negative for cold intolerance and heat intolerance.   Genitourinary: Negative for difficulty urinating, dysuria, flank pain, frequency and urgency.   Musculoskeletal: Negative for arthralgias, back pain, joint swelling, myalgias, neck pain and neck stiffness.   Skin: Negative for rash and wound.   Allergic/Immunologic: Negative for food allergies and immunocompromised state.   Neurological: Positive for weakness. Negative for dizziness, seizures, syncope, facial asymmetry, speech difficulty, light-headedness, numbness and headaches.   Hematological: Negative for adenopathy.   Psychiatric/Behavioral: Negative for agitation, confusion and hallucinations.     Objective:     Vital Signs (Most Recent):  Temp: 97.5 °F (36.4 °C) (06/15/18 1153)  Pulse: 84 (06/15/18 1153)  Resp: 16 (06/15/18 1153)  BP: (!) 158/89 (06/15/18 1153)  SpO2: 98 % (06/15/18 1153) Vital Signs (24h Range):  Temp:  [97.5 °F (36.4 °C)-98.1 °F (36.7 °C)] 97.5 °F (36.4 °C)  Pulse:  [] 84  Resp:  [16-19] 16  SpO2:  [98 %-100 %] 98 %  BP: (144-193)/() 158/89     Weight: 65.5 kg (144 lb 6.4 oz)  Body mass index is 26.41 kg/m².    Intake/Output Summary (Last 24 hours) at 06/15/18 1501  Last data filed at 06/14/18 2300   Gross per 24 hour   Intake             2000 ml   Output              750 ml   Net             1250 ml      Physical Exam   Constitutional: She is oriented to person, place, and time. No distress.   Frail, elderly    HENT:   Head: Normocephalic and atraumatic.   Nose: Nose normal.   Mouth/Throat: Oropharynx is clear and moist.   Eyes: Conjunctivae and EOM are normal. No scleral icterus.   Neck: Normal range of motion. Neck supple. No tracheal deviation present.   Cardiovascular: Normal rate, regular rhythm, normal heart sounds and intact distal pulses.  Exam reveals no gallop and no  friction rub.    No murmur heard.  Pulmonary/Chest: Effort normal and breath sounds normal. No stridor. No respiratory distress. She has no wheezes. She has no rales. She exhibits no tenderness.   Abdominal: Soft. Bowel sounds are normal. She exhibits no distension and no mass. There is no tenderness. There is no rebound and no guarding.   Musculoskeletal: Normal range of motion. She exhibits no edema, tenderness or deformity.   Neurological: She is alert and oriented to person, place, and time. No cranial nerve deficit. She exhibits normal muscle tone. Coordination normal.   Skin: Skin is warm and dry. No rash noted. She is not diaphoretic. No erythema. No pallor.   Psychiatric: She has a normal mood and affect. Her behavior is normal. Thought content normal.   Nursing note and vitals reviewed.      Significant Labs:   BMP:   Recent Labs  Lab 06/14/18 2246 06/15/18  0815   GLU 85 80    141   K 4.5 4.6    110   CO2 14* 15*   * 110*   CREATININE 6.7* 6.6*   CALCIUM 8.8 9.3   MG 1.6  --      CBC:   Recent Labs  Lab 06/14/18 1807 06/15/18  0815   WBC 6.99 5.66   HGB 8.1* 8.4*   HCT 25.3* 26.1*    290     CMP:   Recent Labs  Lab 06/14/18 1807 06/14/18  2246 06/15/18  0815   * 138 141   K 5.8* 4.5 4.6    108 110   CO2 14* 14* 15*   GLU 87 85 80   * 112* 110*   CREATININE 7.4* 6.7* 6.6*   CALCIUM 9.7 8.8 9.3   PROT 9.1* 8.2 8.6*   ALBUMIN 3.4* 3.1* 3.2*   BILITOT 0.3 0.3 0.3   ALKPHOS 70 67 73   AST 42* 37 39   ALT 17 17 19   ANIONGAP 18* 16 16   EGFRNONAA 5* 5* 5*     All pertinent labs within the past 24 hours have been reviewed.    Significant Imaging:   Imaging Results    None       Assessment/Plan:      * Acute renal failure superimposed on stage 4 chronic kidney disease    Improving with IV hydration  continue with Bicarb fluids as recommended by nephrology  Avoid nephrotoxic drugs  Renal consult  Continue to monitor.             Metabolic acidosis               Dehydration    Continue IV hydration  Hold lasix at this time   Monitor.         Anemia of chronic renal failure    Monitor cbc  Transfuse if need          Hypertension    Resume bp meds  Monitor trends              VTE Risk Mitigation         Ordered     heparin (porcine) injection 5,000 Units  Every 8 hours      06/15/18 0027     IP VTE HIGH RISK PATIENT  Once      06/15/18 0027     Place LUZ MARIA hose  Until discontinued      06/15/18 0027     IP VTE HIGH RISK PATIENT  Once      06/15/18 0027              Lexi Rocha NP  Department of Hospital Medicine   [facility]

## 2018-06-15 NOTE — HPI
Gloria Sanz is a 86 y.o. female patient who was sent to ED by Nephrology for further evaluation of dehydration per patient. Associated symptoms include general weakness. Patient has no other complaints or concern and states she has had no decrease in oral intake.Patient denies any nausea, vomiting, fatigue, myalgias, dysuria or decreased urine, headache, and all other sxs at this time. No further complaints or concerns. Patient evaluated in Er. K 5.8,  CR 7.4. Patient was given 2L NS in Er and case discussed with nephrology. Er started Bicarb recommended and called hospital medicine for admission. Patients repeat BMP improved. Patient placed in obs for further evaluation/treatment with continuation of bicarb as recommended by nephrology.

## 2018-06-15 NOTE — ASSESSMENT & PLAN NOTE
Medication profie with number of stool softner/simulants. Consider education and adjustment pending course  Continue IV hydration  Monitor.

## 2018-06-15 NOTE — PROGRESS NOTES
"Patient arrived to unit from ER via bed.   Patient in room 235.  Bedside report given with Armida CM.  Charge nurse advised of patient arrival.   VS currently stable.   Tele monitored [id number] applied.   Patient oriented to room, rounding sheet and call bell.   Bed in lowest position, call light in reach.  Encouraged to notify of all needs.   Will continue to monitor.  BP (!) 162/88 (BP Location: Right arm, Patient Position: Lying)   Pulse 89   Temp 98.1 °F (36.7 °C) (Oral)   Resp 18   Ht 5' 2" (1.575 m)   Wt 65.5 kg (144 lb 6.4 oz)   SpO2 99%   Breastfeeding? No   BMI 26.41 kg/m²     "

## 2018-06-15 NOTE — ED NOTES
Pt assisted off bed pan. Pt given new linens and family given chairs. Pt lying in bed in NAD,VSS,RR equal and unlabored. Bed is low, locked, and call light in reach. Side rails up x 2.

## 2018-06-15 NOTE — ASSESSMENT & PLAN NOTE
Improving with IV hydration  continue with Bicarb fluids as recommended by nephrology  Avoid nephrotoxic drugs  Renal consult  Continue to monitor.

## 2018-06-16 VITALS
RESPIRATION RATE: 16 BRPM | BODY MASS INDEX: 26.45 KG/M2 | HEART RATE: 80 BPM | WEIGHT: 143.75 LBS | TEMPERATURE: 99 F | SYSTOLIC BLOOD PRESSURE: 125 MMHG | OXYGEN SATURATION: 100 % | HEIGHT: 62 IN | DIASTOLIC BLOOD PRESSURE: 68 MMHG

## 2018-06-16 LAB
ALBUMIN SERPL BCP-MCNC: 2.9 G/DL
ALP SERPL-CCNC: 71 U/L
ALT SERPL W/O P-5'-P-CCNC: 16 U/L
ANION GAP SERPL CALC-SCNC: 14 MMOL/L
AST SERPL-CCNC: 34 U/L
BACTERIA UR CULT: NORMAL
BASOPHILS # BLD AUTO: 0 K/UL
BASOPHILS NFR BLD: 0 %
BILIRUB SERPL-MCNC: 0.3 MG/DL
BUN SERPL-MCNC: 99 MG/DL
CALCIUM SERPL-MCNC: 9 MG/DL
CHLORIDE SERPL-SCNC: 110 MMOL/L
CO2 SERPL-SCNC: 17 MMOL/L
CREAT SERPL-MCNC: 6.2 MG/DL
DIFFERENTIAL METHOD: ABNORMAL
EOSINOPHIL # BLD AUTO: 0.2 K/UL
EOSINOPHIL NFR BLD: 4.2 %
ERYTHROCYTE [DISTWIDTH] IN BLOOD BY AUTOMATED COUNT: 19.3 %
EST. GFR  (AFRICAN AMERICAN): 6 ML/MIN/1.73 M^2
EST. GFR  (NON AFRICAN AMERICAN): 6 ML/MIN/1.73 M^2
GLUCOSE SERPL-MCNC: 93 MG/DL
HCT VFR BLD AUTO: 24.1 %
HGB BLD-MCNC: 7.8 G/DL
LYMPHOCYTES # BLD AUTO: 1.1 K/UL
LYMPHOCYTES NFR BLD: 22 %
MCH RBC QN AUTO: 22.7 PG
MCHC RBC AUTO-ENTMCNC: 32.4 G/DL
MCV RBC AUTO: 70 FL
MONOCYTES # BLD AUTO: 0.9 K/UL
MONOCYTES NFR BLD: 17.2 %
NEUTROPHILS # BLD AUTO: 2.9 K/UL
NEUTROPHILS NFR BLD: 56.6 %
PLATELET # BLD AUTO: 287 K/UL
PMV BLD AUTO: 9.2 FL
POTASSIUM SERPL-SCNC: 4.7 MMOL/L
PROT SERPL-MCNC: 7.9 G/DL
RBC # BLD AUTO: 3.44 M/UL
SODIUM SERPL-SCNC: 141 MMOL/L
WBC # BLD AUTO: 5.05 K/UL

## 2018-06-16 PROCEDURE — 85025 COMPLETE CBC W/AUTO DIFF WBC: CPT

## 2018-06-16 PROCEDURE — 80053 COMPREHEN METABOLIC PANEL: CPT

## 2018-06-16 PROCEDURE — 25000003 PHARM REV CODE 250: Performed by: NURSE PRACTITIONER

## 2018-06-16 PROCEDURE — 36415 COLL VENOUS BLD VENIPUNCTURE: CPT

## 2018-06-16 PROCEDURE — 63600175 PHARM REV CODE 636 W HCPCS: Performed by: NURSE PRACTITIONER

## 2018-06-16 PROCEDURE — 99232 SBSQ HOSP IP/OBS MODERATE 35: CPT | Mod: ,,, | Performed by: INTERNAL MEDICINE

## 2018-06-16 RX ADMIN — HYDRALAZINE HYDROCHLORIDE 50 MG: 50 TABLET, FILM COATED ORAL at 06:06

## 2018-06-16 RX ADMIN — HEPARIN SODIUM 5000 UNITS: 5000 INJECTION, SOLUTION INTRAVENOUS; SUBCUTANEOUS at 06:06

## 2018-06-16 RX ADMIN — PANTOPRAZOLE SODIUM 40 MG: 40 TABLET, DELAYED RELEASE ORAL at 08:06

## 2018-06-16 RX ADMIN — DILTIAZEM HYDROCHLORIDE 180 MG: 180 CAPSULE, COATED, EXTENDED RELEASE ORAL at 08:06

## 2018-06-16 RX ADMIN — CLONIDINE HYDROCHLORIDE 0.3 MG: 0.3 TABLET ORAL at 08:06

## 2018-06-16 RX ADMIN — SODIUM BICARBONATE TAB 650 MG 650 MG: 650 TAB at 08:06

## 2018-06-16 NOTE — PLAN OF CARE
Problem: Patient Care Overview  Goal: Plan of Care Review  Outcome: Ongoing (interventions implemented as appropriate)  Patient stable. Plan of care reviewed. Patient verbalizes understanding.  Patient remains without IV access. Urine output monitored. Bed low, wheels locked, bed alarm on, call light in reach. Patient instructed to call for assistance. Will continue to monitor.

## 2018-06-16 NOTE — NURSING
Lab resulted, Chacorta Crespo NP notified of labs, NP stated to call [facility] on call and notify of  Lab results.

## 2018-06-16 NOTE — SUBJECTIVE & OBJECTIVE
Interval History:  Feels better     Review of patient's allergies indicates:   Allergen Reactions    Allopurinol analogues Rash and Other (See Comments)     Sores in mouth  Sores in mouth  Sores in mouth    Diflucan [fluconazole] Swelling    Amlodipine Edema and Swelling    Ace inhibitors Nausea And Vomiting    Metronidazole Nausea And Vomiting    Phenytoin sodium extended Nausea And Vomiting     Current Facility-Administered Medications   Medication Frequency    cloNIDine tablet 0.3 mg BID    dextrose 50% injection 12.5 g PRN    dextrose 50% injection 25 g PRN    diltiaZEM 24 hr capsule 180 mg Daily    glucagon (human recombinant) injection 1 mg PRN    glucose chewable tablet 16 g PRN    glucose chewable tablet 24 g PRN    heparin (porcine) injection 5,000 Units Q8H    hydrALAZINE tablet 50 mg Q8H    pantoprazole EC tablet 40 mg Daily    sodium bicarbonate tablet 650 mg TID    sodium chloride 0.45% 1,000 mL with sodium bicarbonate 1 mEq/mL (8.4 %) 75 mEq infusion Continuous    sodium chloride 0.9% flush 5 mL PRN       Objective:     Vital Signs (Most Recent):  Temp: 98.8 °F (37.1 °C) (06/16/18 1109)  Pulse: 74 (06/16/18 1109)  Resp: 16 (06/16/18 1109)  BP: 125/68 (06/16/18 1109)  SpO2: 100 % (06/16/18 1109)  O2 Device (Oxygen Therapy): room air (06/16/18 1109) Vital Signs (24h Range):  Temp:  [97.7 °F (36.5 °C)-98.8 °F (37.1 °C)] 98.8 °F (37.1 °C)  Pulse:  [74-97] 74  Resp:  [16-19] 16  SpO2:  [97 %-100 %] 100 %  BP: (125-137)/(63-83) 125/68     Weight: 65.2 kg (143 lb 11.8 oz) (06/16/18 0755)  Body mass index is 26.29 kg/m².  Body surface area is 1.69 meters squared.    I/O last 3 completed shifts:  In: 2960 [P.O.:960; I.V.:1000; IV Piggyback:1000]  Out: 1400 [Urine:1400]    Physical Exam   Constitutional: She appears well-developed. No distress.   Frail looking   HENT:   Head: Normocephalic and atraumatic.   Mouth/Throat: Oropharynx is clear and moist. No oropharyngeal exudate.   Eyes:  Conjunctivae and EOM are normal. Pupils are equal, round, and reactive to light.   Neck: Normal range of motion. Neck supple. No JVD present. Carotid bruit is not present. No tracheal deviation present. No thyroid mass and no thyromegaly present.   Cardiovascular: Normal rate, regular rhythm, normal heart sounds and intact distal pulses.  Exam reveals no gallop and no friction rub.    No murmur heard.  Pulmonary/Chest: No respiratory distress. She has no wheezes. She has rales. She exhibits no tenderness.   Abdominal: Soft. Bowel sounds are normal. She exhibits no distension, no abdominal bruit, no ascites and no mass. There is no hepatosplenomegaly. There is no tenderness. There is no rebound, no guarding and no CVA tenderness.   Musculoskeletal: Normal range of motion. She exhibits no edema or tenderness.   Lymphadenopathy:     She has no cervical adenopathy.   Neurological: She is alert. No cranial nerve deficit. She exhibits normal muscle tone. Coordination normal.   Skin: Skin is warm and intact. No rash noted. No erythema. No pallor.   Psychiatric: She has a normal mood and affect.       Significant Labs:  CBC:   Recent Labs  Lab 06/16/18 0423   WBC 5.05   RBC 3.44*   HGB 7.8*   HCT 24.1*      MCV 70*   MCH 22.7*   MCHC 32.4     CMP:   Recent Labs  Lab 06/16/18 0423   GLU 93   CALCIUM 9.0   ALBUMIN 2.9*   PROT 7.9      K 4.7   CO2 17*      BUN 99*   CREATININE 6.2*   ALKPHOS 71   ALT 16   AST 34   BILITOT 0.3     Coagulation:   Recent Labs  Lab 06/14/18  1807   INR 1.0   APTT <21.0     LFTs:   Recent Labs  Lab 06/16/18 0423   ALT 16   AST 34   ALKPHOS 71   BILITOT 0.3   PROT 7.9   ALBUMIN 2.9*     All labs within the past 24 hours have been reviewed.     Significant Imaginreviewed reviewed

## 2018-06-16 NOTE — DISCHARGE SUMMARY
[facility]  Hospital Medicine  Discharge Summary      Patient Name: Gloria Sanz  MRN: [id number]  Admission Date: 6/14/2018  Hospital Length of Stay: 1 days  Discharge Date and Time: 6/16/2018 02:22 pm  Attending Physician: Salvador Bender MD   Discharging Provider: Kyle Hawkins NP  Primary Care Provider: Derrick Desouza MD      HPI:   Gloria Sanz is a 86 y.o. female patient  who was sent to ED by Nephrology for further evaluation of dehydration per patient. Associated symptoms include general weakness. Patient has no other complaints or concern and states she has had no decrease in oral intake.Patient denies any nausea, vomiting, fatigue, myalgias, dysuria or decreased urine, headache, and all other sxs at this time. No further complaints or concerns. Patient evaluated in Er. K 5.8,  CR 7.4. Patient was given 2L NS in Er and case discussed with nephrology. Er started Bicarb recommended and called hospital medicine for admission. Patients repeat BMP improved. Patient placed in obs for further evaluation/treatment with continuation of bicarb as recommended by nephrology.      * No surgery found *      Hospital Course:   Ms Sanz is a 86 year old female with PMHx of CKD stage 5, HTN, anemia and recently diagnosis UTI who was admitted to [facility] with increase weakness, dehydration and fatigue. On admit K 5.8, , Creatinine 7.4. Nephrology consult, patient was seen by Dr Fofana. She was started on Sodium Bicarb infusion. Patient is feeling much better today. Creatinine down to 6.3 and potassium 4.7 this AM. Urine cultures pending. Vital signs stable. Patient has been evaluated today by Ct on today. She has anemia from CKD and will follow up with hematology after discharge.  to discuss hospice with family.  Denies associated symptoms of chest pain or shortness of breath. Patient was seen, examined and deem suitable for discharge.        Consults:   Consults          Status Ordering Provider     Inpatient consult to Nephrology  Once     Provider:  Murali Fofana MD    Completed JANETH HIRSCH JR     Inpatient consult to Social Work  Once     Provider:  (Not yet assigned)    Completed RILEY BISHOP          No new Assessment & Plan notes have been filed under this hospital service since the last note was generated.  Service: Hospital Medicine    Final Active Diagnoses:    Diagnosis Date Noted POA    PRINCIPAL PROBLEM:  Acute renal failure superimposed on stage 5 chronic kidney disease, not on chronic dialysis [N17.9, N18.5] 06/14/2018 Yes    Metabolic acidosis [E87.2] 05/06/2018 Yes    Dehydration [E86.0] 01/13/2015 Yes    Anemia of chronic renal failure [N18.9, D63.1] 05/12/2014 Yes    Hypertension [I10]  Yes      Problems Resolved During this Admission:    Diagnosis Date Noted Date Resolved POA       Discharged Condition: stable    Disposition: Home-Health Care [facility]    Follow Up:  Follow-up Information     Dutch Munguia MD. Schedule an appointment as soon as possible for a visit in 5 days.    Specialty:  Nephrology  Why:  hospital follow up for anemia   Contact information:  [address] [place] [address]  [phone]             Marcus Clark MD. Schedule an appointment as soon as possible for a visit in 5 days.    Specialty:  Hematology and Oncology  Why:  hospital follow up   Contact information:  [address]  [place] [id number] [phone]                 Patient Instructions:     Ambulatory Referral to Nephrology   Referral Priority: Routine Referral Type: Consultation   Referral Reason: Specialty Services Required    Requested Specialty: Nephrology    Number of Visits Requested: 1      Ambulatory Referral to Hematology / Oncology   Referral Priority: Routine Referral Type: Consultation   Referral Reason: Specialty Services Required    Requested Specialty: Hematology and Oncology    Number of Visits Requested: 1      Activity as  tolerated     Notify your health care provider if you experience any of the following:  persistent dizziness, light-headedness, or visual disturbances         Significant Diagnostic Studies: Labs:   CMP   Recent Labs  Lab 06/14/18  2246 06/15/18  0815 06/15/18  1949 06/16/18  0423    141 142 141   K 4.5 4.6 4.6 4.7    110 110 110   CO2 14* 15* 17* 17*   GLU 85 80 125* 93   * 110* 102* 99*   CREATININE 6.7* 6.6* 6.3* 6.2*   CALCIUM 8.8 9.3 9.4 9.0   PROT 8.2 8.6*  --  7.9   ALBUMIN 3.1* 3.2*  --  2.9*   BILITOT 0.3 0.3  --  0.3   ALKPHOS 67 73  --  71   AST 37 39  --  34   ALT 17 19  --  16   ANIONGAP 16 16 15 14   ESTGFRAFRICA 6* 6* 6* 6*   EGFRNONAA 5* 5* 6* 6*    and CBC   Recent Labs  Lab 06/14/18  1807 06/15/18  0815 06/16/18  0423   WBC 6.99 5.66 5.05   HGB 8.1* 8.4* 7.8*   HCT 25.3* 26.1* 24.1*    290 287       Pending Diagnostic Studies:     None         Medications:  Reconciled Home Medications:      Medication List      CONTINUE taking these medications    albuterol 90 mcg/actuation inhaler  Inhale 2 puffs into the lungs every 6 (six) hours as needed for Wheezing.     B COMPLEX Tbsr  Generic drug:  vitamin B complex  Take by mouth. 1 Tablet Extended Release Oral Every day     CALCIUM 500 + D (D3) ORAL  Take by mouth. 1 Tablet Oral Twice a day     cloNIDine 0.3 MG tablet  Commonly known as:  CATAPRES  take 1 tablet by mouth twice a day     diltiaZEM 180 MG 24 hr capsule  Commonly known as:  CARDIZEM CD  take 1 capsule by mouth once daily     docusate sodium 100 MG capsule  Commonly known as:  COLACE  Take 1 capsule (100 mg total) by mouth 2 (two) times daily as needed for Constipation.     furosemide 20 MG tablet  Commonly known as:  LASIX  Take 1 tablet (20 mg total) by mouth daily as needed.     hydrALAZINE 50 MG tablet  Commonly known as:  APRESOLINE  Take 50 mg by mouth.     polyethylene glycol 17 gram Pwpk  Commonly known as:  GLYCOLAX  TAKE 17 GRAMS WITH WATER BY MOUTH DAILY      senna-docusate 8.6-50 mg 8.6-50 mg per tablet  Commonly known as:  PERICOLACE  Take by mouth.     sodium bicarbonate 650 MG tablet  Take 1 tablet (650 mg total) by mouth 3 (three) times daily.            Indwelling Lines/Drains at time of discharge:   Lines/Drains/Airways          No matching active lines, drains, or airways          Time spent on the discharge of patient: 40 minutes  Patient was seen and examined on the date of discharge and determined to be suitable for discharge.         Kyle Hawkins NP  Department of Hospital Medicine  [facility]

## 2018-06-16 NOTE — PLAN OF CARE
Consult received for hospice information.  CM met with patient/daughter (Lakesha) to assess for discharge needs.  Patient lives at home with her .  Her  still works.  CM provided brochures from several local hospice companies.  Lakesha does not want an information visit during this hospital stay because she has to discuss this with other family members.  CM offered home health, however that service was declined at this time.  No other needs at this time.     06/16/18 1334   Discharge Assessment   Assessment Type Discharge Planning Assessment   Confirmed/corrected address and phone number on facesheet? Yes   Assessment information obtained from? Patient;Medical Record;Caregiver   Expected Length of Stay (days) 2   Communicated expected length of stay with patient/caregiver yes   Prior to hospitilization cognitive status: Alert/Oriented   Prior to hospitalization functional status: Assistive Equipment   Current cognitive status: Alert/Oriented   Current Functional Status: Needs Assistance   Lives With spouse   Able to Return to Prior Arrangements yes   Is patient able to care for self after discharge? Unable to determine at this time (comments)   Who are your caregiver(s) and their phone number(s)? Lakesha Wright, daughter [phone]   Patient's perception of discharge disposition home or selfcare   Readmission Within The Last 30 Days no previous admission in last 30 days   Patient currently being followed by outpatient case management? No   Patient currently receives any other outside agency services? No   Equipment Currently Used at Home walker, rolling;shower chair;cane, straight   Do you have any problems affording any of your prescribed medications? No   Is the patient taking medications as prescribed? yes   Does the patient have transportation home? Yes   Transportation Available family or friend will provide   Dialysis Name and Scheduled days NA   Does the patient receive services at the  [facility]? (NA)   Discharge Plan A Home with family   Patient/Family In Agreement With Plan yes

## 2018-06-16 NOTE — ASSESSMENT & PLAN NOTE
1. SEGUNDO on CKD stage 5 : baseline serum cr is about 5 mg/dl, renal fn improved with hydration ,     Pt has a baseline serum cr of about 5 and GFR less than 10 , some of her symtoms likely uremia , she is not willing to go on RRT ,     Briefly discussed hospice care with pt and daughter ,     2. HTN : BP controlled, follow    3. Hyperkalemia : improved     4. Anemia of CKD : f/u with hematology on discharge     5. Met acidosis : cont bicarb supplements     6. Repeat urine cx , recent UTI , pending , pt asymptomatic     7. Ok d/c home and f/u in clinic

## 2018-06-16 NOTE — PROGRESS NOTES
[facility]   Nephrology  Progress Note    Patient Name: Gloria Sanz  MRN: [id number]  Admission Date: 6/14/2018  Hospital Length of Stay: 1 days  Attending Provider: Salvador Bender MD   Primary Care Physician: Derrick Desouza MD  Principal Problem:Acute renal failure superimposed on stage 5 chronic kidney disease, not on chronic dialysis    Subjective:     HPI: Gloria Sanz is a pleasant 86 y.o. AA woman with history of HTN, gout, osteoarthritis, anemia, asthma, pulmon. HTN, CKD stage 5 ( baseline serum cr about 5 ) was admitted to hospital yesterday for SEGUNDO and malaise, she was recently treated for a UTI , according to the dtr pt has been not eating well and drinking enough fluids, renal fn is improving with hydration ,      Interval History:  Feels better     Review of patient's allergies indicates:   Allergen Reactions    Allopurinol analogues Rash and Other (See Comments)     Sores in mouth  Sores in mouth  Sores in mouth    Diflucan [fluconazole] Swelling    Amlodipine Edema and Swelling    Ace inhibitors Nausea And Vomiting    Metronidazole Nausea And Vomiting    Phenytoin sodium extended Nausea And Vomiting     Current Facility-Administered Medications   Medication Frequency    cloNIDine tablet 0.3 mg BID    dextrose 50% injection 12.5 g PRN    dextrose 50% injection 25 g PRN    diltiaZEM 24 hr capsule 180 mg Daily    glucagon (human recombinant) injection 1 mg PRN    glucose chewable tablet 16 g PRN    glucose chewable tablet 24 g PRN    heparin (porcine) injection 5,000 Units Q8H    hydrALAZINE tablet 50 mg Q8H    pantoprazole EC tablet 40 mg Daily    sodium bicarbonate tablet 650 mg TID    sodium chloride 0.45% 1,000 mL with sodium bicarbonate 1 mEq/mL (8.4 %) 75 mEq infusion Continuous    sodium chloride 0.9% flush 5 mL PRN       Objective:     Vital Signs (Most Recent):  Temp: 98.8 °F (37.1 °C) (06/16/18 1109)  Pulse: 74 (06/16/18 1109)  Resp: 16 (06/16/18  1109)  BP: 125/68 (06/16/18 1109)  SpO2: 100 % (06/16/18 1109)  O2 Device (Oxygen Therapy): room air (06/16/18 1109) Vital Signs (24h Range):  Temp:  [97.7 °F (36.5 °C)-98.8 °F (37.1 °C)] 98.8 °F (37.1 °C)  Pulse:  [74-97] 74  Resp:  [16-19] 16  SpO2:  [97 %-100 %] 100 %  BP: (125-137)/(63-83) 125/68     Weight: 65.2 kg (143 lb 11.8 oz) (06/16/18 0755)  Body mass index is 26.29 kg/m².  Body surface area is 1.69 meters squared.    I/O last 3 completed shifts:  In: 2960 [P.O.:960; I.V.:1000; IV Piggyback:1000]  Out: 1400 [Urine:1400]    Physical Exam   Constitutional: She appears well-developed. No distress.   Frail looking   HENT:   Head: Normocephalic and atraumatic.   Mouth/Throat: Oropharynx is clear and moist. No oropharyngeal exudate.   Eyes: Conjunctivae and EOM are normal. Pupils are equal, round, and reactive to light.   Neck: Normal range of motion. Neck supple. No JVD present. Carotid bruit is not present. No tracheal deviation present. No thyroid mass and no thyromegaly present.   Cardiovascular: Normal rate, regular rhythm, normal heart sounds and intact distal pulses.  Exam reveals no gallop and no friction rub.    No murmur heard.  Pulmonary/Chest: No respiratory distress. She has no wheezes. She has rales. She exhibits no tenderness.   Abdominal: Soft. Bowel sounds are normal. She exhibits no distension, no abdominal bruit, no ascites and no mass. There is no hepatosplenomegaly. There is no tenderness. There is no rebound, no guarding and no CVA tenderness.   Musculoskeletal: Normal range of motion. She exhibits no edema or tenderness.   Lymphadenopathy:     She has no cervical adenopathy.   Neurological: She is alert. No cranial nerve deficit. She exhibits normal muscle tone. Coordination normal.   Skin: Skin is warm and intact. No rash noted. No erythema. No pallor.   Psychiatric: She has a normal mood and affect.       Significant Labs:  CBC:   Recent Labs  Lab 06/16/18  0423   WBC 5.05   RBC 3.44*    HGB 7.8*   HCT 24.1*      MCV 70*   MCH 22.7*   MCHC 32.4     CMP:   Recent Labs  Lab 06/16/18  0423   GLU 93   CALCIUM 9.0   ALBUMIN 2.9*   PROT 7.9      K 4.7   CO2 17*      BUN 99*   CREATININE 6.2*   ALKPHOS 71   ALT 16   AST 34   BILITOT 0.3     Coagulation:   Recent Labs  Lab 06/14/18  1807   INR 1.0   APTT <21.0     LFTs:   Recent Labs  Lab 06/16/18  0423   ALT 16   AST 34   ALKPHOS 71   BILITOT 0.3   PROT 7.9   ALBUMIN 2.9*     All labs within the past 24 hours have been reviewed.     Significant Imaginreviewed reviewed     Assessment/Plan:     * Acute renal failure superimposed on stage 5 chronic kidney disease, not on chronic dialysis    1. SEGUNDO on CKD stage 5 : baseline serum cr is about 5 mg/dl, renal fn improved with hydration ,     Pt has a baseline serum cr of about 5 and GFR less than 10 , some of her symtoms likely uremia , she is not willing to go on RRT ,     Briefly discussed hospice care with pt and daughter ,     2. HTN : BP controlled, follow    3. Hyperkalemia : improved     4. Anemia of CKD : f/u with hematology on discharge     5. Met acidosis : cont bicarb supplements     6. Repeat urine cx , recent UTI , pending , pt asymptomatic     7. Ok d/c home and f/u in clinic                  I will follow-up with patient. Please contact us if you have any additional questions.     Total time spent 30 minutes including time needed to review the records,  patient  evaluation, documentation, face-to-face discussion with the patient,  dtr and primary team ,   more than 50% of the time was spent on coordination of care and counseling.       Murali Fofana MD  Nephrology  [facility]

## 2018-06-21 ENCOUNTER — HOSPITAL ENCOUNTER (EMERGENCY)
Facility: HOSPITAL | Age: 83
Discharge: HOME | End: 2018-06-21
Attending: EMERGENCY MEDICINE
Payer: COMMERCIAL

## 2018-06-21 ENCOUNTER — OFFICE VISIT (OUTPATIENT)
Dept: HEMATOLOGY/ONCOLOGY | Facility: CLINIC | Age: 83
End: 2018-06-21
Payer: COMMERCIAL

## 2018-06-21 VITALS
TEMPERATURE: 98 F | HEART RATE: 97 BPM | DIASTOLIC BLOOD PRESSURE: 103 MMHG | WEIGHT: 144 LBS | OXYGEN SATURATION: 100 % | SYSTOLIC BLOOD PRESSURE: 202 MMHG | RESPIRATION RATE: 20 BRPM | BODY MASS INDEX: 26.34 KG/M2

## 2018-06-21 VITALS
TEMPERATURE: 98 F | WEIGHT: 140.63 LBS | HEART RATE: 92 BPM | RESPIRATION RATE: 36 BRPM | BODY MASS INDEX: 25.88 KG/M2 | DIASTOLIC BLOOD PRESSURE: 90 MMHG | HEIGHT: 62 IN | OXYGEN SATURATION: 100 % | SYSTOLIC BLOOD PRESSURE: 164 MMHG

## 2018-06-21 DIAGNOSIS — R06.02 SHORTNESS OF BREATH: ICD-10-CM

## 2018-06-21 DIAGNOSIS — D63.1 ANEMIA OF CHRONIC RENAL FAILURE, STAGE 4 (SEVERE): Primary | ICD-10-CM

## 2018-06-21 DIAGNOSIS — N18.4 ANEMIA OF CHRONIC RENAL FAILURE, STAGE 4 (SEVERE): Primary | ICD-10-CM

## 2018-06-21 DIAGNOSIS — N18.6 ESRD (END STAGE RENAL DISEASE): ICD-10-CM

## 2018-06-21 DIAGNOSIS — R06.00 DYSPNEA: ICD-10-CM

## 2018-06-21 DIAGNOSIS — I10 ESSENTIAL HYPERTENSION: ICD-10-CM

## 2018-06-21 DIAGNOSIS — E87.20 ACIDOSIS: Primary | ICD-10-CM

## 2018-06-21 LAB
ALBUMIN SERPL BCP-MCNC: 3.4 G/DL
ALLENS TEST: ABNORMAL
ALP SERPL-CCNC: 75 U/L
ALT SERPL W/O P-5'-P-CCNC: 29 U/L
ANION GAP SERPL CALC-SCNC: 17 MMOL/L
ANISOCYTOSIS BLD QL SMEAR: ABNORMAL
APTT BLDCRRT: 33.1 SEC
AST SERPL-CCNC: 57 U/L
BACTERIA #/AREA URNS HPF: ABNORMAL /HPF
BASOPHILS # BLD AUTO: 0.02 K/UL
BASOPHILS NFR BLD: 0.3 %
BILIRUB SERPL-MCNC: 0.4 MG/DL
BILIRUB UR QL STRIP: NEGATIVE
BNP SERPL-MCNC: 177 PG/ML
BUN SERPL-MCNC: 81 MG/DL
CALCIUM SERPL-MCNC: 9.9 MG/DL
CHLORIDE SERPL-SCNC: 107 MMOL/L
CK MB SERPL-MCNC: 2.5 NG/ML
CK MB SERPL-RTO: 2.8 %
CK SERPL-CCNC: 89 U/L
CK SERPL-CCNC: 89 U/L
CLARITY UR: ABNORMAL
CO2 SERPL-SCNC: 13 MMOL/L
COLOR UR: YELLOW
CREAT SERPL-MCNC: 5.8 MG/DL
DELSYS: ABNORMAL
DIFFERENTIAL METHOD: ABNORMAL
EOSINOPHIL # BLD AUTO: 0.2 K/UL
EOSINOPHIL NFR BLD: 3 %
ERYTHROCYTE [DISTWIDTH] IN BLOOD BY AUTOMATED COUNT: 20.1 %
EST. GFR  (AFRICAN AMERICAN): 7 ML/MIN/1.73 M^2
EST. GFR  (NON AFRICAN AMERICAN): 6 ML/MIN/1.73 M^2
GLUCOSE SERPL-MCNC: 79 MG/DL
GLUCOSE UR QL STRIP: NEGATIVE
HCO3 UR-SCNC: 15.4 MMOL/L (ref 24–28)
HCT VFR BLD AUTO: 24.6 %
HGB BLD-MCNC: 8.1 G/DL
HGB UR QL STRIP: NEGATIVE
HYALINE CASTS #/AREA URNS LPF: 0 /LPF
HYPOCHROMIA BLD QL SMEAR: ABNORMAL
INR PPP: 1
KETONES UR QL STRIP: NEGATIVE
LEUKOCYTE ESTERASE UR QL STRIP: ABNORMAL
LYMPHOCYTES # BLD AUTO: 1.6 K/UL
LYMPHOCYTES NFR BLD: 21.2 %
MAGNESIUM SERPL-MCNC: 1.5 MG/DL
MCH RBC QN AUTO: 22.1 PG
MCHC RBC AUTO-ENTMCNC: 32.9 G/DL
MCV RBC AUTO: 67 FL
MICROSCOPIC COMMENT: ABNORMAL
MODE: ABNORMAL
MONOCYTES # BLD AUTO: 1.1 K/UL
MONOCYTES NFR BLD: 15.1 %
NEUTROPHILS # BLD AUTO: 4.4 K/UL
NEUTROPHILS NFR BLD: 60.7 %
NITRITE UR QL STRIP: NEGATIVE
OVALOCYTES BLD QL SMEAR: ABNORMAL
PCO2 BLDA: 27.2 MMHG (ref 35–45)
PH SMN: 7.36 [PH] (ref 7.35–7.45)
PH UR STRIP: 7 [PH] (ref 5–8)
PHOSPHATE SERPL-MCNC: 3.7 MG/DL
PLATELET # BLD AUTO: 218 K/UL
PLATELET BLD QL SMEAR: ABNORMAL
PMV BLD AUTO: 9.1 FL
PO2 BLDA: 41 MMHG (ref 40–60)
POC BE: -10 MMOL/L
POC SATURATED O2: 76 % (ref 95–100)
POIKILOCYTOSIS BLD QL SMEAR: SLIGHT
POTASSIUM SERPL-SCNC: 4.4 MMOL/L
PROT SERPL-MCNC: 9.1 G/DL
PROT UR QL STRIP: ABNORMAL
PROTHROMBIN TIME: 10.2 SEC
RBC # BLD AUTO: 3.67 M/UL
RBC #/AREA URNS HPF: 6 /HPF (ref 0–4)
SAMPLE: ABNORMAL
SCHISTOCYTES BLD QL SMEAR: PRESENT
SITE: ABNORMAL
SODIUM SERPL-SCNC: 137 MMOL/L
SP GR UR STRIP: 1.01 (ref 1–1.03)
SPHEROCYTES BLD QL SMEAR: ABNORMAL
SQUAMOUS #/AREA URNS HPF: 2 /HPF
STOMATOCYTES BLD QL SMEAR: PRESENT
TARGETS BLD QL SMEAR: ABNORMAL
TROPONIN I SERPL DL<=0.01 NG/ML-MCNC: 0.02 NG/ML
URN SPEC COLLECT METH UR: ABNORMAL
UROBILINOGEN UR STRIP-ACNC: NEGATIVE EU/DL
WBC # BLD AUTO: 7.35 K/UL
WBC #/AREA URNS HPF: 3 /HPF (ref 0–5)

## 2018-06-21 PROCEDURE — 82550 ASSAY OF CK (CPK): CPT

## 2018-06-21 PROCEDURE — 99900035 HC TECH TIME PER 15 MIN (STAT)

## 2018-06-21 PROCEDURE — 83735 ASSAY OF MAGNESIUM: CPT

## 2018-06-21 PROCEDURE — 84484 ASSAY OF TROPONIN QUANT: CPT

## 2018-06-21 PROCEDURE — 99999 PR PBB SHADOW E&M-EST. PATIENT-LVL III: CPT | Mod: PBBFAC,,, | Performed by: INTERNAL MEDICINE

## 2018-06-21 PROCEDURE — 85025 COMPLETE CBC W/AUTO DIFF WBC: CPT

## 2018-06-21 PROCEDURE — 82553 CREATINE MB FRACTION: CPT

## 2018-06-21 PROCEDURE — 82803 BLOOD GASES ANY COMBINATION: CPT

## 2018-06-21 PROCEDURE — 85610 PROTHROMBIN TIME: CPT

## 2018-06-21 PROCEDURE — 81000 URINALYSIS NONAUTO W/SCOPE: CPT

## 2018-06-21 PROCEDURE — 85730 THROMBOPLASTIN TIME PARTIAL: CPT

## 2018-06-21 PROCEDURE — 93005 ELECTROCARDIOGRAM TRACING: CPT

## 2018-06-21 PROCEDURE — 80053 COMPREHEN METABOLIC PANEL: CPT

## 2018-06-21 PROCEDURE — 99215 OFFICE O/P EST HI 40 MIN: CPT | Mod: S$GLB,,, | Performed by: INTERNAL MEDICINE

## 2018-06-21 PROCEDURE — 99285 EMERGENCY DEPT VISIT HI MDM: CPT | Mod: 25

## 2018-06-21 PROCEDURE — 83880 ASSAY OF NATRIURETIC PEPTIDE: CPT

## 2018-06-21 PROCEDURE — 84100 ASSAY OF PHOSPHORUS: CPT

## 2018-06-21 NOTE — ED PROVIDER NOTES
SCRIBE #1 NOTE: I, Susan Cuello, am scribing for, and in the presence of, Terrell Shabazz MD. I have scribed the entire note.      History      Chief Complaint   Patient presents with    Shortness of Breath       Review of patient's allergies indicates:   Allergen Reactions    Allopurinol analogues Rash and Other (See Comments)     Sores in mouth  Sores in mouth  Sores in mouth    Diflucan [fluconazole] Swelling    Amlodipine Edema and Swelling    Ace inhibitors Nausea And Vomiting    Metronidazole Nausea And Vomiting    Phenytoin sodium extended Nausea And Vomiting        HPI   HPI    6/21/2018, 4:01 PM   History obtained from the daughter and patient      History of Present Illness: Gloria Sanz is a 86 y.o. female patient who presents to the Emergency Department for progressive generalized malaise which onset gradually 2 weeks ago and worsened this AM. Pt visited Dr. Clark today and was sent to the ER for further evaluation. Symptoms are constant and moderate in severity. No mitigating or exacerbating factors reported. Associated sxs include SOB and chills. Patient denies any recent travel, long car rides, leg pain, fever, CP, abd pain, urinary frequency, urgency, N/V/D, diaphoresis, cough, congestion, and all other sxs at this time. Pt was seen in the ED 5 days ago due to dehydration. Pt has a PMHx of kidney failure and anemia. No further complaints or concerns at this time.     Arrival mode: [facility]    PCP: Derrick Desouza MD       Past Medical History:  Past Medical History:   Diagnosis Date    Anemia     sickle cell trait, alpha thalessemia    Anemia of chronic renal failure 5/12/2014    Asthma, chronic     Cataract     GERD (gastroesophageal reflux disease)     Gout, arthritis     Helicobacter pylori gastritis     Hypertension     Osteoarthritis     Pulmonary HTN     Renal insufficiency        Past Surgical History:  Past Surgical History:   Procedure Laterality Date    CHOLECYSTECTOMY       endometrial polyp      FETAL BLOOD TRANSFUSION  12/11/2015    2 pints    THYROID SURGERY           Family History:  Family History   Problem Relation Age of Onset    Liver cancer Mother     Cancer Mother     Liver cancer Sister     Breast cancer Sister     Cancer Sister     Glaucoma Sister     Thyroid disease Sister     Melanoma Neg Hx     Eczema Neg Hx     Lupus Neg Hx     Psoriasis Neg Hx        Social History:  Social History     Social History Main Topics    Smoking status: Never Smoker    Smokeless tobacco: Never Used    Alcohol use No    Drug use: No    Sexual activity: Unknown       ROS   Review of Systems   Constitutional: Positive for chills. Negative for diaphoresis and fever.        (+) generalized malaise  (-) recent travel  (-) long car rides   HENT: Negative for congestion, rhinorrhea and sore throat.    Respiratory: Positive for shortness of breath. Negative for cough and choking.    Cardiovascular: Negative for chest pain and leg swelling.   Gastrointestinal: Negative for diarrhea, nausea and vomiting.   Genitourinary: Negative for dysuria, frequency, hematuria and urgency.   Musculoskeletal: Negative for back pain.   Skin: Negative for rash.   Neurological: Negative for dizziness, weakness and light-headedness.   Hematological: Does not bruise/bleed easily.     Physical Exam      Initial Vitals [06/21/18 1530]   BP Pulse Resp Temp SpO2   (!) 190/100 86 (!) 24 98.1 °F (36.7 °C) 100 %      MAP       --          Physical Exam  Nursing Notes and Vital Signs Reviewed.  Constitutional: Patient is in mild distress. Pt is elderly.  Head: Atraumatic. Normocephalic.  Eyes: PERRL. EOM intact. Conjunctivae are pale. No scleral icterus.  ENT: Mucous membranes are moist. Oropharynx is clear and symmetric.    Neck: Supple. Full ROM. No lymphadenopathy.  Cardiovascular: Regular rate. Regular rhythm. No murmurs, rubs, or gallops. Distal pulses are 2+ and symmetric.  Pulmonary/Chest: No  respiratory distress. Clear to auscultation bilaterally. No wheezing or rales. Tachypneic.  Abdominal: Soft and non-distended.  There is no tenderness.  No rebound, guarding, or rigidity.  Musculoskeletal: Moves all extremities. No obvious deformities. No edema.  Skin: Warm and dry.  Neurological:  Alert, awake, and appropriate.  Normal speech.  No acute focal neurological deficits are appreciated.  Psychiatric: Normal affect. Good eye contact. Appropriate in content.    ED Course    Procedures  ED Vital Signs:  Vitals:    06/21/18 1530 06/21/18 1549 06/21/18 1554 06/21/18 1600   BP: (!) 190/100   (!) 184/107   Pulse: 86 91 92 97   Resp: (!) 24   (!) 22   Temp: 98.1 °F (36.7 °C)      TempSrc: Oral      SpO2: 100%   100%   Weight:    65.3 kg (144 lb)    06/21/18 1702 06/21/18 1720 06/21/18 1820 06/21/18 1901   BP: (!) 192/103 (!) 192/108 (!) 195/110 (!) 202/103   Pulse: 95 94 91 97   Resp: (!) 21 20 20 20   Temp:       TempSrc:       SpO2: 100% 100% 100% 100%   Weight:           Abnormal Lab Results:  Labs Reviewed   CBC W/ AUTO DIFFERENTIAL - Abnormal; Notable for the following:        Result Value    RBC 3.67 (*)     Hemoglobin 8.1 (*)     Hematocrit 24.6 (*)     MCV 67 (*)     MCH 22.1 (*)     RDW 20.1 (*)     MPV 9.1 (*)     Mono # 1.1 (*)     Mono% 15.1 (*)     All other components within normal limits   COMPREHENSIVE METABOLIC PANEL - Abnormal; Notable for the following:     CO2 13 (*)     BUN, Bld 81 (*)     Creatinine 5.8 (*)     Total Protein 9.1 (*)     Albumin 3.4 (*)     AST 57 (*)     Anion Gap 17 (*)     eGFR if  7 (*)     eGFR if non  6 (*)     All other components within normal limits   MAGNESIUM - Abnormal; Notable for the following:     Magnesium 1.5 (*)     All other components within normal limits   APTT - Abnormal; Notable for the following:     aPTT 33.1 (*)     All other components within normal limits   URINALYSIS - Abnormal; Notable for the following:      Appearance, UA Hazy (*)     Protein, UA 2+ (*)     Leukocytes, UA 2+ (*)     All other components within normal limits   B-TYPE NATRIURETIC PEPTIDE - Abnormal; Notable for the following:      (*)     All other components within normal limits   URINALYSIS MICROSCOPIC - Abnormal; Notable for the following:     RBC, UA 6 (*)     All other components within normal limits   ISTAT PROCEDURE - Abnormal; Notable for the following:     POC PCO2 27.2 (*)     POC HCO3 15.4 (*)     POC SATURATED O2 76 (*)     All other components within normal limits   PHOSPHORUS   PROTIME-INR   CK-MB   CK   TROPONIN I        All Lab Results:  Results for orders placed or performed during the hospital encounter of 06/21/18   CBC auto differential   Result Value Ref Range    WBC 7.35 3.90 - 12.70 K/uL    RBC 3.67 (L) 4.00 - 5.40 M/uL    Hemoglobin 8.1 (L) 12.0 - 16.0 g/dL    Hematocrit 24.6 (L) 37.0 - 48.5 %    MCV 67 (L) 82 - 98 fL    MCH 22.1 (L) 27.0 - 31.0 pg    MCHC 32.9 32.0 - 36.0 g/dL    RDW 20.1 (H) 11.5 - 14.5 %    Platelets 218 150 - 350 K/uL    MPV 9.1 (L) 9.2 - 12.9 fL    Gran # (ANC) 4.4 1.8 - 7.7 K/uL    Lymph # 1.6 1.0 - 4.8 K/uL    Mono # 1.1 (H) 0.3 - 1.0 K/uL    Eos # 0.2 0.0 - 0.5 K/uL    Baso # 0.02 0.00 - 0.20 K/uL    Gran% 60.7 38.0 - 73.0 %    Lymph% 21.2 18.0 - 48.0 %    Mono% 15.1 (H) 4.0 - 15.0 %    Eosinophil% 3.0 0.0 - 8.0 %    Basophil% 0.3 0.0 - 1.9 %    Platelet Estimate Appears normal     Aniso Moderate     Poik Slight     Hypo Moderate     Ovalocytes Occasional     Target Cells Moderate     Stomatocytes Present     Spherocytes Occasional     Schistocytes Present     Differential Method Automated    Comprehensive metabolic panel   Result Value Ref Range    Sodium 137 136 - 145 mmol/L    Potassium 4.4 3.5 - 5.1 mmol/L    Chloride 107 95 - 110 mmol/L    CO2 13 (L) 23 - 29 mmol/L    Glucose 79 70 - 110 mg/dL    BUN, Bld 81 (H) 8 - 23 mg/dL    Creatinine 5.8 (H) 0.5 - 1.4 mg/dL    Calcium 9.9 8.7 - 10.5 mg/dL     Total Protein 9.1 (H) 6.0 - 8.4 g/dL    Albumin 3.4 (L) 3.5 - 5.2 g/dL    Total Bilirubin 0.4 0.1 - 1.0 mg/dL    Alkaline Phosphatase 75 55 - 135 U/L    AST 57 (H) 10 - 40 U/L    ALT 29 10 - 44 U/L    Anion Gap 17 (H) 8 - 16 mmol/L    eGFR if African American 7 (A) >60 mL/min/1.73 m^2    eGFR if non African American 6 (A) >60 mL/min/1.73 m^2   Magnesium   Result Value Ref Range    Magnesium 1.5 (L) 1.6 - 2.6 mg/dL   Phosphorus   Result Value Ref Range    Phosphorus 3.7 2.7 - 4.5 mg/dL   Protime-INR   Result Value Ref Range    Prothrombin Time 10.2 9.0 - 12.5 sec    INR 1.0 0.8 - 1.2   APTT   Result Value Ref Range    aPTT 33.1 (H) 21.0 - 32.0 sec   Urinalysis   Result Value Ref Range    Specimen UA Urine, Clean Catch     Color, UA Yellow Yellow, Straw, Fabiana    Appearance, UA Hazy (A) Clear    pH, UA 7.0 5.0 - 8.0    Specific Gravity, UA 1.010 1.005 - 1.030    Protein, UA 2+ (A) Negative    Glucose, UA Negative Negative    Ketones, UA Negative Negative    Bilirubin (UA) Negative Negative    Occult Blood UA Negative Negative    Nitrite, UA Negative Negative    Urobilinogen, UA Negative <2.0 EU/dL    Leukocytes, UA 2+ (A) Negative   CK-MB   Result Value Ref Range    CPK 89 20 - 180 U/L    CPK MB 2.5 0.1 - 6.5 ng/mL    MB% 2.8 0.0 - 5.0 %   CK   Result Value Ref Range    CPK 89 20 - 180 U/L   Troponin I   Result Value Ref Range    Troponin I 0.024 0.000 - 0.026 ng/mL   Brain natriuretic peptide   Result Value Ref Range     (H) 0 - 99 pg/mL   Urinalysis Microscopic   Result Value Ref Range    RBC, UA 6 (H) 0 - 4 /hpf    WBC, UA 3 0 - 5 /hpf    Bacteria, UA Occasional None-Occ /hpf    Squam Epithel, UA 2 /hpf    Hyaline Casts, UA 0 0-1/lpf /lpf    Microscopic Comment SEE COMMENT    ISTAT PROCEDURE   Result Value Ref Range    POC PH 7.361 7.35 - 7.45    POC PCO2 27.2 (L) 35 - 45 mmHg    POC PO2 41 40 - 60 mmHg    POC HCO3 15.4 (L) 24 - 28 mmol/L    POC BE -10 -2 to 2 mmol/L    POC SATURATED O2 76 (L) 95 - 100 %     Sample VENOUS     Site Other     Allens Test N/A     DelSys Room Air     Mode SPONT        Imaging Results:  Imaging Results          NM Lung Ventilation Perfusion Imaging (Final result)  Result time 06/21/18 18:19:01    Final result by Tobias Da Silva MD (06/21/18 18:19:01)                 Impression:      Negative VQ scan. No evidence of pulmonary embolism.      Electronically signed by: Tobias Da Silva MD  Date:    06/21/2018  Time:    18:19             Narrative:    EXAMINATION:  NM LUNG VENTILATION AND PERFUSION IMAGING    CLINICAL HISTORY:  sob;    TECHNIQUE:  Ventilation study performed with 1 mCi technetium 99 M DTPA and perfusion study performed with 6 mCi technetium 99 M MAA.    COMPARISON:  None    FINDINGS:  The initial breath is grossly normal.    The perfusion study is normal.                               X-Ray Chest 1 View (Final result)  Result time 06/21/18 16:31:34    Final result by CARLO Lincoln Sr., MD (06/21/18 16:31:34)                 Impression:      1. The lungs are clear.  2. The size of the heart is prominent.  This may be secondary to magnification.  3. A surgical clip is projected over the superior aspect of the left hemithorax.  .      Electronically signed by: Jack Lincoln MD  Date:    06/21/2018  Time:    16:31             Narrative:    EXAMINATION:  XR CHEST 1 VIEW    CLINICAL HISTORY:  sob;    COMPARISON:  05/24/2018    FINDINGS:  The size of the heart is prominent.  The lungs are clear. There is no pneumothorax.  The costophrenic angles are sharp.  A surgical clip is projected over the superior aspect of the left hemithorax.                                        The EKG was ordered, reviewed, and independently interpreted by the ED provider.  Interpretation time: 1536  Rate: 83 BPM  Rhythm: Sinus rhythm with 1st degree AV block  Interpretation: Left axis deviation. Voltage criteria for left ventricular hypertrophy. Abnormal ECG. No STEMI.    The Emergency  Provider reviewed the vital signs and test results, which are outlined above.    ED Discussion     7:07 PM: Reassessed pt at this time.  Pt states her condition has improved at this time. Discussed aspect of pt's end stage renal disease with acidosis as cause of her condition of increased resp effort that has now resolved. Discussed pt dx of ESRD and all tx options.Decision made by patient and family for pt to f/u with hospice for at home evaluation in AM. Daughter says she has multiple hospice resources already. All questions and concerns were addressed at this time. Pt expresses understanding of information and instructions, and is comfortable with plan to discharge. Pt is stable for discharge.    I discussed with patient and/or family/caretaker that evaluation in the ED does not suggest any emergent or life threatening medical conditions requiring immediate intervention beyond what was provided in the ED, and I believe patient is safe for discharge.  Regardless, an unremarkable evaluation in the ED does not preclude the development or presence of a serious of life threatening condition. As such, patient was instructed to return immediately for any worsening or change in current symptoms.    ED Medication(s):  Medications - No data to display    Discharge Medication List as of 6/21/2018  7:20 PM          Follow-up Information     Hospice In 1 day.           Derrick Desouza MD In 1 day.    Specialty:  Family Medicine  Contact information:  [address] [facility] [address] [phone]             [facility]    Specialty:  Emergency Medicine  Why:  If symptoms worsen  Contact information:  [address] [facility] [address] [phone]                   Medical Decision Making    Medical Decision Making:   Clinical Tests:   Lab Tests: Reviewed and Ordered  Radiological Study: Reviewed and Ordered  Medical Tests: Reviewed and Ordered           Scribe Attestation:    Scribe #1: I performed the above scribed service and the documentation accurately describes the services I performed. I attest to the accuracy of the note.    Attending:   Physician Attestation Statement for Scribe #1: I, Terrell Shabazz MD, personally performed the services described in this documentation, as scribed by Susan Cuello, in my presence, and it is both accurate and complete.          Clinical Impression       ICD-10-CM ICD-9-CM   1. Acidosis E87.2 276.2   2. Dyspnea R06.00 786.09   3. ESRD (end stage renal disease) N18.6 585.6   4. Essential hypertension I10 401.9       Disposition:   Disposition: Discharged  Condition: Stable         Terrell Shabazz MD  06/22/18 0128

## 2018-06-21 NOTE — PROGRESS NOTES
Subjective:       Patient ID: Gloria Sanz is a 86 y.o. female.    Chief Complaint: Follow-up    HPI This 86 year old AA lady comes for follow uo her anemia associated to chronic renal failure.  She is known to us because in MAy 2010 had a cbc that showed a hemoglobin of 10.6 with an MCV of 79.4. White cell count was 4,380 (differential included 17% monocytes) and a platelet count of 217,000 Review of information in the computer indicated that the patient has had mild anemia dating back a number of years\.    In 1991, her hemoglobin was 11.7 with an MCV of 84. Her MCV through the years has been anywhere from 79-84. The patient has had several iron studies that showed an elevated ferritin, a creatinine of 1.7 as well as serum protein electrophoresis that has failed to show a monoclonal spike.    . B12 levels have been normal.    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    The patient comes today for routine follow up.    At the end dec 2011 she had a severe epistaxis requiring a blood transfusion. She ended up having surgery for nasal polyps In May 2012 she ha had a drop of hr HGB to 8.8 and her creatinine was 1.8 She slowly improved ion her own. SPEp showed a polyclonal gammopathy and her free light chains showed elevations of both the kappa and lambda light chains which goes against clonality.       She had the zoster vaccine in 2008    She is on Procrit every 2 weeks when needed    She is know to have a sub-centimeter pulmonary nodule in the right middle lobe, Ct scan in December 2015 showed stability.A CT scan done 3/1027 showed a new nodule, measuring 4.6 mm in the right lung.  She had a Ct scan in early April 2018 which showed stability of thee lung nodules but a new  Probable 3.1 cm lesion in the  lefy kidney. She had an US in 4/2018  that showed multiple cysts  but no solid masses.  She has recurrent epistaxis which is felt to be due to HBP     She comes for follow up to see if she will need procrit.   She was just discharged from the Hospital after he was found to be dehydrated,  The patint is maekedly SOB on the examining room, breathing 40 times a minute. Oxygen saturation is 100&. She is slightly acidotic  at 17, but the respiratory effort seems outt of proportion to a bicarbonate of 17     ALLERGIES: See med card  MEDICATIONS: See MedCard.  PREVIOUS SURGERIES: Thyroid nodule removed many years ago and endometrial  polyp removed in 2009.  Nasal polyps surgery around april 2012  SOCIAL HISTORY: She is  with three children. She lives in [place].  She does not smoke or drink. 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  FAMILY HISTORY: Two sisters have liver cancer, and another one has breast  cancer. Mother had cancer of the gallbladder. Father had a heart attack.  No diabetes in the family.  PAST MEDICAL HISTORY:  1. 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  2. Alpha globin gene rearrangement test showed that she is an alpha  thalassemia carrier.  3. Chronic anemia.  4-elevated creatinine  5. Hypertension.  6. Status post removal of endometrial polyp.  7. Epistaxis-recurrent felt to be due to HBP.  8. hx of epistaxis Dec 2011 ( nasal polyps  8.renal cysts  Review of Systems   Constitutional: Negative.    HENT: Negative.    Eyes: Negative.    Respiratory: Negative.  Negative for cough and wheezing.    Cardiovascular: Negative.  Negative for chest pain.   Gastrointestinal: Negative.    Genitourinary: Negative.    Neurological: Negative.    Psychiatric/Behavioral: Negative.        Objective:      Physical Exam   Constitutional: She is oriented to person, place, and time. She appears well-developed. No distress.   HENT:   Head: Normocephalic.   Right Ear: Tympanic membrane, external ear and ear canal normal.    Left Ear: Tympanic membrane, external ear and ear canal normal.   Nose: Nose normal. Right sinus exhibits no maxillary sinus tenderness and no frontal sinus tenderness. Left sinus exhibits no maxillary sinus tenderness and no frontal sinus tenderness.   Mouth/Throat: Oropharynx is clear and moist and mucous membranes are normal.   Teeth normal.  Gums normal.   Eyes: Conjunctivae and lids are normal. Pupils are equal, round, and reactive to light.   Neck: Normal carotid pulses, no hepatojugular reflux and no JVD present. Carotid bruit is not present. No tracheal deviation present. No thyroid mass and no thyromegaly present.   Cardiovascular: Normal rate, regular rhythm, S1 normal, S2 normal, normal heart sounds and intact distal pulses.  Exam reveals no gallop and no friction rub.    No murmur heard.  Carotid exam normal   Pulmonary/Chest: Effort normal and breath sounds normal. No accessory muscle usage. No respiratory distress. She has no wheezes. She has no rales. She exhibits no tenderness.   Abdominal: Soft. Normal appearance. She exhibits no distension and no mass. There is no splenomegaly or hepatomegaly. There is no tenderness. There is no rebound and no guarding.   Musculoskeletal: Normal range of motion. She exhibits no edema or tenderness.        Right hand: Normal.        Left hand: Normal.       Lymphadenopathy:     She has no cervical adenopathy.     She has no axillary adenopathy.        Right: No inguinal and no supraclavicular adenopathy present.        Left: No inguinal and no supraclavicular adenopathy present.   Neurological: She is alert and oriented to person, place, and time. She has normal strength. No cranial nerve deficit. Coordination normal.   Skin: Skin is warm and dry. No rash noted. She is not diaphoretic. No cyanosis or erythema. No pallor. Nails show no clubbing.   Psychiatric: She has a normal mood and affect. Her behavior is normal. Judgment and thought content normal.       Wt  Readings from Last 3 Encounters:   06/21/18 63.8 kg (140 lb 10.5 oz)   06/16/18 65.2 kg (143 lb 11.8 oz)   06/14/18 65.5 kg (144 lb 6.4 oz)     Temp Readings from Last 3 Encounters:   06/21/18 98.3 °F (36.8 °C) (Oral)   06/16/18 98.8 °F (37.1 °C) (Oral)   06/08/18 98.2 °F (36.8 °C) (Oral)     BP Readings from Last 3 Encounters:   06/21/18 (!) 164/90   06/16/18 125/68   06/14/18 (!) 144/82     Pulse Readings from Last 3 Encounters:   06/21/18 92   06/16/18 80   06/14/18 86       Assessment:       1. Shortness of breath      2-CRF  3-anemia CRF  Plan:       Lab Results   Component Value Date    WBC 5.05 06/16/2018    HGB 7.8 (L) 06/16/2018    HCT 24.1 (L) 06/16/2018    MCV 70 (L) 06/16/2018     06/16/2018       Lab Results   Component Value Date    CREATININE 6.2 (H) 06/16/2018     Lab Results   Component Value Date     06/16/2018    K 4.7 06/16/2018     06/16/2018    CO2 17 (L) 06/16/2018     Lab Results   Component Value Date    FERRITIN 175 03/15/2018        She is not wheezng, has no rales on exam. Will send to ER at [facility] ER by EMS.  Discussed with ER pjysician

## 2018-06-22 ENCOUNTER — TELEPHONE (OUTPATIENT)
Dept: HEMATOLOGY/ONCOLOGY | Facility: CLINIC | Age: 83
End: 2018-06-22

## 2018-06-22 ENCOUNTER — TELEPHONE (OUTPATIENT)
Dept: NEPHROLOGY | Facility: CLINIC | Age: 83
End: 2018-06-22

## 2018-06-22 ENCOUNTER — PES CALL (OUTPATIENT)
Dept: ADMINISTRATIVE | Facility: CLINIC | Age: 83
End: 2018-06-22

## 2018-06-22 NOTE — TELEPHONE ENCOUNTER
S/W pt's daughter about hospice for pt.  Informed we are working on it and will contact on Monday.  VB

## 2018-06-22 NOTE — TELEPHONE ENCOUNTER
----- Message from Bernadette Craft sent at 6/22/2018  4:26 PM CDT -----  Contact: daughter Lakesha  Calling concerning her mother's condition worsening and seeking to get her in to hospice care. Please call [facility] @ [phone]. Thanks, calvin

## 2018-06-22 NOTE — TELEPHONE ENCOUNTER
----- Message from Marcus Clark MD sent at 6/22/2018  6:54 AM CDT -----  It seems she was sent home yesterday.  Have hr come in Monday for hr Procrit. I need to see her too.  Thanks  Dr Clark

## 2018-06-23 ENCOUNTER — HOSPITAL ENCOUNTER (EMERGENCY)
Facility: HOSPITAL | Age: 83
Discharge: HOME | End: 2018-06-23
Attending: EMERGENCY MEDICINE
Payer: COMMERCIAL

## 2018-06-23 VITALS
HEART RATE: 95 BPM | DIASTOLIC BLOOD PRESSURE: 72 MMHG | TEMPERATURE: 98 F | HEIGHT: 62 IN | OXYGEN SATURATION: 98 % | RESPIRATION RATE: 20 BRPM | BODY MASS INDEX: 26.68 KG/M2 | SYSTOLIC BLOOD PRESSURE: 147 MMHG | WEIGHT: 145 LBS

## 2018-06-23 DIAGNOSIS — K29.70 GASTRITIS, PRESENCE OF BLEEDING UNSPECIFIED, UNSPECIFIED CHRONICITY, UNSPECIFIED GASTRITIS TYPE: ICD-10-CM

## 2018-06-23 DIAGNOSIS — R11.10 VOMITING: Primary | ICD-10-CM

## 2018-06-23 DIAGNOSIS — N18.4 CKD (CHRONIC KIDNEY DISEASE) STAGE 4, GFR 15-29 ML/MIN: ICD-10-CM

## 2018-06-23 LAB
ALBUMIN SERPL BCP-MCNC: 3.4 G/DL
ALP SERPL-CCNC: 86 U/L
ALT SERPL W/O P-5'-P-CCNC: 37 U/L
ANION GAP SERPL CALC-SCNC: 20 MMOL/L
ANISOCYTOSIS BLD QL SMEAR: ABNORMAL
APTT BLDCRRT: 34.5 SEC
AST SERPL-CCNC: 68 U/L
BASOPHILS # BLD AUTO: 0.01 K/UL
BASOPHILS NFR BLD: 0.2 %
BILIRUB SERPL-MCNC: 0.4 MG/DL
BUN SERPL-MCNC: 78 MG/DL
CALCIUM SERPL-MCNC: 9.7 MG/DL
CHLORIDE SERPL-SCNC: 112 MMOL/L
CO2 SERPL-SCNC: 12 MMOL/L
CREAT SERPL-MCNC: 6.5 MG/DL
DIFFERENTIAL METHOD: ABNORMAL
EOSINOPHIL # BLD AUTO: 0.1 K/UL
EOSINOPHIL NFR BLD: 1.6 %
ERYTHROCYTE [DISTWIDTH] IN BLOOD BY AUTOMATED COUNT: 20.7 %
EST. GFR  (AFRICAN AMERICAN): 6 ML/MIN/1.73 M^2
EST. GFR  (NON AFRICAN AMERICAN): 5 ML/MIN/1.73 M^2
GLUCOSE SERPL-MCNC: 72 MG/DL
HCT VFR BLD AUTO: 25.7 %
HGB BLD-MCNC: 8.3 G/DL
HYPOCHROMIA BLD QL SMEAR: ABNORMAL
INR PPP: 1
LIPASE SERPL-CCNC: 306 U/L
LYMPHOCYTES # BLD AUTO: 1.2 K/UL
LYMPHOCYTES NFR BLD: 19.1 %
MCH RBC QN AUTO: 22.1 PG
MCHC RBC AUTO-ENTMCNC: 32.3 G/DL
MCV RBC AUTO: 69 FL
MONOCYTES # BLD AUTO: 0.8 K/UL
MONOCYTES NFR BLD: 12.5 %
NEUTROPHILS # BLD AUTO: 4.2 K/UL
NEUTROPHILS NFR BLD: 66.8 %
OVALOCYTES BLD QL SMEAR: ABNORMAL
PLATELET # BLD AUTO: 221 K/UL
PLATELET BLD QL SMEAR: ABNORMAL
PMV BLD AUTO: ABNORMAL FL
POIKILOCYTOSIS BLD QL SMEAR: ABNORMAL
POTASSIUM SERPL-SCNC: 4.6 MMOL/L
PROT SERPL-MCNC: 9.3 G/DL
PROTHROMBIN TIME: 10.5 SEC
RBC # BLD AUTO: 3.75 M/UL
SCHISTOCYTES BLD QL SMEAR: PRESENT
SODIUM SERPL-SCNC: 144 MMOL/L
SPHEROCYTES BLD QL SMEAR: ABNORMAL
TARGETS BLD QL SMEAR: ABNORMAL
WBC # BLD AUTO: 6.24 K/UL

## 2018-06-23 PROCEDURE — C9113 INJ PANTOPRAZOLE SODIUM, VIA: HCPCS | Performed by: EMERGENCY MEDICINE

## 2018-06-23 PROCEDURE — 85730 THROMBOPLASTIN TIME PARTIAL: CPT

## 2018-06-23 PROCEDURE — 83690 ASSAY OF LIPASE: CPT

## 2018-06-23 PROCEDURE — 25000003 PHARM REV CODE 250: Performed by: EMERGENCY MEDICINE

## 2018-06-23 PROCEDURE — 85610 PROTHROMBIN TIME: CPT

## 2018-06-23 PROCEDURE — 96365 THER/PROPH/DIAG IV INF INIT: CPT

## 2018-06-23 PROCEDURE — 96375 TX/PRO/DX INJ NEW DRUG ADDON: CPT

## 2018-06-23 PROCEDURE — 80053 COMPREHEN METABOLIC PANEL: CPT

## 2018-06-23 PROCEDURE — 99284 EMERGENCY DEPT VISIT MOD MDM: CPT | Mod: 25

## 2018-06-23 PROCEDURE — 85025 COMPLETE CBC W/AUTO DIFF WBC: CPT

## 2018-06-23 PROCEDURE — 63600175 PHARM REV CODE 636 W HCPCS: Performed by: EMERGENCY MEDICINE

## 2018-06-23 RX ORDER — ONDANSETRON 4 MG/1
4 TABLET, FILM COATED ORAL EVERY 8 HOURS PRN
Qty: 12 TABLET | Refills: 0 | Status: SHIPPED | OUTPATIENT
Start: 2018-06-23

## 2018-06-23 RX ORDER — ONDANSETRON 2 MG/ML
4 INJECTION INTRAMUSCULAR; INTRAVENOUS
Status: COMPLETED | OUTPATIENT
Start: 2018-06-23 | End: 2018-06-23

## 2018-06-23 RX ADMIN — DEXTROSE 40 MG: 50 INJECTION, SOLUTION INTRAVENOUS at 02:06

## 2018-06-23 RX ADMIN — ONDANSETRON 4 MG: 2 INJECTION INTRAMUSCULAR; INTRAVENOUS at 02:06

## 2018-06-23 NOTE — ED PROVIDER NOTES
"SCRIBE #1 NOTE: I, Elizabeth Jolie, am scribing for, and in the presence of, Joana Sullivan MD. I have scribed the entire note.      History      Chief Complaint   Patient presents with    Hematemesis     Pt family states, 'SHe started throwing up blood right before we came here."       Review of patient's allergies indicates:   Allergen Reactions    Allopurinol analogues Rash and Other (See Comments)     Sores in mouth  Sores in mouth  Sores in mouth    Diflucan [fluconazole] Swelling    Amlodipine Edema and Swelling    Ace inhibitors Nausea And Vomiting    Metronidazole Nausea And Vomiting    Phenytoin sodium extended Nausea And Vomiting        HPI   HPI    6/23/2018, 1:54 PM   History obtained from the patient and daughter      History of Present Illness: Gloria Sanz is a 86 y.o. female patient w/ a PMHx of GERD and HTN presents to the Emergency Department for hematemesis which onset suddenly PTA. Pt's daughter reports pt was vomiting last night, but had 1 episode of hematemesis today. Symptoms are episodic and moderate in severity. No mitigating or exacerbating factors reported. No associated sxs included. Patient denies any blood in stool, diarrhea, abdominal pain, CP, SOB, cough, fever, chills, headache, myalgias, and all other sxs at this time. No prior Tx included. Pt is not on blood thinners. Pt's daughter adds that pt has chronic renal failure, but refuses dialysis. Pt's daughter states pt is starting home hospice soon and paperwork was filled out today.  No further complaints or concerns at this time.         Arrival mode: Personal vehicle     PCP: Derrick Desouza MD       Past Medical History:  Past Medical History:   Diagnosis Date    Anemia     sickle cell trait, alpha thalessemia    Anemia of chronic renal failure 5/12/2014    Asthma, chronic     Cataract     GERD (gastroesophageal reflux disease)     Gout, arthritis     Helicobacter pylori gastritis     Hypertension     " Osteoarthritis     Pulmonary HTN     Renal insufficiency        Past Surgical History:  Past Surgical History:   Procedure Laterality Date    CHOLECYSTECTOMY      endometrial polyp      FETAL BLOOD TRANSFUSION  12/11/2015    2 pints    THYROID SURGERY           Family History:  Family History   Problem Relation Age of Onset    Liver cancer Mother     Cancer Mother     Liver cancer Sister     Breast cancer Sister     Cancer Sister     Glaucoma Sister     Thyroid disease Sister     Melanoma Neg Hx     Eczema Neg Hx     Lupus Neg Hx     Psoriasis Neg Hx        Social History:  Social History     Social History Main Topics    Smoking status: Never Smoker    Smokeless tobacco: Never Used    Alcohol use No    Drug use: No    Sexual activity: Unknown       ROS   Review of Systems   Constitutional: Negative for chills, diaphoresis, fatigue and fever.   HENT: Negative for congestion and sore throat.    Respiratory: Negative for cough and shortness of breath.    Cardiovascular: Negative for chest pain and leg swelling.   Gastrointestinal: Positive for vomiting (hematemesis). Negative for abdominal pain, blood in stool, diarrhea and nausea.   Genitourinary: Negative for dysuria and hematuria.   Musculoskeletal: Negative for back pain and myalgias.   Skin: Negative for rash.   Neurological: Negative for weakness, light-headedness and headaches.   Hematological: Does not bruise/bleed easily.       Physical Exam      Initial Vitals [06/23/18 1327]   BP Pulse Resp Temp SpO2   135/85 102 20 98.2 °F (36.8 °C) 98 %      MAP       --          Physical Exam  Nursing Notes and Vital Signs Reviewed.  Constitutional: Patient is in no acute distress. Elderly. Frail.  Head: Atraumatic. Normocephalic.  Eyes: PERRL. EOM intact. Conjunctivae are not pale. No scleral icterus.  ENT: Mucous membranes are moist. Oropharynx is clear and symmetric.    Neck: Supple. Full ROM. No lymphadenopathy.  Cardiovascular: Regular rate.  "Regular rhythm. No murmurs, rubs, or gallops. Distal pulses are 2+ and symmetric.  Pulmonary/Chest: No respiratory distress. Clear to auscultation bilaterally. No wheezing or rales.  Abdominal: Soft and non-distended.  There is no tenderness.  No rebound, guarding, or rigidity. Good bowel sounds.  Genitourinary: No CVA tenderness  Musculoskeletal: Moves all extremities. No obvious deformities. No edema. No calf tenderness.  Skin: Warm and dry.  Neurological:  Patient is alert and oriented to person and place, but not time; pt's daughter at bedside states this is pt's baseline mental status. Normal speech.  No acute focal neurological deficits are appreciated.  Psychiatric: Normal affect. Good eye contact. Appropriate in content.      ED Course    Procedures  ED Vital Signs:  Vitals:    06/23/18 1327 06/23/18 1339 06/23/18 1347 06/23/18 1702   BP: 135/85 (!) 145/84 (!) 140/84 (!) 147/72   Pulse: 102 100 97 95   Resp: 20      Temp: 98.2 °F (36.8 °C)      TempSrc: Oral      SpO2: 98% 100% 100% 98%   Weight: 65.8 kg (145 lb)      Height: 5' 2" (1.575 m)          Abnormal Lab Results:  Labs Reviewed   CBC W/ AUTO DIFFERENTIAL - Abnormal; Notable for the following:        Result Value    RBC 3.75 (*)     Hemoglobin 8.3 (*)     Hematocrit 25.7 (*)     MCV 69 (*)     MCH 22.1 (*)     RDW 20.7 (*)     All other components within normal limits   COMPREHENSIVE METABOLIC PANEL - Abnormal; Notable for the following:     Chloride 112 (*)     CO2 12 (*)     BUN, Bld 78 (*)     Creatinine 6.5 (*)     Total Protein 9.3 (*)     Albumin 3.4 (*)     AST 68 (*)     Anion Gap 20 (*)     eGFR if  6 (*)     eGFR if non  5 (*)     All other components within normal limits   LIPASE - Abnormal; Notable for the following:     Lipase 306 (*)     All other components within normal limits   APTT - Abnormal; Notable for the following:     aPTT 34.5 (*)     All other components within normal limits   PROTIME-INR    "     All Lab Results:  Results for orders placed or performed during the hospital encounter of 06/23/18   CBC W/ AUTO DIFFERENTIAL   Result Value Ref Range    WBC 6.24 3.90 - 12.70 K/uL    RBC 3.75 (L) 4.00 - 5.40 M/uL    Hemoglobin 8.3 (L) 12.0 - 16.0 g/dL    Hematocrit 25.7 (L) 37.0 - 48.5 %    MCV 69 (L) 82 - 98 fL    MCH 22.1 (L) 27.0 - 31.0 pg    MCHC 32.3 32.0 - 36.0 g/dL    RDW 20.7 (H) 11.5 - 14.5 %    Platelets 221 150 - 350 K/uL    MPV SEE COMMENT 9.2 - 12.9 fL    Gran # (ANC) 4.2 1.8 - 7.7 K/uL    Lymph # 1.2 1.0 - 4.8 K/uL    Mono # 0.8 0.3 - 1.0 K/uL    Eos # 0.1 0.0 - 0.5 K/uL    Baso # 0.01 0.00 - 0.20 K/uL    Gran% 66.8 38.0 - 73.0 %    Lymph% 19.1 18.0 - 48.0 %    Mono% 12.5 4.0 - 15.0 %    Eosinophil% 1.6 0.0 - 8.0 %    Basophil% 0.2 0.0 - 1.9 %    Platelet Estimate Appears normal     Aniso Moderate     Poik Moderate     Hypo Occasional     Ovalocytes Occasional     Target Cells Moderate     Spherocytes Occasional     Schistocytes Present     Differential Method Automated    Comp. Metabolic Panel   Result Value Ref Range    Sodium 144 136 - 145 mmol/L    Potassium 4.6 3.5 - 5.1 mmol/L    Chloride 112 (H) 95 - 110 mmol/L    CO2 12 (L) 23 - 29 mmol/L    Glucose 72 70 - 110 mg/dL    BUN, Bld 78 (H) 8 - 23 mg/dL    Creatinine 6.5 (H) 0.5 - 1.4 mg/dL    Calcium 9.7 8.7 - 10.5 mg/dL    Total Protein 9.3 (H) 6.0 - 8.4 g/dL    Albumin 3.4 (L) 3.5 - 5.2 g/dL    Total Bilirubin 0.4 0.1 - 1.0 mg/dL    Alkaline Phosphatase 86 55 - 135 U/L    AST 68 (H) 10 - 40 U/L    ALT 37 10 - 44 U/L    Anion Gap 20 (H) 8 - 16 mmol/L    eGFR if African American 6 (A) >60 mL/min/1.73 m^2    eGFR if non African American 5 (A) >60 mL/min/1.73 m^2   Lipase   Result Value Ref Range    Lipase 306 (H) 4 - 60 U/L   Protime-INR   Result Value Ref Range    Prothrombin Time 10.5 9.0 - 12.5 sec    INR 1.0 0.8 - 1.2   APTT   Result Value Ref Range    aPTT 34.5 (H) 21.0 - 32.0 sec       Imaging Results:  Imaging Results          CT Abdomen  Pelvis  Without Contrast (Final result)  Result time 06/23/18 16:37:48    Final result by Austin Singer III, MD (06/23/18 16:37:48)                 Narrative:    EXAMINATION:  CT ABDOMEN PELVIS WITHOUT CONTRAST    CLINICAL HISTORY:  nausea, vomiting;    TECHNIQUE:  Routine CT abdomen and pelvis performed without IV contrast.  Coronal and sagittal reformatted images obtained.    COMPARISON:  Prior abdominal CT September 4, 2014    FINDINGS:  No acute disease identified in the lung bases.  No acute osseous abnormality or suspicious bone lesion identified.    Bilateral renal cystic lesions are again noted.  There is a complex heterogeneous hypodense lesion in the superior pole of the left kidney measuring up to 2.7 cm which is poorly evaluated without IV contrast.  No evidence of urolithiasis or hydronephrosis.  The bladder is within normal limits.    The gallbladder is surgically absent.  No significant bile duct dilation is identified.  Stable multiple tiny calcified granulomas or chronic auto infarction of the spleen.  The unenhanced liver, pancreas, and adrenals are unremarkable.    There is a small amount of retained ingested food particles within a minimally distended distal esophagus.  No obstructing mass identified.  The stomach is within normal limits.  There are multiple colonic diverticuli without evidence of acute diverticulitis.  There is mild diffuse wall thickening versus incomplete distention involving a long segment of the ascending and transverse colon without surrounding mesenteric inflammatory change identified.  There is no evidence of significant bowel obstruction.  There is no evidence of appendicitis.  No free intraperitoneal air, free fluid or abscess identified.  Extensive atherosclerosis is noted.  The aorta is normal in caliber.  No pathologically enlarged lymph nodes identified.    IMPRESSION  Small amount of retained food particles within a minimally distended distal esophagus. No  obstructing mass identified.    Mild wall thickening of the ascending and transverse colon could be due to incomplete distention or low-grade colitis.    Heterogeneous 2.7 cm mass in the superior pole of the left kidney which is poorly evaluated without IV contrast, possibly complex cyst or solid mass.    All CT scans at this facility use dose modulation, iterative reconstruction and/or weight based dosing when appropriate to reduce radiation dose to as low as reasonably achievable.      Electronically signed by: Austin Singer MD  Date:    06/23/2018  Time:    16:37                             X-Ray Chest AP Portable (Final result)  Result time 06/23/18 14:38:03    Final result by Chacorta Novak MD (06/23/18 14:38:03)                 Impression:      See above.      Electronically signed by: Chacorta Novak MD  Date:    06/23/2018  Time:    14:38             Narrative:    EXAMINATION:  XR ABDOMEN FLAT AND ERECT; XR CHEST AP PORTABLE    CLINICAL HISTORY:  Abdominal Pain; Vomiting, unspecified    TECHNIQUE:  Single frontal view of the chest and abdomen was performed.    COMPARISON:  06/21/2018    FINDINGS:  Mild cardiomegaly, stable.  Aorta demonstrates atherosclerotic disease.  No definite consolidation.  Trace left pleural effusion is suspected.  No pneumothorax.    Cholecystectomy clips in the right upper quadrant.  Mild constipation.  Overall nonobstructive bowel gas pattern.  Advanced degenerative changes are seen throughout the bones greatest within the lower lumbar spine and bilateral hip joints.  Moderate degenerative changes are seen at the level of the pubic symphysis.  Irregularity seen within the superior pubic ramus on the right.  If there is point tenderness in the region dedicated views are recommended.                               X-Ray Abdomen Flat And Erect (Final result)  Result time 06/23/18 14:38:03    Final result by Chacorta Novak MD (06/23/18 14:38:03)                 Impression:      See  above.      Electronically signed by: Chacorta Novak MD  Date:    06/23/2018  Time:    14:38             Narrative:    EXAMINATION:  XR ABDOMEN FLAT AND ERECT; XR CHEST AP PORTABLE    CLINICAL HISTORY:  Abdominal Pain; Vomiting, unspecified    TECHNIQUE:  Single frontal view of the chest and abdomen was performed.    COMPARISON:  06/21/2018    FINDINGS:  Mild cardiomegaly, stable.  Aorta demonstrates atherosclerotic disease.  No definite consolidation.  Trace left pleural effusion is suspected.  No pneumothorax.    Cholecystectomy clips in the right upper quadrant.  Mild constipation.  Overall nonobstructive bowel gas pattern.  Advanced degenerative changes are seen throughout the bones greatest within the lower lumbar spine and bilateral hip joints.  Moderate degenerative changes are seen at the level of the pubic symphysis.  Irregularity seen within the superior pubic ramus on the right.  If there is point tenderness in the region dedicated views are recommended.                                      The Emergency Provider reviewed the vital signs and test results, which are outlined above.    ED Discussion     3:45 PM: Re-evaluated pt. Pt states she is feeling better. No further episodes of vomiting in ER.     5:10 PM: Reassessed pt at this time. Pt tolerated PO challenge. Pt states her condition has improved at this time. Discussed with pt all pertinent ED information and results. Discussed to pt dx and plan of tx. Gave pt all f/u and return to the ED instructions. All questions and concerns were addressed at this time. Pt expresses understanding of information and instructions, and is comfortable with plan to discharge. Pt is stable for discharge.            ED Medication(s):  Medications   pantoprazole 40 mg in dextrose 5 % 100 mL infusion (ready to mix system) (0 mg Intravenous Stopped 6/23/18 6787)   ondansetron injection 4 mg (4 mg Intravenous Given 6/23/18 1411)       Discharge Medication List as of  6/23/2018  5:04 PM      START taking these medications    Details   ondansetron (ZOFRAN) 4 MG tablet Take 1 tablet (4 mg total) by mouth every 8 (eight) hours as needed., Starting Sat 6/23/2018, Normal             Follow-up Information     Derrick Desouza MD. Schedule an appointment as soon as possible for a visit in 2 days.    Specialty:  Family Medicine  Why:  Return to the Emergency room, If symptoms worsen  Contact information:  [address] [address]  [phone]                     Medical Decision Making    Medical Decision Making:   Clinical Tests:   Lab Tests: Ordered and Reviewed  Radiological Study: Ordered and Reviewed           Scribe Attestation:   Scribe #1: I performed the above scribed service and the documentation accurately describes the services I performed. I attest to the accuracy of the note.    Attending:   Physician Attestation Statement for Scribe #1: I, Joana Sullivan MD, personally performed the services described in this documentation, as scribed by Elizabeth Dave, in my presence, and it is both accurate and complete.          Clinical Impression       ICD-10-CM ICD-9-CM   1. Vomiting R11.10 787.03   2. Gastritis, presence of bleeding unspecified, unspecified chronicity, unspecified gastritis type K29.70 535.50   3. CKD (chronic kidney disease) stage 4, GFR 15-29 ml/min N18.4 585.4       Disposition:   Disposition: Discharged  Condition: Stable         Joana Sullivan MD  06/23/18 1163

## 2018-06-25 ENCOUNTER — TELEPHONE (OUTPATIENT)
Dept: NEPHROLOGY | Facility: CLINIC | Age: 83
End: 2018-06-25

## 2018-06-25 NOTE — TELEPHONE ENCOUNTER
----- Message from Daisy Pop sent at 6/25/2018  7:50 AM CDT -----  Contact: Angei lana from [facility]   States she's calling rg pt calling this weekend on hospice and has refused in the past and is now ready and needs to get 's notes and an order and can be reached to discuss [phone]//thanks/dbw

## 2018-06-25 NOTE — TELEPHONE ENCOUNTER
Called and left message for Angie. Informing her that the orders will be faxed to the number she provided.

## 2018-06-25 NOTE — TELEPHONE ENCOUNTER
----- Message from Sandy Anderson sent at 6/25/2018  1:00 PM CDT -----  Contact: [facility]  Please fax the pt's order and clinical info to [phone], can be reached at [phone]///[email or web address]

## 2021-03-30 NOTE — NURSING
Patient discharged from observation after treatment for ARF. Reviewed discharge instructions and medicatons. Expressed the need for follow up appointments per physicians recommendations. Patient was given the opportunity for questions and all were answered to their satisfaction. Patient discharged in no acute distress.   Telemetry monitor removed and returned to monitor room.   No IV access.unable to make appt with nephro, advised pt to call.    CHAVO/JOYCE/Kiersten
